# Patient Record
Sex: MALE | Race: WHITE | NOT HISPANIC OR LATINO | Employment: OTHER | URBAN - METROPOLITAN AREA
[De-identification: names, ages, dates, MRNs, and addresses within clinical notes are randomized per-mention and may not be internally consistent; named-entity substitution may affect disease eponyms.]

---

## 2017-01-04 ENCOUNTER — ALLSCRIPTS OFFICE VISIT (OUTPATIENT)
Dept: OTHER | Facility: OTHER | Age: 62
End: 2017-01-04

## 2017-01-10 ENCOUNTER — ALLSCRIPTS OFFICE VISIT (OUTPATIENT)
Dept: OTHER | Facility: OTHER | Age: 62
End: 2017-01-10

## 2017-01-18 ENCOUNTER — GENERIC CONVERSION - ENCOUNTER (OUTPATIENT)
Dept: OTHER | Facility: OTHER | Age: 62
End: 2017-01-18

## 2017-03-09 ENCOUNTER — ANESTHESIA EVENT (OUTPATIENT)
Dept: PERIOP | Facility: HOSPITAL | Age: 62
End: 2017-03-09
Payer: MEDICARE

## 2017-03-10 ENCOUNTER — GENERIC CONVERSION - ENCOUNTER (OUTPATIENT)
Dept: OTHER | Facility: OTHER | Age: 62
End: 2017-03-10

## 2017-03-10 ENCOUNTER — ANESTHESIA (OUTPATIENT)
Dept: PERIOP | Facility: HOSPITAL | Age: 62
End: 2017-03-10
Payer: MEDICARE

## 2017-03-10 ENCOUNTER — HOSPITAL ENCOUNTER (OUTPATIENT)
Facility: HOSPITAL | Age: 62
Setting detail: OUTPATIENT SURGERY
Discharge: HOME | End: 2017-03-10
Attending: INTERNAL MEDICINE | Admitting: INTERNAL MEDICINE
Payer: MEDICARE

## 2017-03-10 VITALS
RESPIRATION RATE: 18 BRPM | BODY MASS INDEX: 28.95 KG/M2 | SYSTOLIC BLOOD PRESSURE: 122 MMHG | DIASTOLIC BLOOD PRESSURE: 67 MMHG | HEIGHT: 68 IN | HEART RATE: 67 BPM | WEIGHT: 191 LBS | OXYGEN SATURATION: 98 % | TEMPERATURE: 98.6 F

## 2017-03-10 RX ORDER — PROPOFOL 10 MG/ML
INJECTION, EMULSION INTRAVENOUS AS NEEDED
Status: DISCONTINUED | OUTPATIENT
Start: 2017-03-10 | End: 2017-03-10 | Stop reason: SURG

## 2017-03-10 RX ORDER — SODIUM CHLORIDE, SODIUM LACTATE, POTASSIUM CHLORIDE, CALCIUM CHLORIDE 600; 310; 30; 20 MG/100ML; MG/100ML; MG/100ML; MG/100ML
125 INJECTION, SOLUTION INTRAVENOUS CONTINUOUS
Status: DISCONTINUED | OUTPATIENT
Start: 2017-03-10 | End: 2017-03-10 | Stop reason: HOSPADM

## 2017-03-10 RX ORDER — ONDANSETRON 2 MG/ML
4 INJECTION INTRAMUSCULAR; INTRAVENOUS ONCE AS NEEDED
Status: DISCONTINUED | OUTPATIENT
Start: 2017-03-10 | End: 2017-03-10 | Stop reason: HOSPADM

## 2017-03-10 RX ADMIN — PROPOFOL 50 MG: 10 INJECTION, EMULSION INTRAVENOUS at 09:16

## 2017-03-10 RX ADMIN — PROPOFOL 50 MG: 10 INJECTION, EMULSION INTRAVENOUS at 09:19

## 2017-03-10 RX ADMIN — PROPOFOL 50 MG: 10 INJECTION, EMULSION INTRAVENOUS at 09:10

## 2017-03-10 RX ADMIN — PROPOFOL 50 MG: 10 INJECTION, EMULSION INTRAVENOUS at 09:08

## 2017-03-10 RX ADMIN — PROPOFOL 50 MG: 10 INJECTION, EMULSION INTRAVENOUS at 09:12

## 2017-03-10 RX ADMIN — SODIUM CHLORIDE, SODIUM LACTATE, POTASSIUM CHLORIDE, AND CALCIUM CHLORIDE 125 ML/HR: .6; .31; .03; .02 INJECTION, SOLUTION INTRAVENOUS at 08:17

## 2017-04-12 ENCOUNTER — ALLSCRIPTS OFFICE VISIT (OUTPATIENT)
Dept: OTHER | Facility: OTHER | Age: 62
End: 2017-04-12

## 2017-04-12 DIAGNOSIS — Z11.59 ENCOUNTER FOR SCREENING FOR OTHER VIRAL DISEASES: ICD-10-CM

## 2017-04-12 DIAGNOSIS — E78.5 HYPERLIPIDEMIA: ICD-10-CM

## 2017-04-12 DIAGNOSIS — Z86.79 PERSONAL HISTORY OF OTHER DISEASES OF THE CIRCULATORY SYSTEM: ICD-10-CM

## 2017-04-12 DIAGNOSIS — Z86.39 PERSONAL HISTORY OF OTHER ENDOCRINE, NUTRITIONAL AND METABOLIC DISEASE: ICD-10-CM

## 2017-04-12 DIAGNOSIS — Z12.5 ENCOUNTER FOR SCREENING FOR MALIGNANT NEOPLASM OF PROSTATE: ICD-10-CM

## 2017-04-12 DIAGNOSIS — D50.9 IRON DEFICIENCY ANEMIA: ICD-10-CM

## 2017-05-25 ENCOUNTER — GENERIC CONVERSION - ENCOUNTER (OUTPATIENT)
Dept: OTHER | Facility: OTHER | Age: 62
End: 2017-05-25

## 2017-08-14 ENCOUNTER — OFFICE VISIT (OUTPATIENT)
Dept: URGENT CARE | Facility: CLINIC | Age: 62
End: 2017-08-14
Payer: MEDICARE

## 2017-08-14 PROCEDURE — G0463 HOSPITAL OUTPT CLINIC VISIT: HCPCS

## 2017-08-14 PROCEDURE — 99213 OFFICE O/P EST LOW 20 MIN: CPT

## 2017-09-28 ENCOUNTER — GENERIC CONVERSION - ENCOUNTER (OUTPATIENT)
Dept: OTHER | Facility: OTHER | Age: 62
End: 2017-09-28

## 2017-12-08 ENCOUNTER — ALLSCRIPTS OFFICE VISIT (OUTPATIENT)
Dept: OTHER | Facility: OTHER | Age: 62
End: 2017-12-08

## 2017-12-09 NOTE — PROGRESS NOTES
Assessment    1  Acute otitis externa of right ear, unspecified type (380 10) (H60 501)   2  Tinnitus of right ear (388 30) (H93 11)   3  Bilateral impacted cerumen (380 4) (H61 23)    Plan  Acute otitis externa of right ear, unspecified type, Bilateral impacted cerumen    · Neomycin-Polymyxin-HC 3 [id number] Otic Suspension; INSTILL 3 DROPS INAFFECTED EAR(S) 3-4 TIMES DAILY    Discussion/Summary  Possible side effects of new medications were reviewed with the patient/guardian today  The treatment plan was reviewed with the patient/guardian  The patient/guardian understands and agrees with the treatment plan      Chief Complaint  Patient is being seen today for an acute visit  Patient states his right ear is ringing/fluid like feeling x5 days  History of Present Illness  HPI: , with known hearing loss AD, presents with his wife for five days right ear ringing and increase difficulty hearing  No recent trauma, change in meds, or URI's  No dizziness  Review of Systems   Constitutional: no fever,-- not feeling poorly,-- no chills-- and-- not feeling tired  ENT: as noted in HPI  Cardiovascular: no chest pain  Respiratory: no shortness of breath  Gastrointestinal: no abdominal pain  Genitourinary: no complaints of dysuria or incontinence, no hesitancy, no nocturia, no genital lesion, no inadequacy of penile erection  Neurological: no headache,-- no confusion-- and-- no dizziness  Active Problems  1  Benign essential hypertension (401 1) (I10)   2  Bruxism (teeth grinding) (306 8) (F45 8)   3  Degenerative disk disease (722 6)   4  Generalized anxiety disorder (300 02) (F41 1)   5  Generalized osteoarthritis (715 00) (M15 9)   6  Hyperlipidemia (272 4) (E78 5)   7  Insect bite, initial encounter (919 4,E906 4) (W57 XXXA)   8  Iron deficiency anemia (280 9) (D50 9)   9  Lumbar radiculopathy (724 4) (M54 16)   10  Macular edema (362 83) (H35 81)   11  Malignant tumor of kidney (189 0) (C64 9)   12  Metastatic cancer to bone (198 5) (C79 51)   13  Need for hepatitis C screening test (V73 89) (Z11 59)   14  Need for influenza vaccination (V04 81) (Z23)   15  Paroxysmal atrial fibrillation (427 31) (I48 0)   16  Special screening examination for neoplasm of prostate (V76 44) (Z12 5)    Past Medical History  Active Problems And Past Medical History Reviewed: The active problems and past medical history were reviewed and updated today  Surgical History  Surgical History Reviewed: The surgical history was reviewed and updated today  Social History     · Denied: Alcohol Use (History)   · Caffeine Use   · Former smoker (V15 82) (T73 683)   · Marital History - Currently   The social history was reviewed and updated today  The social history was reviewed and is unchanged  Family History  Family History Reviewed: The family history was reviewed and updated today  Current Meds   1  B Complex-C Oral Tablet; TAKE 1 TABLET DAILY AS DIRECTED; Therapy: 44EUZ6006 to (Evaluate:49Rha5995) Recorded   2  Calcium + D 600-200 MG-UNIT TABS; TAKE AS DIRECTED PER PACKAGE INSTRUCTIONS; Therapy: 29GKQ1400 to Recorded   3  Sertraline HCl - 50 MG Oral Tablet; TAKE 1 TABLET DAILY; Therapy: 04MDT4286 to (Luzma Olivarez)  Requested for: 83ATC1474; Last Rx:[id number] Ordered    The medication list was reviewed and updated today  Allergies  1  No Known Drug Allergies    Vitals   Recorded: 73FYR4493 09:04AM   Temperature 97 6 F, Tympanic   Heart Rate 73   Respiration 16   Systolic 379, Sitting   Diastolic 68, Sitting   Height 5 ft 8 in   Weight 193 lb 8 0 oz   BMI Calculated 29 42   BSA Calculated 2 02   O2 Saturation 97       Physical Exam   Constitutional  General appearance: No acute distress, well appearing and well nourished  Eyes  Conjunctiva and lids: No swelling, erythema, or discharge  Pupils and irises: Equal, round and reactive to light     Ears, Nose, Mouth, and Throat  External inspection of ears and nose: Normal    Otoscopic examination: Abnormal  -- AU EAC blocked with cerumen  After large cerumen plugs removed, AS wnl, but right TM intact with erythema and whitish d/c in the proximal canal   Nasal mucosa, septum, and turbinates: Normal without edema or erythema  Neurologic  Cranial nerves: Cranial nerves 2-12 intact  Psychiatric  Orientation to person, place and time: Normal    Mood and affect: Normal          Procedure           Procedure: cerumen removal   Indication: tympanic membrane(s) could not be visualized and cerumen impaction in both ears  Procedure Note: The procedure was performed by the Provider--   The procedure required significant time and effort to remove the cerumen  A otoscope and speculum was placed in the ear canal(s) to visualize the ear canal debris  The ear was cleaned by using warm water irrigation-- and-- a wire loop  The procedure was successful  Post-Procedure:  Patient Status: the patient tolerated the procedure well  Complications: there were no complications  Follow-up as needed              Signatures   Electronically signed by : NATALY Reilly ; Dec  8 2017  9:37AM EST                       (Author)

## 2018-01-12 VITALS
HEART RATE: 70 BPM | SYSTOLIC BLOOD PRESSURE: 108 MMHG | DIASTOLIC BLOOD PRESSURE: 68 MMHG | RESPIRATION RATE: 16 BRPM | HEIGHT: 68 IN | TEMPERATURE: 97.6 F | BODY MASS INDEX: 28.95 KG/M2 | WEIGHT: 191 LBS

## 2018-01-13 VITALS
HEIGHT: 68 IN | DIASTOLIC BLOOD PRESSURE: 66 MMHG | HEART RATE: 76 BPM | WEIGHT: 194 LBS | RESPIRATION RATE: 16 BRPM | SYSTOLIC BLOOD PRESSURE: 120 MMHG | TEMPERATURE: 97.7 F | BODY MASS INDEX: 29.4 KG/M2

## 2018-01-15 VITALS
TEMPERATURE: 97.4 F | DIASTOLIC BLOOD PRESSURE: 74 MMHG | SYSTOLIC BLOOD PRESSURE: 130 MMHG | WEIGHT: 193 LBS | RESPIRATION RATE: 16 BRPM | HEART RATE: 68 BPM | BODY MASS INDEX: 29.25 KG/M2 | HEIGHT: 68 IN

## 2018-01-22 VITALS
HEIGHT: 68 IN | WEIGHT: 193.5 LBS | SYSTOLIC BLOOD PRESSURE: 112 MMHG | DIASTOLIC BLOOD PRESSURE: 68 MMHG | OXYGEN SATURATION: 97 % | BODY MASS INDEX: 29.33 KG/M2 | RESPIRATION RATE: 16 BRPM | HEART RATE: 73 BPM | TEMPERATURE: 97.6 F

## 2018-01-30 ENCOUNTER — TELEPHONE (OUTPATIENT)
Dept: INTERNAL MEDICINE CLINIC | Facility: CLINIC | Age: 63
End: 2018-01-30

## 2018-01-30 DIAGNOSIS — H60.90 OTITIS EXTERNA, UNSPECIFIED CHRONICITY, UNSPECIFIED LATERALITY, UNSPECIFIED TYPE: Primary | ICD-10-CM

## 2018-04-26 ENCOUNTER — TELEPHONE (OUTPATIENT)
Dept: INTERNAL MEDICINE CLINIC | Facility: CLINIC | Age: 63
End: 2018-04-26

## 2018-04-26 DIAGNOSIS — J30.9 ALLERGIC RHINITIS, UNSPECIFIED SEASONALITY, UNSPECIFIED TRIGGER: Primary | ICD-10-CM

## 2018-04-26 RX ORDER — MONTELUKAST SODIUM 10 MG/1
10 TABLET ORAL
Qty: 90 TABLET | Refills: 0 | Status: SHIPPED | OUTPATIENT
Start: 2018-04-26 | End: 2018-04-26 | Stop reason: SDUPTHER

## 2018-04-26 RX ORDER — MONTELUKAST SODIUM 10 MG/1
10 TABLET ORAL
Qty: 30 TABLET | Refills: 5 | Status: CANCELLED | OUTPATIENT
Start: 2018-04-26

## 2018-04-26 NOTE — TELEPHONE ENCOUNTER
Would like to know what you would recommend for allergies  He uses generic for Claritin, but is concerned because of only having one kidney  Could you please advise so we can let the pt know?  Thanks

## 2018-04-27 RX ORDER — MONTELUKAST SODIUM 10 MG/1
10 TABLET ORAL
Qty: 30 TABLET | Refills: 5 | Status: SHIPPED | OUTPATIENT
Start: 2018-04-27 | End: 2018-11-14

## 2018-06-25 PROBLEM — Z92.3 PERSONAL HISTORY OF RADIATION THERAPY: Status: ACTIVE | Noted: 2017-01-18

## 2018-06-25 PROBLEM — D50.9 IRON DEFICIENCY ANEMIA: Status: ACTIVE | Noted: 2017-04-12

## 2018-06-25 PROBLEM — C79.31 METASTASIS TO BRAIN (HCC): Status: ACTIVE | Noted: 2017-01-18

## 2018-06-25 PROBLEM — C78.00 METASTASIS TO LUNG (HCC): Status: ACTIVE | Noted: 2017-01-18

## 2018-06-25 PROBLEM — C64.2 MALIGNANT NEOPLASM OF LEFT KIDNEY, EXCEPT RENAL PELVIS (HCC): Status: ACTIVE | Noted: 2017-01-18

## 2018-06-25 PROBLEM — R91.8 PULMONARY NODULES: Status: ACTIVE | Noted: 2017-01-18

## 2018-06-25 PROBLEM — I10 BENIGN ESSENTIAL HYPERTENSION: Status: ACTIVE | Noted: 2017-04-12

## 2018-06-25 PROBLEM — C79.51 BONE METASTASIS (HCC): Status: ACTIVE | Noted: 2017-01-18

## 2018-06-27 ENCOUNTER — OFFICE VISIT (OUTPATIENT)
Dept: INTERNAL MEDICINE CLINIC | Facility: CLINIC | Age: 63
End: 2018-06-27
Payer: MEDICARE

## 2018-06-27 VITALS
HEART RATE: 71 BPM | TEMPERATURE: 98.3 F | SYSTOLIC BLOOD PRESSURE: 120 MMHG | WEIGHT: 189 LBS | OXYGEN SATURATION: 98 % | BODY MASS INDEX: 28.74 KG/M2 | DIASTOLIC BLOOD PRESSURE: 80 MMHG

## 2018-06-27 DIAGNOSIS — I48.0 PAROXYSMAL ATRIAL FIBRILLATION (HCC): ICD-10-CM

## 2018-06-27 DIAGNOSIS — E78.2 MIXED HYPERLIPIDEMIA: ICD-10-CM

## 2018-06-27 DIAGNOSIS — Z11.59 NEED FOR HEPATITIS C SCREENING TEST: ICD-10-CM

## 2018-06-27 DIAGNOSIS — M15.9 GENERALIZED OSTEOARTHRITIS: ICD-10-CM

## 2018-06-27 DIAGNOSIS — H35.81 MACULAR EDEMA: ICD-10-CM

## 2018-06-27 DIAGNOSIS — D50.9 IRON DEFICIENCY ANEMIA, UNSPECIFIED IRON DEFICIENCY ANEMIA TYPE: ICD-10-CM

## 2018-06-27 DIAGNOSIS — I10 BENIGN ESSENTIAL HYPERTENSION: Primary | ICD-10-CM

## 2018-06-27 DIAGNOSIS — Z12.5 SCREENING FOR PROSTATE CANCER: ICD-10-CM

## 2018-06-27 DIAGNOSIS — F41.1 GENERALIZED ANXIETY DISORDER: ICD-10-CM

## 2018-06-27 DIAGNOSIS — C64.2 MALIGNANT NEOPLASM OF LEFT KIDNEY, EXCEPT RENAL PELVIS (HCC): ICD-10-CM

## 2018-06-27 PROCEDURE — 99214 OFFICE O/P EST MOD 30 MIN: CPT | Performed by: INTERNAL MEDICINE

## 2018-06-27 NOTE — PATIENT INSTRUCTIONS

## 2018-06-27 NOTE — PROGRESS NOTES
Assessment/Plan:    No problem-specific Assessment & Plan notes found for this encounter  Diagnoses and all orders for this visit:    Benign essential hypertension  -     CBC and differential; Future  -     Comprehensive metabolic panel; Future  -     Microalbumin / creatinine urine ratio; Future    Paroxysmal atrial fibrillation (HCC)  -     TSH, 3rd generation; Future    Generalized osteoarthritis    Malignant neoplasm of left kidney, except renal pelvis (HCC)    Generalized anxiety disorder    Mixed hyperlipidemia  -     Lipid Panel with Direct LDL reflex; Future    Iron deficiency anemia, unspecified iron deficiency anemia type    Macular edema    Need for hepatitis C screening test  -     Hepatitis C antibody; Future    Screening for prostate cancer  -     PSA Total, Diagnostic; Future      A/P: Doing well and will check labs  Continue current treatment  Keep f/u with Onc and the eye specialist  RTC four months for routine  Subjective:      Patient ID: David Martinez is a 58 y o  male  WM RTC for f/u htn, MARINA, etc  Doing well and no c/o's  Remains active w/o difficulty and no falls  Just seen by [facility] and renal cell cancer with mets is doing well  Continues with the eye issues and seeing the specialist  Due for labs  The following portions of the patient's history were reviewed and updated as appropriate:   He  has a past medical history of Abnormal eye finding; Anemia; Atrial fibrillation ([facility] [address] ); Hyperlipidemia; and Hypertension    He   Patient Active Problem List    Diagnosis Date Noted    Benign essential hypertension 04/12/2017    Iron deficiency anemia 04/12/2017    Bone metastasis ([facility] [address] ) 01/18/2017    Malignant neoplasm of left kidney, except renal pelvis ([facility] [address] ) 01/18/2017    Metastasis to brain ([facility] [address] ) 01/18/2017    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 ([facility] [address] ) 01/18/2017    Personal history of radiation therapy 01/18/2017    Pulmonary nodules 01/18/2017    Macular edema 08/24/2016    Hyperlipidemia 08/18/2015    Paroxysmal atrial fibrillation ([facility] ) 11/21/2013    Degeneration of intervertebral disc 03/07/2013    Generalized osteoarthritis 11/20/2012    Metastatic cancer to bone ([facility] ) 11/20/2012    Generalized anxiety disorder 05/30/2012     He  has a past surgical history that includes External ear surgery; Kidney surgery; Tibia fracture surgery; and Colonoscopy (N/A, 3/10/2017)  His family history includes Cancer in his paternal uncle; No Known Problems in his mother  He  reports that he has quit smoking  He has never used smokeless tobacco  He reports that he does not drink alcohol  His drug history is not on file  Current Outpatient Prescriptions   Medication Sig Dispense Refill    b complex vitamins tablet Take 1 tablet by mouth daily      Bevacizumab (AVASTIN IV) Administer to both eyes q6 weeks       montelukast (SINGULAIR) 10 mg tablet Take 1 tablet (10 mg total) by mouth daily at bedtime 30 tablet 5    sertraline (ZOLOFT) 50 mg tablet Take 50 mg by mouth daily       No current facility-administered medications for this visit  Current Outpatient Prescriptions on File Prior to Visit   Medication Sig    b complex vitamins tablet Take 1 tablet by mouth daily    Bevacizumab (AVASTIN IV) Administer to both eyes q6 weeks     montelukast (SINGULAIR) 10 mg tablet Take 1 tablet (10 mg total) by mouth daily at bedtime    sertraline (ZOLOFT) 50 mg tablet Take 50 mg by mouth daily    [DISCONTINUED] neomycin-colistin-hydrocortisone-thonzonium (CORTISPORIN TC) 0 33-0 3-1-0 05 % otic suspension Administer 3 drops into both ears 4 (four) times a day for 3 days     No current facility-administered medications on file prior to visit  He has No Known Allergies       Review of Systems   Constitutional: Negative for activity change, chills, diaphoresis, fatigue and fever  HENT: Negative  Eyes: Positive for visual disturbance     Respiratory: Negative for cough, chest tightness, shortness of breath and wheezing  Cardiovascular: Negative for chest pain, palpitations and leg swelling  Gastrointestinal: Negative for abdominal pain, constipation, diarrhea, nausea and vomiting  Endocrine: Negative for cold intolerance and heat intolerance  Genitourinary: Negative for difficulty urinating, dysuria and frequency  Musculoskeletal: Negative for arthralgias, gait problem and myalgias  Neurological: Negative for dizziness, seizures, weakness, light-headedness and headaches  Psychiatric/Behavioral: Negative for confusion and dysphoric mood  The patient is not nervous/anxious  Objective:      /80   Pulse 71   Temp 98 3 °F (36 8 °C)   Wt 85 7 kg (189 lb)   SpO2 98%   BMI 28 74 kg/m²          Physical Exam   Constitutional: He is oriented to person, place, and time  He appears well-developed and well-nourished  No distress  HENT:   Head: Normocephalic and atraumatic  Mouth/Throat: Oropharynx is clear and moist    Eyes: Conjunctivae and EOM are normal  Pupils are equal, round, and reactive to light  Neck: Neck supple  No JVD present  Cardiovascular: Normal rate, regular rhythm and normal heart sounds  No murmur heard  Pulmonary/Chest: Effort normal and breath sounds normal  No respiratory distress  He has no wheezes  Abdominal: Soft  Bowel sounds are normal  He exhibits no distension  There is no tenderness  Musculoskeletal: He exhibits no edema  Neurological: He is alert and oriented to person, place, and time  Psychiatric: He has a normal mood and affect  His behavior is normal  Judgment and thought content normal    Nursing note and vitals reviewed

## 2018-08-08 ENCOUNTER — OFFICE VISIT (OUTPATIENT)
Dept: INTERNAL MEDICINE CLINIC | Facility: CLINIC | Age: 63
End: 2018-08-08
Payer: MEDICARE

## 2018-08-08 VITALS
WEIGHT: 188 LBS | RESPIRATION RATE: 16 BRPM | SYSTOLIC BLOOD PRESSURE: 100 MMHG | BODY MASS INDEX: 28.49 KG/M2 | TEMPERATURE: 97.8 F | DIASTOLIC BLOOD PRESSURE: 60 MMHG | HEART RATE: 74 BPM | HEIGHT: 68 IN

## 2018-08-08 DIAGNOSIS — H93.11 TINNITUS OF RIGHT EAR: ICD-10-CM

## 2018-08-08 DIAGNOSIS — H91.91 HEARING LOSS OF RIGHT EAR, UNSPECIFIED HEARING LOSS TYPE: ICD-10-CM

## 2018-08-08 DIAGNOSIS — H61.21 IMPACTED CERUMEN OF RIGHT EAR: Primary | ICD-10-CM

## 2018-08-08 PROCEDURE — 69209 REMOVE IMPACTED EAR WAX UNI: CPT | Performed by: INTERNAL MEDICINE

## 2018-08-08 PROCEDURE — 99213 OFFICE O/P EST LOW 20 MIN: CPT | Performed by: INTERNAL MEDICINE

## 2018-08-08 NOTE — PROGRESS NOTES
Assessment/Plan:    No problem-specific Assessment & Plan notes found for this encounter  Diagnoses and all orders for this visit:    Impacted cerumen of right ear  -     Ambulatory Referral to Otolaryngology; Future  -     carbamide peroxide (DEBROX) 6 5 % otic solution; Administer 5 drops into both ears 2 (two) times a day  -     Ear cerumen removal    Tinnitus of right ear  -     Ambulatory Referral to Otolaryngology; Future  -     carbamide peroxide (DEBROX) 6 5 % otic solution; Administer 5 drops into both ears 2 (two) times a day  -     Ear cerumen removal    Hearing loss of right ear, unspecified hearing loss type  -     Ambulatory Referral to Otolaryngology; Future  -     carbamide peroxide (DEBROX) 6 5 % otic solution; Administer 5 drops into both ears 2 (two) times a day  -     Ear cerumen removal    Other orders  -     Cancel: Ear cerumen removal      A/P: Will give debrox for both ears and refer to ENT  ??tinnitus or can he be having residual affect of prior sx and XRT, ie scar tissue  No focal findings on PE  May need a f/u imaging of the brain  RTC as scheduled  Subjective:      Patient ID: Maxine Merino is a 58 y o  male  WM, with chronic loss of hearing on the right after sx and XRT for brain mets, presents due to several weeks of ringing in the ears  No recent trauma  Uses hearing plugs when he cuts the grass  No balance issues  NO recent URI  The following portions of the patient's history were reviewed and updated as appropriate:   He  has a past medical history of Abnormal eye finding; Anemia; Atrial fibrillation ([address] ); Hyperlipidemia; and Hypertension    He   Patient Active Problem List    Diagnosis Date Noted    Benign essential hypertension 04/12/2017    Iron deficiency anemia 04/12/2017    Bone metastasis ([address] ) 01/18/2017    Malignant neoplasm of left kidney, except renal pelvis ([address] ) 01/18/2017    Metastasis to brain ([address] ) 01/18/2017    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 ([address] ) 01/18/2017    Personal history of radiation therapy 01/18/2017    Pulmonary nodules 01/18/2017    Macular edema 08/24/2016    Hyperlipidemia 08/18/2015    Paroxysmal atrial fibrillation ([facility] ) 11/21/2013    Degeneration of intervertebral disc 03/07/2013    Generalized osteoarthritis 11/20/2012    [facility] ) 11/20/2012    Generalized anxiety disorder 05/30/2012     He  has a past surgical history that includes External ear surgery; Kidney surgery; Tibia fracture surgery; and Colonoscopy (N/A, 3/10/2017)  His family history includes Cancer in his paternal uncle; No Known Problems in his mother  He  reports that he has quit smoking  He has never used smokeless tobacco  He reports that he does not drink alcohol  His drug history is not on file  Current Outpatient Prescriptions   Medication Sig Dispense Refill    Bevacizumab (AVASTIN IV) Administer to both eyes q6 weeks       montelukast (SINGULAIR) 10 mg tablet Take 1 tablet (10 mg total) by mouth daily at bedtime 30 tablet 5    sertraline (ZOLOFT) 50 mg tablet Take 50 mg by mouth daily      carbamide peroxide (DEBROX) 6 5 % otic solution Administer 5 drops into both ears 2 (two) times a day 15 mL 1     No current facility-administered medications for this visit  Current Outpatient Prescriptions on File Prior to Visit   Medication Sig    Bevacizumab (AVASTIN IV) Administer to both eyes q6 weeks     montelukast (SINGULAIR) 10 mg tablet Take 1 tablet (10 mg total) by mouth daily at bedtime    sertraline (ZOLOFT) 50 mg tablet Take 50 mg by mouth daily    [DISCONTINUED] b complex vitamins tablet Take 1 tablet by mouth daily     No current facility-administered medications on file prior to visit  He has No Known Allergies       Review of Systems   Constitutional: Negative for activity change, chills, diaphoresis, fatigue and fever  HENT: Positive for hearing loss and tinnitus   Negative for congestion, ear discharge, ear pain, facial swelling, sore throat, trouble swallowing and voice change  Respiratory: Negative for cough, chest tightness, shortness of breath and wheezing  Cardiovascular: Negative for chest pain, palpitations and leg swelling  Gastrointestinal: Negative for abdominal pain, constipation, diarrhea, nausea and vomiting  Genitourinary: Negative for difficulty urinating, dysuria and frequency  Musculoskeletal: Negative for arthralgias, gait problem and myalgias  Neurological: Negative for dizziness, seizures, facial asymmetry, weakness, light-headedness and headaches  Psychiatric/Behavioral: Negative for confusion  The patient is not nervous/anxious  Objective:      /60   Pulse 74   Temp 97 8 °F (36 6 °C) (Tympanic)   Resp 16   Ht 5' 8" (1 727 m)   Wt 85 3 kg (188 lb)   BMI 28 59 kg/m²          Physical Exam   Constitutional: He is oriented to person, place, and time  He appears well-developed and well-nourished  No distress  HENT:   Head: Normocephalic and atraumatic  Nose: Nose normal    Mouth/Throat: Oropharynx is clear and moist  No oropharyngeal exudate  AU EAC bilat with some cerumen, right greater than the left  AS TM visualized and wnl  AD TM only partially viewed due to cerumen  Eyes: Conjunctivae and EOM are normal  Pupils are equal, round, and reactive to light  Neck: Neck supple  Lymphadenopathy:     He has no cervical adenopathy  Neurological: He is alert and oriented to person, place, and time  No cranial nerve deficit  Coordination normal    Psychiatric: He has a normal mood and affect  His behavior is normal  Judgment and thought content normal    Nursing note and vitals reviewed        Ear cerumen removal  Date/Time: 8/8/2018 3:14 PM  Performed by: Marysol Valles by: Hakeem Kaur     Patient location:  Clinic  Consent:     Consent obtained:  Verbal    Consent given by:  Patient    Risks discussed:  Bleeding, dizziness, infection, incomplete removal, pain and TM perforation    Alternatives discussed:  Referral and no treatment  Universal protocol:     Patient identity confirmed:  Verbally with patient  Procedure details:     Location:  R ear    Procedure type: irrigation      Approach:  External  Post-procedure details:     Complication:  None    Hearing quality:  Diminished    Patient tolerance of procedure: Tolerated well, no immediate complications  Comments:      AD EAC now patent with moderate removal, but some residual cerumen on the floor  TM intact with some scarring  No change in hearing or ringing

## 2018-09-26 ENCOUNTER — APPOINTMENT (OUTPATIENT)
Dept: LAB | Facility: HOSPITAL | Age: 63
End: 2018-09-26
Attending: INTERNAL MEDICINE
Payer: MEDICARE

## 2018-09-26 ENCOUNTER — CLINICAL SUPPORT (OUTPATIENT)
Dept: INTERNAL MEDICINE CLINIC | Facility: CLINIC | Age: 63
End: 2018-09-26
Payer: MEDICARE

## 2018-09-26 ENCOUNTER — TRANSCRIBE ORDERS (OUTPATIENT)
Dept: ADMINISTRATIVE | Facility: HOSPITAL | Age: 63
End: 2018-09-26

## 2018-09-26 DIAGNOSIS — R91.8 PULMONARY NODULES: ICD-10-CM

## 2018-09-26 DIAGNOSIS — C79.49 SECONDARY MALIGNANT NEOPLASM OF BRAIN AND SPINAL CORD (HCC): ICD-10-CM

## 2018-09-26 DIAGNOSIS — Z92.3 PERSONAL HISTORY OF RADIATION THERAPY: ICD-10-CM

## 2018-09-26 DIAGNOSIS — I48.0 PAROXYSMAL ATRIAL FIBRILLATION (HCC): ICD-10-CM

## 2018-09-26 DIAGNOSIS — C79.51 BONE METASTASIS (HCC): ICD-10-CM

## 2018-09-26 DIAGNOSIS — Z12.5 SCREENING FOR PROSTATE CANCER: ICD-10-CM

## 2018-09-26 DIAGNOSIS — C64.2 MALIGNANT NEOPLASM OF LEFT KIDNEY, EXCEPT RENAL PELVIS (HCC): Primary | ICD-10-CM

## 2018-09-26 DIAGNOSIS — C79.31 SECONDARY MALIGNANT NEOPLASM OF BRAIN AND SPINAL CORD (HCC): ICD-10-CM

## 2018-09-26 DIAGNOSIS — C64.2 MALIGNANT NEOPLASM OF LEFT KIDNEY, EXCEPT RENAL PELVIS (HCC): ICD-10-CM

## 2018-09-26 DIAGNOSIS — Z11.59 NEED FOR HEPATITIS C SCREENING TEST: ICD-10-CM

## 2018-09-26 DIAGNOSIS — I10 BENIGN ESSENTIAL HYPERTENSION: ICD-10-CM

## 2018-09-26 DIAGNOSIS — E78.2 MIXED HYPERLIPIDEMIA: ICD-10-CM

## 2018-09-26 DIAGNOSIS — C78.00 MALIGNANT NEOPLASM METASTATIC TO LUNG, UNSPECIFIED LATERALITY (HCC): ICD-10-CM

## 2018-09-26 DIAGNOSIS — Z23 NEED FOR VACCINATION: Primary | ICD-10-CM

## 2018-09-26 LAB
ALBUMIN SERPL BCP-MCNC: 4.6 G/DL (ref 3.5–5.7)
ALP SERPL-CCNC: 89 U/L (ref 55–165)
ALT SERPL W P-5'-P-CCNC: 12 U/L (ref 7–52)
ANION GAP SERPL CALCULATED.3IONS-SCNC: 6 MMOL/L (ref 4–13)
AST SERPL W P-5'-P-CCNC: 16 U/L (ref 13–39)
BASOPHILS # BLD AUTO: 0 THOUSANDS/ΜL (ref 0–0.1)
BASOPHILS NFR BLD AUTO: 0 % (ref 0–2)
BILIRUB SERPL-MCNC: 0.8 MG/DL (ref 0.2–1)
BUN SERPL-MCNC: 20 MG/DL (ref 7–25)
CALCIUM ALBUM COR SERPL-MCNC: 9.7 MG/DL (ref 8.3–10.1)
CALCIUM SERPL-MCNC: 10.2 MG/DL (ref 8.6–10.5)
CHLORIDE SERPL-SCNC: 100 MMOL/L (ref 98–107)
CHOLEST SERPL-MCNC: 170 MG/DL (ref 0–200)
CO2 SERPL-SCNC: 32 MMOL/L (ref 21–31)
CREAT SERPL-MCNC: 1.15 MG/DL (ref 0.7–1.3)
CREAT UR-MCNC: 88.3 MG/DL
EOSINOPHIL # BLD AUTO: 0.1 THOUSAND/ΜL (ref 0–0.61)
EOSINOPHIL NFR BLD AUTO: 1 % (ref 0–5)
ERYTHROCYTE [DISTWIDTH] IN BLOOD BY AUTOMATED COUNT: 13.4 % (ref 11.5–14.5)
GFR SERPL CREATININE-BSD FRML MDRD: 68 ML/MIN/1.73SQ M
GLUCOSE P FAST SERPL-MCNC: 90 MG/DL (ref 65–99)
HCT VFR BLD AUTO: 39.9 % (ref 36.5–49.3)
HDLC SERPL-MCNC: 45 MG/DL (ref 40–60)
HGB BLD-MCNC: 13.3 G/DL (ref 14–18)
LDLC SERPL CALC-MCNC: 103 MG/DL (ref 75–193)
LYMPHOCYTES # BLD AUTO: 1 THOUSANDS/ΜL (ref 0.6–4.47)
LYMPHOCYTES NFR BLD AUTO: 19 % (ref 21–51)
MCH RBC QN AUTO: 30 PG (ref 26–34)
MCHC RBC AUTO-ENTMCNC: 33.3 G/DL (ref 31–37)
MCV RBC AUTO: 90 FL (ref 81–99)
MICROALBUMIN UR-MCNC: <5 MG/L (ref 0–20)
MICROALBUMIN/CREAT 24H UR: <6 MG/G CREATININE (ref 0–30)
MONOCYTES # BLD AUTO: 0.6 THOUSAND/ΜL (ref 0.17–1.22)
MONOCYTES NFR BLD AUTO: 11 % (ref 2–12)
NEUTROPHILS # BLD AUTO: 3.7 THOUSANDS/ΜL (ref 1.4–6.5)
NEUTS SEG NFR BLD AUTO: 69 % (ref 42–75)
NRBC BLD AUTO-RTO: 0 /100 WBCS
PLATELET # BLD AUTO: 189 THOUSANDS/UL (ref 149–390)
PMV BLD AUTO: 8 FL (ref 8.6–11.7)
POTASSIUM SERPL-SCNC: 4.8 MMOL/L (ref 3.5–5.5)
PROT SERPL-MCNC: 7.1 G/DL (ref 6.4–8.9)
PSA SERPL-MCNC: 0.9 NG/ML (ref 0–4)
RBC # BLD AUTO: 4.42 MILLION/UL (ref 4.3–5.9)
SODIUM SERPL-SCNC: 138 MMOL/L (ref 134–143)
TRIGL SERPL-MCNC: 111 MG/DL (ref 44–166)
TSH SERPL DL<=0.05 MIU/L-ACNC: 2.71 UIU/ML (ref 0.45–5.33)
WBC # BLD AUTO: 5.4 THOUSAND/UL (ref 4.8–10.8)

## 2018-09-26 PROCEDURE — 80061 LIPID PANEL: CPT

## 2018-09-26 PROCEDURE — G0103 PSA SCREENING: HCPCS

## 2018-09-26 PROCEDURE — 82570 ASSAY OF URINE CREATININE: CPT

## 2018-09-26 PROCEDURE — G0008 ADMIN INFLUENZA VIRUS VAC: HCPCS

## 2018-09-26 PROCEDURE — 85025 COMPLETE CBC W/AUTO DIFF WBC: CPT

## 2018-09-26 PROCEDURE — 80053 COMPREHEN METABOLIC PANEL: CPT

## 2018-09-26 PROCEDURE — 90682 RIV4 VACC RECOMBINANT DNA IM: CPT

## 2018-09-26 PROCEDURE — 82043 UR ALBUMIN QUANTITATIVE: CPT

## 2018-09-26 PROCEDURE — 36415 COLL VENOUS BLD VENIPUNCTURE: CPT

## 2018-09-26 PROCEDURE — 86803 HEPATITIS C AB TEST: CPT

## 2018-09-26 PROCEDURE — 84443 ASSAY THYROID STIM HORMONE: CPT

## 2018-09-27 LAB — HCV AB SER QL: NORMAL

## 2018-11-14 ENCOUNTER — OFFICE VISIT (OUTPATIENT)
Dept: INTERNAL MEDICINE CLINIC | Facility: CLINIC | Age: 63
End: 2018-11-14
Payer: MEDICARE

## 2018-11-14 VITALS
TEMPERATURE: 97.9 F | DIASTOLIC BLOOD PRESSURE: 68 MMHG | HEART RATE: 70 BPM | HEIGHT: 68 IN | BODY MASS INDEX: 28.34 KG/M2 | WEIGHT: 187 LBS | SYSTOLIC BLOOD PRESSURE: 120 MMHG | RESPIRATION RATE: 14 BRPM

## 2018-11-14 DIAGNOSIS — E78.2 MIXED HYPERLIPIDEMIA: ICD-10-CM

## 2018-11-14 DIAGNOSIS — Z00.00 MEDICARE ANNUAL WELLNESS VISIT, INITIAL: ICD-10-CM

## 2018-11-14 DIAGNOSIS — F41.1 GENERALIZED ANXIETY DISORDER: ICD-10-CM

## 2018-11-14 DIAGNOSIS — I48.0 PAROXYSMAL ATRIAL FIBRILLATION (HCC): ICD-10-CM

## 2018-11-14 DIAGNOSIS — F41.9 ANXIETY: Primary | ICD-10-CM

## 2018-11-14 DIAGNOSIS — D50.9 IRON DEFICIENCY ANEMIA, UNSPECIFIED IRON DEFICIENCY ANEMIA TYPE: ICD-10-CM

## 2018-11-14 DIAGNOSIS — C64.2 MALIGNANT NEOPLASM OF LEFT KIDNEY, EXCEPT RENAL PELVIS (HCC): ICD-10-CM

## 2018-11-14 DIAGNOSIS — I10 BENIGN ESSENTIAL HYPERTENSION: ICD-10-CM

## 2018-11-14 PROCEDURE — G0439 PPPS, SUBSEQ VISIT: HCPCS | Performed by: INTERNAL MEDICINE

## 2018-11-14 PROCEDURE — 99214 OFFICE O/P EST MOD 30 MIN: CPT | Performed by: INTERNAL MEDICINE

## 2018-11-14 NOTE — PROGRESS NOTES
Assessment and Plan:    Problem List Items Addressed This Visit        Cardiovascular and Mediastinum    Benign essential hypertension    Paroxysmal atrial fibrillation (HCC)       Genitourinary    Malignant neoplasm of left kidney, except renal pelvis (HCC)       Other    Generalized anxiety disorder    Relevant Medications    sertraline (ZOLOFT) 50 mg tablet    Hyperlipidemia    Iron deficiency anemia      Other Visit Diagnoses     Anxiety    -  Primary    Relevant Medications    sertraline (ZOLOFT) 50 mg tablet    Medicare annual wellness visit, initial            Health Maintenance Due   Topic Date Due    Pneumococcal PPSV23 Highest Risk Adult (1 of 3 - PCV13) 10[birth year]/1974         HPI:  Bonifacio Hussein is a 61 y o  male here for his Initial Wellness Visit      Patient Active Problem List   Diagnosis    Benign essential hypertension    Bone metastasis (HCC)    Degeneration of intervertebral disc    Generalized anxiety disorder    Generalized osteoarthritis    Hyperlipidemia    Iron deficiency anemia    Macular edema    Malignant neoplasm of left kidney, except renal pelvis (HCC)    Metastasis to brain (HCC)    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 (HCC)    Metastatic cancer to bone (HCC)    Paroxysmal atrial fibrillation (HCC)    Personal history of radiation therapy    Pulmonary nodules     Past Medical History:   Diagnosis Date    Abnormal eye finding     last assessed 08/18/2015    Anemia     Atrial fibrillation (HCC)     Generalized anxiety disorder     Hyperlipidemia     Hypertension      Past Surgical History:   Procedure Laterality Date    COLONOSCOPY N/A 3/10/2017    Procedure: COLONOSCOPY;  Surgeon: Deon Du MD;  Location: [facility];  [facility]      KIDNEY SURGERY      TIBIA FRACTURE SURGERY       Family History   Problem Relation Age of Onset    No Known Problems Mother     Cancer Paternal Uncle      History   Smoking Status    Former Smoker   Smokeless Tobacco    Never Used     History   Alcohol Use No      History   Drug use: Unknown       Current Outpatient Prescriptions   Medication Sig Dispense Refill    Bevacizumab (AVASTIN IV) Administer to both eyes q6 weeks       sertraline (ZOLOFT) 50 mg tablet Take 1 tablet (50 mg total) by mouth daily 90 tablet 3     No current facility-administered medications for this visit  No Known Allergies  Immunization History   Administered Date(s) Administered    Influenza 09/29/2014, 10/15/2015, 10/03/2016, 10/02/2017    Influenza Quadrivalent Preservative Free 3 years and older IM 10/15/2015, 10/03/2016    Influenza Quadrivalent, 6-35 Months IM 10/02/2017    Influenza TIV (IM) 10/01/2013, 09/29/2014    Influenza, recombinant, quadrivalent,injectable, preservative free 09/26/2018    Td (adult), adsorbed 01/01/2004    Tdap 08/18/2015       Patient Care Team:  Moraima Noble DO as PCP - General  MD Moraima Montez DO    Medicare Screening Tests and Risk Assessments:  Liz Escalera is here for his Initial Wellness visit  Last Medicare Wellness visit information reviewed, patient interviewed and updates made to the record today  Health Risk Assessment:  Patient rates overall health as good  Patient feels that their physical health rating is Slightly better  Eyesight was rated as Same  Hearing was rated as Same  Patient feels that their emotional and mental health rating is Same  Pain experienced by patient in the last 7 days has been None  Patient states that he has experienced no weight loss or gain in last 6 months  Emotional/Mental Health:  Patient has been feeling nervous/anxious  PHQ-9 Depression Screening:    Frequency of the following problems over the past two weeks:      1  Little interest or pleasure in doing things: 0 - not at all      2  Feeling down, depressed, or hopeless: 0 - not at all  PHQ-2 Score: 0          Broken Bones/Falls:     Fall Risk Assessment:    In the past year, patient has experienced: No history of falling in past year          Bladder/Bowel:  Patient has not leaked urine accidently in the last six months  Patient reports no loss of bowel control  Immunizations:  Patient has had a flu vaccination within the last year  Patient has received a pneumonia shot  Patient has not received a shingles shot  Patient has received tetanus/diphtheria shot  Home Safety:  Patient does not have trouble with stairs inside or outside of their home  Patient currently reports that there are no safety hazards present in home, working smoke alarms, working carbon monoxide detectors  Preventative Screenings:   prostate cancer screen performed, colon cancer screen completed, cholesterol screen completed, glaucoma eye exam completed,     Nutrition:  Current diet: Regular with servings of the following:    Medications:  Patient is not currently taking any over-the-counter supplements  Patient is able to manage medications  Lifestyle Choices:  Patient reports no tobacco use  Patient has smoked or used tobacco in the past   Patient has stopped his tobacco use  Patient reports no alcohol use  Patient drives a vehicle  Patient wears seat belt  Activities of Daily Living:  Can get out of bed by his or her self, able to dress self, able to make own meals, able to do own shopping, able to bathe self, can do own laundry/housekeeping, can manage own money, pay bills and track expenses    Previous Hospitalizations:  No hospitalization or ED visit in past 12 months        Advanced Directives:  Patient has not decided on power of   Patient has completed advanced directive          Preventative Screening/Counseling:      Cardiovascular:      General: Screening Current      Counseling: Healthy Diet, Healthy Weight, Improve Cholesterol, Improve Blood Pressure and Improve Exercise Tolerance          Diabetes:      General: Screening Current      Counseling: Healthy Diet, Healthy Weight and Improve Physical Activity          Colorectal Cancer:      General: Screening Current      Counseling: high fiber diet          Prostate Cancer:      General: Screening Current          Osteoporosis:      General: Screening Not Indicated          AAA:      General: Screening Not Indicated          Glaucoma:      General: Screening Current          HIV:      General: Screening Not Indicated          Hepatitis C:      General: Screening Current        Advanced Directives:   Patient has living will for healthcare, has durable POA for healthcare, patient has an advanced directive  Information on ACP and/or AD provided  No 5 wishes given  End of life assessment reviewed with patient  Provider agrees with end of life decisions   Additional Comments: Some confusion  Pt reports POA is his wife, but he is not certain that there is written documentation to that effect  Will check it out and let us know  Immunizations:      Influenza: Influenza UTD This Year      TDAP: Tdap Vaccine UTD  Additional Comments: Not 65 yet, but qualifies for pneumonia vaccine, but defers for now  Other Preventative Counseling (Non-Medicare): Fall Prevention, Increase physical activity, Nutrition Counseling and Car/seat belt/driving safety reviewed      A/P: Doing well and no falls  Denies depression and feels safe at home  Diverse diet  No problems operating a MV and wears a seat belt  Has a living will and POA  No DME or referrals needed today  RTC one year for medicare wellness

## 2018-11-14 NOTE — PROGRESS NOTES
Assessment/Plan:    No problem-specific Assessment & Plan notes found for this encounter  Diagnoses and all orders for this visit:    Anxiety  -     sertraline (ZOLOFT) 50 mg tablet; Take 1 tablet (50 mg total) by mouth daily    Benign essential hypertension    Paroxysmal atrial fibrillation (HCC)    Malignant neoplasm of left kidney, except renal pelvis (HCC)    Generalized anxiety disorder    Mixed hyperlipidemia    Iron deficiency anemia, unspecified iron deficiency anemia type    Medicare annual wellness visit, initial      A/P: Doing great  Labs good  Will continue current treatment  Keep f/u with onc and nephro  Recommend OTC probiotics and anti gas products for the bloating  RTC four months for routine  Subjective:      Patient ID: Maxine Wolf is a 61 y o  male  WM RTC for f/u htn, hypelipidemia, etc  Doing well and only new c/o is some bloating and increase gas after eating a lot broccoli, brussels sprouts, etc  Remains very active and no falls  Renal cancer and all it's mets are in remission  EMBER is good  Recent labs acceptable  Had his flu shot  The following portions of the patient's history were reviewed and updated as appropriate:   He  has a past medical history of Abnormal eye finding; Anemia; Atrial fibrillation ([address] ); Generalized anxiety disorder; Hyperlipidemia; and Hypertension    He   Patient Active Problem List    Diagnosis Date Noted    Benign essential hypertension 04/12/2017    Iron deficiency anemia 04/12/2017    Bone metastasis ([address] ) 01/18/2017    Malignant neoplasm of left kidney, except renal pelvis ([address] ) 01/18/2017    Metastasis to brain ([address] ) 01/18/2017    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 ([facility] [address] ) 01/18/2017    Personal history of radiation therapy 01/18/2017    Pulmonary nodules 01/18/2017    Macular edema 08/24/2016    Hyperlipidemia 08/18/2015    Paroxysmal atrial fibrillation ([address] ) 11/21/2013    Degeneration of intervertebral disc 03/07/2013    Generalized osteoarthritis 11/20/2012    Metastatic cancer to bone ([facility] [address] ) 11/20/2012    Generalized anxiety disorder 05/30/2012     He  has a past surgical history that includes External ear surgery; Kidney surgery; Tibia fracture surgery; and Colonoscopy (N/A, 3/10/2017)  His family history includes Cancer in his paternal uncle; No Known Problems in his mother  He  reports that he has quit smoking  He has never used smokeless tobacco  He reports that he does not drink alcohol  His drug history is not on file  Current Outpatient Prescriptions   Medication Sig Dispense Refill    Bevacizumab (AVASTIN IV) Administer to both eyes q6 weeks       sertraline (ZOLOFT) 50 mg tablet Take 1 tablet (50 mg total) by mouth daily 90 tablet 3     No current facility-administered medications for this visit  Current Outpatient Prescriptions on File Prior to Visit   Medication Sig    Bevacizumab (AVASTIN IV) Administer to both eyes q6 weeks     [DISCONTINUED] carbamide peroxide (DEBROX) 6 5 % otic solution Administer 5 drops into both ears 2 (two) times a day (Patient not taking: Reported on 11/14/2018 )    [DISCONTINUED] montelukast (SINGULAIR) 10 mg tablet Take 1 tablet (10 mg total) by mouth daily at bedtime (Patient not taking: Reported on 11/14/2018 )    [DISCONTINUED] sertraline (ZOLOFT) 50 mg tablet Take 50 mg by mouth daily     No current facility-administered medications on file prior to visit  He has No Known Allergies       Review of Systems   Constitutional: Negative for activity change, chills, diaphoresis, fatigue and fever  HENT: Negative  Eyes: Negative for visual disturbance  Respiratory: Negative for cough, chest tightness, shortness of breath and wheezing  Cardiovascular: Negative for chest pain, palpitations and leg swelling  Gastrointestinal: Negative for abdominal pain, constipation, diarrhea, nausea and vomiting  Endocrine: Negative for cold intolerance and heat intolerance     Genitourinary: Negative for difficulty urinating, dysuria and frequency  Musculoskeletal: Negative for arthralgias, gait problem and myalgias  Neurological: Negative for dizziness, seizures, syncope, weakness, light-headedness and headaches  Psychiatric/Behavioral: Negative for confusion, dysphoric mood, self-injury, sleep disturbance and suicidal ideas  The patient is not nervous/anxious  Objective:      /68   Pulse 70   Temp 97 9 °F (36 6 °C) (Tympanic)   Resp 14   Ht 5' 8" (1 727 m)   Wt 84 8 kg (187 lb)   BMI 28 43 kg/m²          Physical Exam   Constitutional: He is oriented to person, place, and time  He appears well-developed and well-nourished  No distress  HENT:   Head: Normocephalic and atraumatic  Mouth/Throat: Oropharynx is clear and moist  No oropharyngeal exudate  Eyes: Pupils are equal, round, and reactive to light  Conjunctivae and EOM are normal    Neck: Neck supple  No JVD present  Cardiovascular: Normal rate, regular rhythm and normal heart sounds  No murmur heard  Pulmonary/Chest: Effort normal and breath sounds normal  No respiratory distress  He has no wheezes  Abdominal: Soft  Bowel sounds are normal  He exhibits no distension  There is no tenderness  Musculoskeletal: He exhibits no edema  Neurological: He is alert and oriented to person, place, and time  Psychiatric: He has a normal mood and affect  His behavior is normal  Judgment and thought content normal    Nursing note and vitals reviewed

## 2018-11-14 NOTE — PATIENT INSTRUCTIONS
Chronic Hypertension   AMBULATORY CARE:   Hypertension  is high blood pressure (BP)  Your BP is the force of your blood moving against the walls of your arteries  Normal BP is less than 120/80  Prehypertension is between 120/80 and 139/89  Hypertension is 140/90 or higher  Hypertension causes your BP to get so high that your heart has to work much harder than normal  This can damage your heart  Chronic hypertension is a long-term condition that you can control with a healthy lifestyle or medicines  A controlled blood pressure helps protect your organs, such as your heart, lungs, brain, and kidneys  Common symptoms include the following:   · Headache     · Blurred vision    · Chest pain     · Dizziness or weakness     · Trouble breathing     · Nosebleeds  Call 911 for any of the following:   · You have discomfort in your chest that feels like squeezing, pressure, fullness, or pain  · You become confused or have difficulty speaking  · You suddenly feel lightheaded or have trouble breathing  · You have pain or discomfort in your back, neck, jaw, stomach, or arm  Seek care immediately if:   · You have a severe headache or vision loss  · You have weakness in an arm or leg  Contact your healthcare provider if:   · You feel faint, dizzy, confused, or drowsy  · You have been taking your BP medicine and your BP is still higher than your healthcare provider says it should be  · You have questions or concerns about your condition or care  Treatment for chronic hypertension  may include medicine to lower your BP and lower your cholesterol level  A low cholesterol level helps prevent heart disease and makes it easier to control your blood pressure  Heart disease can make your blood pressure harder to control  You may also need to make lifestyle changes  Take your medicine exactly as directed    Manage chronic hypertension:  Talk with your healthcare provider about these and other ways to manage hypertension:  · Take your BP at home  Sit and rest for 5 minutes before you take your BP  Extend your arm and support it on a flat surface  Your arm should be at the same level as your heart  Follow the directions that came with your BP monitor  If possible, take at least 2 BP readings each time  Take your BP at least twice a day at the same times each day, such as morning and evening  Keep a record of your BP readings and bring it to your follow-up visits  Ask your healthcare provider what your blood pressure should be  · Limit sodium (salt) as directed  Too much sodium can affect your fluid balance  Check labels to find low-sodium or no-salt-added foods  Some low-sodium foods use potassium salts for flavor  Too much potassium can also cause health problems  Your healthcare provider will tell you how much sodium and potassium are safe for you to have in a day  He or she may recommend that you limit sodium to 2,300 mg a day  · Follow the meal plan recommended by your healthcare provider  A dietitian or your provider can give you more information on low-sodium plans or the DASH (Dietary Approaches to Stop Hypertension) eating plan  The DASH plan is low in sodium, unhealthy fats, and total fat  It is high in potassium, calcium, and fiber  · Exercise to maintain a healthy weight  Exercise at least 30 minutes per day, on most days of the week  This will help decrease your blood pressure  Ask about the best exercise plan for you  · Decrease stress  This may help lower your BP  Learn ways to relax, such as deep breathing or listening to music  · Limit alcohol  Women should limit alcohol to 1 drink a day  Men should limit alcohol to 2 drinks a day  A drink of alcohol is 12 ounces of beer, 5 ounces of wine, or 1½ ounces of liquor  · Do not smoke  Nicotine and other chemicals in cigarettes and cigars can increase your BP and also cause lung damage   Ask your healthcare provider for information if you currently smoke and need help to quit  E-cigarettes or smokeless tobacco still contain nicotine  Talk to your healthcare provider before you use these products  Follow up with your healthcare provider as directed: You will need to return to have your BP checked and to have other lab tests done  Write down your questions so you remember to ask them during your visits  © 2017 [address] [employer] Information is for End User's use only and may not be sold, redistributed or otherwise used for commercial purposes  All illustrations and images included in CareNotes® are the copyrighted property of [employer]  or Joao Cha  The above information is an  only  It is not intended as medical advice for individual conditions or treatments  Talk to your doctor, nurse or pharmacist before following any medical regimen to see if it is safe and effective for you  Obesity   AMBULATORY CARE:   Obesity  is when your body mass index (BMI) is greater than 30  Your healthcare provider will use your height and weight to measure your BMI  The risks of obesity include  many health problems, such as injuries or physical disability  You may need tests to check for the following:  · Diabetes     · High blood pressure or high cholesterol     · Heart disease     · Gallbladder or liver disease     · Cancer of the colon, breast, prostate, liver, or kidney     · Sleep apnea     · Arthritis or gout  Seek care immediately if:   · You have a severe headache, confusion, or difficulty speaking  · You have weakness on one side of your body  · You have chest pain, sweating, or shortness of breath  Contact your healthcare provider if:   · You have symptoms of gallbladder or liver disease, such as pain in your upper abdomen  · You have knee or hip pain and discomfort while walking       · You have symptoms of diabetes, such as intense hunger and thirst, and frequent urination  · You have symptoms of sleep apnea, such as snoring or daytime sleepiness  · You have questions or concerns about your condition or care  Treatment for obesity  focuses on helping you lose weight to improve your health  Even a small decrease in BMI can reduce the risk for many health problems  Your healthcare provider will help you set a weight-loss goal   · Lifestyle changes  are the first step in treating obesity  These include making healthy food choices and getting regular physical activity  Your healthcare provider may suggest a weight-loss program that involves coaching, education, and therapy  · Medicine  may help you lose weight when it is used with a healthy diet and physical activity  · Surgery  can help you lose weight if you are very obese and have other health problems  There are several types of weight-loss surgery  Ask your healthcare provider for more information  Be successful losing weight:   · Set small, realistic goals  An example of a small goal is to walk for 20 minutes 5 days a week  Anther goal is to lose 5% of your body weight  · Tell friends, family members, and coworkers about your goals  and ask for their support  Ask a friend to lose weight with you, or join a weight-loss support group  · Identify foods or triggers that may cause you to overeat , and find ways to avoid them  Remove tempting high-calorie foods from your home and workplace  Place a bowl of fresh fruit on your kitchen counter  If stress causes you to eat, then find other ways to cope with stress  · Keep a diary to track what you eat and drink  Also write down how many minutes of physical activity you do each day  Weigh yourself once a week and record it in your diary  Eating changes: You will need to eat 500 to 1,000 fewer calories each day than you currently eat to lose 1 to 2 pounds a week  The following changes will help you cut calories:  · Eat smaller portions    Use small plates, no larger than 9 inches in diameter  Fill your plate half full of fruits and vegetables  Measure your food using measuring cups until you know what a serving size looks like  · Eat 3 meals and 1 or 2 snacks each day  Plan your meals in advance  Luisa Moncada and eat at home most of the time  Eat slowly  · Eat fruits and vegetables at every meal   They are low in calories and high in fiber, which makes you feel full  Do not add butter, margarine, or cream sauce to vegetables  Use herbs to season steamed vegetables  · Eat less fat and fewer fried foods  Eat more baked or grilled chicken and fish  These protein sources are lower in calories and fat than red meat  Limit fast food  Dress your salads with olive oil and vinegar instead of bottled dressing  · Limit the amount of sugar you eat  Do not drink sugary beverages  Limit alcohol  Activity changes:  Physical activity is good for your body in many ways  It helps you burn calories and build strong muscles  It decreases stress and depression, and improves your mood  It can also help you sleep better  Talk to your healthcare provider before you begin an exercise program   · Exercise for at least 30 minutes 5 days a week  Start slowly  Set aside time each day for physical activity that you enjoy and that is convenient for you  It is best to do both weight training and an activity that increases your heart rate, such as walking, bicycling, or swimming  · Find ways to be more active  Do yard work and housecleaning  Walk up the stairs instead of using elevators  Spend your leisure time going to events that require walking, such as outdoor festivals or fairs  This extra physical activity can help you lose weight and keep it off  Follow up with your healthcare provider as directed: You may need to meet with a dietitian  Write down your questions so you remember to ask them during your visits     © 2017 [address] [employer] Information is for End User's use only and may not be sold, redistributed or otherwise used for commercial purposes  All illustrations and images included in CareNotes® are the copyrighted property of [employer]  or Joao Cha  The above information is an  only  It is not intended as medical advice for individual conditions or treatments  Talk to your doctor, nurse or pharmacist before following any medical regimen to see if it is safe and effective for you  Urinary Incontinence   WHAT YOU NEED TO KNOW:   What is urinary incontinence? Urinary incontinence (UI) is when you lose control of your bladder  What causes UI? UI occurs because your bladder cannot store or empty urine properly  The following are the most common types of UI:  · Stress incontinence  is when you leak urine due to increased bladder pressure  This may happen when you cough, sneeze, or exercise  · Urge incontinence  is when you feel the need to urinate right away and leak urine accidentally  · Mixed incontinence  is when you have both stress and urge UI  What are the signs and symptoms of UI?   · You feel like your bladder does not empty completely when you urinate  · You urinate often and need to urinate immediately  · You leak urine when you sleep, or you wake up with the urge to urinate  · You leak urine when you cough, sneeze, exercise, or laugh  How is UI diagnosed? Your healthcare provider will ask how often you leak urine and whether you have stress or urge symptoms  Tell him which medicines you take, how often you urinate, and how much liquid you drink each day  You may need any of the following tests:  · Urine tests  may show infection or kidney function  · A pelvic exam  may be done to check for blockages  A pelvic exam will also show if your bladder, uterus, or other organs have moved out of place  · An x-ray, ultrasound, or CT  may show problems with parts of your urinary system   You may be given contrast liquid to help your organs show up better in the pictures  Tell the healthcare provider if you have ever had an allergic reaction to contrast liquid  Do not enter the MRI room with anything metal  Metal can cause serious injury  Tell the healthcare provider if you have any metal in or on your body  · A bladder scan  will show how much urine is left in your bladder after you urinate  You will be asked to urinate and then healthcare providers will use a small ultrasound machine to check the urine left in your bladder  · Cystometry  is used to check the function of your urinary system  Your healthcare provider checks the pressure in your bladder while filling it with fluid  Your bladder pressure may also be tested when your bladder is full and while you urinate  How is UI treated? · Medicines  can help strengthen your bladder control  · Electrical stimulation  is used to send a small amount of electrical energy to your pelvic floor muscles  This helps control your bladder function  Electrodes may be placed outside your body or in your rectum  For women, the electrodes may be placed in the vagina  · A bulking agent  may be injected into the wall of your urethra to make it thicker  This helps keep your urethra closed and decreases urine leakage  · Devices  such as a clamp, pessary, or tampon may help stop urine leaks  Ask your healthcare provider for more information about these and other devices  · Surgery  may be needed if other treatments do not work  Several types of surgery can help improve your bladder control  Ask your healthcare provider for more information about the surgery you may need  How can I manage my symptoms? · Do pelvic muscle exercises often  Your pelvic muscles help you stop urinating  Squeeze these muscles tight for 5 seconds, then relax for 5 seconds  Gradually work up to squeezing for 10 seconds  Do 3 sets of 15 repetitions a day, or as directed   This will help strengthen your pelvic muscles and improve bladder control  · A catheter  may be used to help empty your bladder  A catheter is a tiny, plastic tube that is put into your bladder to drain your urine  Your healthcare provider may tell you to use a catheter to prevent your bladder from getting too full and leaking urine  · Keep a UI record  Write down how often you leak urine and how much you leak  Make a note of what you were doing when you leaked urine  · Train your bladder  Go to the bathroom at set times, such as every 2 hours, even if you do not feel the urge to go  You can also try to hold your urine when you feel the urge to go  For example, hold your urine for 5 minutes when you feel the urge to go  As that becomes easier, hold your urine for 10 minutes  · Drink liquids as directed  Ask your healthcare provider how much liquid to drink each day and which liquids are best for you  You may need to limit the amount of liquid you drink to help control your urine leakage  Limit or do not have drinks that contain caffeine or alcohol  Do not drink any liquid right before you go to bed  · Prevent constipation  Eat a variety of high-fiber foods  Good examples are high-fiber cereals, beans, vegetables, and whole-grain breads  Prune juice may help make your bowel movement softer  Walking is the best way to trigger your intestines to have a bowel movement  · Exercise regularly and maintain a healthy weight  Ask your healthcare provider how much you should weigh and about the best exercise plan for you  Weight loss and exercise will decrease pressure on your bladder and help you control your leakage  Ask him to help you create a weight loss plan if you are overweight  When should I seek immediate care? · You have severe pain  · You are confused or cannot think clearly  When should I contact my healthcare provider? · You have a fever  · You see blood in your urine      · You have pain when you urinate  · You have new or worse pain, even after treatment  · Your mouth feels dry or you have vision changes  · Your urine is cloudy or smells bad  · You have questions or concerns about your condition or care  CARE AGREEMENT:   You have the right to help plan your care  Learn about your health condition and how it may be treated  Discuss treatment options with your caregivers to decide what care you want to receive  You always have the right to refuse treatment  The above information is an  only  It is not intended as medical advice for individual conditions or treatments  Talk to your doctor, nurse or pharmacist before following any medical regimen to see if it is safe and effective for you  © 2017 [facility] Information is for End User's use only and may not be sold, redistributed or otherwise used for commercial purposes  All illustrations and images included in CareNotes® are the copyrighted property of [employer]  or Joao Semaj  Cigarette Smoking and Your Health   AMBULATORY CARE:   Risks to your health if you smoke:  Nicotine and other chemicals found in tobacco damage every cell in your body  Even if you are a light smoker, you have an increased risk for cancer, heart disease, and lung disease  If you are pregnant or have diabetes, smoking increases your risk for complications  Benefits to your health if you stop smoking:   · You decrease respiratory symptoms such as coughing, wheezing, and shortness of breath  · You reduce your risk for cancers of the lung, mouth, throat, kidney, bladder, pancreas, stomach, and cervix  If you already have cancer, you increase the benefits of chemotherapy  You also reduce your risk for cancer returning or a second cancer from developing  · You reduce your risk for heart disease, blood clots, heart attack, and stroke       · You reduce your risk for lung infections, and diseases such as pneumonia, asthma, chronic bronchitis, and emphysema  · Your circulation improves  More oxygen can be delivered to your body  If you have diabetes, you lower your risk for complications, such as kidney, artery, and eye diseases  You also lower your risk for nerve damage  Nerve damage can lead to amputations, poor vision, and blindness  · You improve your body's ability to heal and to fight infections  Benefits to the health of others if you stop smoking:  Tobacco is harmful to nonsmokers who breathe in your secondhand smoke  The following are ways the health of others around you may improve when you stop smoking:  · You lower the risks for lung cancer and heart disease in nonsmoking adults  · If you are pregnant, you lower the risk for miscarriage, early delivery, low birth weight, and stillbirth  You also lower your baby's risk for SIDS, obesity, developmental delay, and neurobehavioral problems, such as ADHD  · If you have children, you lower their risk for ear infections, colds, pneumonia, bronchitis, and asthma  For more information and support to stop smoking:   · [employer]  Phone: 4- 047 - 220-0730  Web Address: [employer]  Follow up with your healthcare provider as directed:  Write down your questions so you remember to ask them during your visits  © 2017 [address] [employer] Information is for End User's use only and may not be sold, redistributed or otherwise used for commercial purposes  All illustrations and images included in CareNotes® are the copyrighted property of [employer]  or Joao Cha  The above information is an  only  It is not intended as medical advice for individual conditions or treatments  Talk to your doctor, nurse or pharmacist before following any medical regimen to see if it is safe and effective for you  Fall Prevention   WHAT YOU NEED TO KNOW:   What is fall prevention?   Fall prevention includes ways to make your home and other areas safer  It also includes ways you can move more carefully to prevent a fall  What increases my risk for falls? · Lack of vitamin D    · Not getting enough sleep each night    · Trouble walking or keeping your balance, or foot problems    · Health conditions that cause changes in your blood pressure, vision, or muscle strength and coordination    · Medicines that make you dizzy, weak, or sleepy    · Problems seeing clearly    · Shoes that have high heels or are not supportive    · Tripping hazards, such as items left on the floor, no handrails on the stairs, or broken steps  How can I help protect myself from falls? · Stand or sit up slowly  This may help you keep your balance and prevent falls  If you need to get up during the night, sit up first  Be sure you are fully awake before you stand  Turn on the light before you start walking  Go slowly in case you are still sleepy  Make sure you will not trip over any pets sleeping in the bedroom  · Use assistive devices as directed  Your healthcare provider may suggest that you use a cane or walker to help you keep your balance  You may need to have grab bars put in your bathroom near the toilet or in the shower  · Wear shoes that fit well and have soles that   Wear shoes both inside and outside  Use slippers with good   Do not wear shoes with high heels  · Wear a personal alarm  This is a device that allows you to call 911 if you fall and need help  Ask your healthcare provider for more information  · Stay active  Exercise can help strengthen your muscles and improve your balance  Your healthcare provider may recommend water aerobics or walking  He or she may also recommend physical therapy to improve your coordination  Never start an exercise program without talking to your healthcare provider first      · Manage medical conditions  Keep all appointments with your healthcare providers   Visit your eye doctor as directed  How can I make my home safer? · Add items to prevent falls in the bathroom  Put nonslip strips on your bath or shower floor to prevent you from slipping  Use a bath mat if you do not have carpet in the bathroom  This will prevent you from falling when you step out of the bath or shower  Use a shower seat so you do not need to stand while you shower  Sit on the toilet or a chair in your bathroom to dry yourself and put on clothing  This will prevent you from losing your balance from drying or dressing yourself while you are standing  · Keep paths clear  Remove books, shoes, and other objects from walkways and stairs  Place cords for telephones and lamps out of the way so that you do not need to walk over them  Tape them down if you cannot move them  Remove small rugs  If you cannot remove a rug, secure it with double-sided tape  This will prevent you from tripping  · Install bright lights in your home  Use night lights to help light paths to the bathroom or kitchen  Always turn on the light before you start walking  · Keep items you use often on shelves within reach  Do not use a step stool to help you reach an item  · Paint or place reflective tape on the edges of your stairs  This will help you see the stairs better  Call 911 or have someone else call if:   · You have fallen and are unconscious  · You have fallen and cannot move part of your body  Contact your healthcare provider if:   · You have fallen and have pain or a headache  · You have questions or concerns about your condition or care  CARE AGREEMENT:   You have the right to help plan your care  Learn about your health condition and how it may be treated  Discuss treatment options with your caregivers to decide what care you want to receive  You always have the right to refuse treatment  The above information is an  only   It is not intended as medical advice for individual conditions or treatments  Talk to your doctor, nurse or pharmacist before following any medical regimen to see if it is safe and effective for you  © 2017 [address] [employer] Information is for End User's use only and may not be sold, redistributed or otherwise used for commercial purposes  All illustrations and images included in CareNotes® are the copyrighted property of [employer]  or Joao Cha  Advance Directives   WHAT YOU NEED TO KNOW:   What are advance directives? Advance directives are legal documents that state your wishes and plans for medical care  These plans are made ahead of time in case you lose your ability to make decisions for yourself  Advance directives can apply to any medical decision, such as the treatments you want, and if you want to donate organs  What are the types of advance directives? There are many types of advance directives, and each state has rules about how to use them  You may choose a combination of any of the following:  · Living will: This is a written record of the treatment you want  You can also choose which treatments you do not want, which to limit, and which to stop at a certain time  This includes surgery, medicine, IV fluid, and tube feedings  · Durable power of  for healthcare [place] [facility]): This is a written record that states who you want to make healthcare choices for you when you are unable to make them for yourself  This person, called a proxy, is usually a family member or a friend  You may choose more than 1 proxy  · Do not resuscitate (DNR) order:  A DNR order is used in case your heart stops beating or you stop breathing  It is a request not to have certain forms of treatment, such as CPR  A DNR order may be included in other types of advance directives  · Medical directive: This covers the care that you want if you are in a coma, near death, or unable to make decisions for yourself   You can list the treatments you want for each condition  Treatment may include pain medicine, surgery, blood transfusions, dialysis, IV or tube feedings, and a ventilator (breathing machine)  · Values history: This document has questions about your views, beliefs, and how you feel and think about life  This information can help others choose the care that you would choose  Why are advance directives important? An advance directive helps you control your care  Although spoken wishes may be used, it is better to have your wishes written down  Spoken wishes can be misunderstood, or not followed  Treatments may be given even if you do not want them  An advance directive may make it easier for your family to make difficult choices about your care  How do I decide what to put in my advance directives? · Make informed decisions:  Make sure you fully understand treatments or care you may receive  Think about the benefits and problems your decisions could cause for you or your family  Talk to healthcare providers if you have concerns or questions before you write down your wishes  You may also want to talk with your Evangel[employer] or , or a   Check your state laws to make sure that what you put in your advance directive is legal      · Sign all forms:  Sign and date your advance directive when you have finished  You may also need 2 witnesses to sign the forms  Witnesses cannot be your doctor or his staff, your spouse, heirs or beneficiaries, people you owe money to, or your chosen proxy  Talk to your family, proxy, and healthcare providers about your advance directive  Give each person a copy, and keep one for yourself in a place you can get to easily  Do not keep it hidden or locked away  · Review and revise your plans: You can revise your advance directive at any time, as long as you are able to make decisions  Review your plan every year, and when there are changes in your life, or your health   When you make changes, let your family, proxy, and healthcare providers know  Give each a new copy  Where can I find more information? · [employer]  [address] , [address]  Phone: [phone]  Phone: [phone]  Web Address: http://www  aa org  · [address] [facility])  [address], [address]  Phone: [phone]  Phone: [phone]  Web Address: Adilia chilel  CARE AGREEMENT:   You have the right to help plan your care  To help with this plan, you must learn about your health condition and treatment options  You must also learn about advance directives and how they are used  Work with your healthcare providers to decide what care will be used to treat you  You always have the right to refuse treatment  The above information is an  only  It is not intended as medical advice for individual conditions or treatments  Talk to your doctor, nurse or pharmacist before following any medical regimen to see if it is safe and effective for you  © 2017 [address]  Information is for End User's use only and may not be sold, redistributed or otherwise used for commercial purposes  All illustrations and images included in CareNotes® are the copyrighted property of A D A M , Inc  or Jo[employer] Cha

## 2018-12-05 ENCOUNTER — TELEPHONE (OUTPATIENT)
Dept: INTERNAL MEDICINE CLINIC | Facility: CLINIC | Age: 63
End: 2018-12-05

## 2018-12-05 NOTE — TELEPHONE ENCOUNTER
Wife called, pt c/o of itching all over his body, happens every winter, has no rash or redness, tried different OTC creams and lotions, nothing is helping, asking if you would give him something before he would need to see derm?  pls advise

## 2018-12-05 NOTE — TELEPHONE ENCOUNTER
Call pt, mostly is eczema/dry skin from the decrease humidity  Recommend he try taking a bath with some OTC moisturizers, like oil of olay first for several days

## 2019-01-31 ENCOUNTER — APPOINTMENT (OUTPATIENT)
Dept: LAB | Facility: HOSPITAL | Age: 64
End: 2019-01-31
Attending: INTERNAL MEDICINE
Payer: MEDICARE

## 2019-01-31 ENCOUNTER — TRANSCRIBE ORDERS (OUTPATIENT)
Dept: ADMINISTRATIVE | Facility: HOSPITAL | Age: 64
End: 2019-01-31

## 2019-01-31 DIAGNOSIS — C78.00 MALIGNANT NEOPLASM METASTATIC TO LUNG, UNSPECIFIED LATERALITY (HCC): ICD-10-CM

## 2019-01-31 DIAGNOSIS — R91.8 PULMONARY NODULES: ICD-10-CM

## 2019-01-31 DIAGNOSIS — C79.31 METASTASIS TO BRAIN (HCC): ICD-10-CM

## 2019-01-31 DIAGNOSIS — C64.2 MALIGNANT NEOPLASM OF LEFT KIDNEY, EXCEPT RENAL PELVIS (HCC): ICD-10-CM

## 2019-01-31 DIAGNOSIS — D64.89 ANEMIA DUE TO MULTIPLE MECHANISMS: Primary | ICD-10-CM

## 2019-01-31 DIAGNOSIS — Z92.3 S/P RADIATION THERAPY: ICD-10-CM

## 2019-01-31 DIAGNOSIS — C79.51 BONE METASTASIS (HCC): ICD-10-CM

## 2019-01-31 DIAGNOSIS — D64.89 ANEMIA DUE TO MULTIPLE MECHANISMS: ICD-10-CM

## 2019-01-31 LAB
ALBUMIN SERPL BCP-MCNC: 4.4 G/DL (ref 3.5–5.7)
ALP SERPL-CCNC: 78 U/L (ref 55–165)
ALT SERPL W P-5'-P-CCNC: 14 U/L (ref 7–52)
ANION GAP SERPL CALCULATED.3IONS-SCNC: 6 MMOL/L (ref 4–13)
AST SERPL W P-5'-P-CCNC: 15 U/L (ref 13–39)
BASOPHILS # BLD AUTO: 0 THOUSANDS/ΜL (ref 0–0.1)
BASOPHILS NFR BLD AUTO: 0 % (ref 0–2)
BILIRUB SERPL-MCNC: 0.6 MG/DL (ref 0.2–1)
BUN SERPL-MCNC: 22 MG/DL (ref 7–25)
CALCIUM SERPL-MCNC: 9.9 MG/DL (ref 8.6–10.5)
CHLORIDE SERPL-SCNC: 102 MMOL/L (ref 98–107)
CO2 SERPL-SCNC: 31 MMOL/L (ref 21–31)
CREAT SERPL-MCNC: 1.08 MG/DL (ref 0.7–1.3)
EOSINOPHIL # BLD AUTO: 0 THOUSAND/ΜL (ref 0–0.61)
EOSINOPHIL NFR BLD AUTO: 1 % (ref 0–5)
ERYTHROCYTE [DISTWIDTH] IN BLOOD BY AUTOMATED COUNT: 12.9 % (ref 11.5–14.5)
GFR SERPL CREATININE-BSD FRML MDRD: 73 ML/MIN/1.73SQ M
GLUCOSE SERPL-MCNC: 81 MG/DL (ref 65–99)
HCT VFR BLD AUTO: 41.1 % (ref 36.5–49.3)
HGB BLD-MCNC: 13.4 G/DL (ref 14–18)
LYMPHOCYTES # BLD AUTO: 0.9 THOUSANDS/ΜL (ref 0.6–4.47)
LYMPHOCYTES NFR BLD AUTO: 18 % (ref 21–51)
MCH RBC QN AUTO: 29.6 PG (ref 26–34)
MCHC RBC AUTO-ENTMCNC: 32.6 G/DL (ref 31–37)
MCV RBC AUTO: 91 FL (ref 81–99)
MONOCYTES # BLD AUTO: 0.5 THOUSAND/ΜL (ref 0.17–1.22)
MONOCYTES NFR BLD AUTO: 11 % (ref 2–12)
NEUTROPHILS # BLD AUTO: 3.6 THOUSANDS/ΜL (ref 1.4–6.5)
NEUTS SEG NFR BLD AUTO: 70 % (ref 42–75)
NRBC BLD AUTO-RTO: 0 /100 WBCS
PLATELET # BLD AUTO: 181 THOUSANDS/UL (ref 149–390)
PMV BLD AUTO: 8.4 FL (ref 8.6–11.7)
POTASSIUM SERPL-SCNC: 5 MMOL/L (ref 3.5–5.5)
PROT SERPL-MCNC: 6.9 G/DL (ref 6.4–8.9)
RBC # BLD AUTO: 4.53 MILLION/UL (ref 4.3–5.9)
SODIUM SERPL-SCNC: 139 MMOL/L (ref 134–143)
WBC # BLD AUTO: 5.1 THOUSAND/UL (ref 4.8–10.8)

## 2019-01-31 PROCEDURE — 36415 COLL VENOUS BLD VENIPUNCTURE: CPT

## 2019-01-31 PROCEDURE — 80053 COMPREHEN METABOLIC PANEL: CPT

## 2019-01-31 PROCEDURE — 85025 COMPLETE CBC W/AUTO DIFF WBC: CPT

## 2019-04-11 DIAGNOSIS — J30.9 ALLERGIC RHINITIS, UNSPECIFIED SEASONALITY, UNSPECIFIED TRIGGER: Primary | ICD-10-CM

## 2019-04-11 RX ORDER — MONTELUKAST SODIUM 10 MG/1
10 TABLET ORAL
Qty: 90 TABLET | Refills: 1 | Status: SHIPPED | OUTPATIENT
Start: 2019-04-11 | End: 2019-09-03

## 2019-04-11 RX ORDER — MONTELUKAST SODIUM 10 MG/1
10 TABLET ORAL
COMMUNITY
End: 2019-04-11 | Stop reason: SDUPTHER

## 2019-05-01 ENCOUNTER — OFFICE VISIT (OUTPATIENT)
Dept: INTERNAL MEDICINE CLINIC | Facility: CLINIC | Age: 64
End: 2019-05-01
Payer: MEDICARE

## 2019-05-01 VITALS
RESPIRATION RATE: 16 BRPM | SYSTOLIC BLOOD PRESSURE: 108 MMHG | TEMPERATURE: 98.6 F | HEIGHT: 68 IN | DIASTOLIC BLOOD PRESSURE: 68 MMHG | HEART RATE: 72 BPM | WEIGHT: 184 LBS | BODY MASS INDEX: 27.89 KG/M2

## 2019-05-01 DIAGNOSIS — H35.81 MACULAR EDEMA: ICD-10-CM

## 2019-05-01 DIAGNOSIS — E78.2 MIXED HYPERLIPIDEMIA: ICD-10-CM

## 2019-05-01 DIAGNOSIS — I48.0 PAROXYSMAL ATRIAL FIBRILLATION (HCC): ICD-10-CM

## 2019-05-01 DIAGNOSIS — C79.51 BONE METASTASIS (HCC): ICD-10-CM

## 2019-05-01 DIAGNOSIS — F41.1 GENERALIZED ANXIETY DISORDER: ICD-10-CM

## 2019-05-01 DIAGNOSIS — D50.9 IRON DEFICIENCY ANEMIA, UNSPECIFIED IRON DEFICIENCY ANEMIA TYPE: ICD-10-CM

## 2019-05-01 DIAGNOSIS — E66.3 OVERWEIGHT (BMI 25.0-29.9): ICD-10-CM

## 2019-05-01 DIAGNOSIS — C79.31 METASTASIS TO BRAIN (HCC): ICD-10-CM

## 2019-05-01 DIAGNOSIS — I10 BENIGN ESSENTIAL HYPERTENSION: Primary | ICD-10-CM

## 2019-05-01 DIAGNOSIS — M15.9 GENERALIZED OSTEOARTHRITIS: ICD-10-CM

## 2019-05-01 DIAGNOSIS — Z13.1 SCREENING FOR DIABETES MELLITUS (DM): ICD-10-CM

## 2019-05-01 DIAGNOSIS — C78.00 MALIGNANT NEOPLASM METASTATIC TO LUNG, UNSPECIFIED LATERALITY (HCC): ICD-10-CM

## 2019-05-01 PROCEDURE — 99214 OFFICE O/P EST MOD 30 MIN: CPT | Performed by: INTERNAL MEDICINE

## 2019-08-26 ENCOUNTER — TRANSCRIBE ORDERS (OUTPATIENT)
Dept: LAB | Facility: CLINIC | Age: 64
End: 2019-08-26

## 2019-08-26 ENCOUNTER — APPOINTMENT (OUTPATIENT)
Dept: LAB | Facility: CLINIC | Age: 64
End: 2019-08-26
Payer: MEDICARE

## 2019-08-26 DIAGNOSIS — C78.00 MALIGNANT NEOPLASM METASTATIC TO LUNG, UNSPECIFIED LATERALITY (HCC): ICD-10-CM

## 2019-08-26 DIAGNOSIS — C79.51 BONE METASTASIS (HCC): ICD-10-CM

## 2019-08-26 DIAGNOSIS — C64.2 MALIGNANT NEOPLASM OF LEFT KIDNEY, EXCEPT RENAL PELVIS (HCC): ICD-10-CM

## 2019-08-26 DIAGNOSIS — C79.31 METASTASIS TO BRAIN (HCC): ICD-10-CM

## 2019-08-26 DIAGNOSIS — D50.9 IRON DEFICIENCY ANEMIA, UNSPECIFIED IRON DEFICIENCY ANEMIA TYPE: ICD-10-CM

## 2019-08-26 DIAGNOSIS — E78.2 MIXED HYPERLIPIDEMIA: ICD-10-CM

## 2019-08-26 DIAGNOSIS — Z13.1 SCREENING FOR DIABETES MELLITUS (DM): ICD-10-CM

## 2019-08-26 DIAGNOSIS — Z92.3 PERSONAL HISTORY OF RADIATION THERAPY: ICD-10-CM

## 2019-08-26 DIAGNOSIS — R91.8 PULMONARY NODULES: ICD-10-CM

## 2019-08-26 DIAGNOSIS — I10 BENIGN ESSENTIAL HYPERTENSION: ICD-10-CM

## 2019-08-26 DIAGNOSIS — C79.51 BONE METASTASES (HCC): ICD-10-CM

## 2019-08-26 DIAGNOSIS — D64.89 ANEMIA DUE TO MULTIPLE MECHANISMS: ICD-10-CM

## 2019-08-26 DIAGNOSIS — D64.89 ANEMIA DUE TO MULTIPLE MECHANISMS: Primary | ICD-10-CM

## 2019-08-26 LAB
ALBUMIN SERPL BCP-MCNC: 4.6 G/DL (ref 3.5–5)
ALP SERPL-CCNC: 96 U/L (ref 46–116)
ALT SERPL W P-5'-P-CCNC: 22 U/L (ref 12–78)
ANION GAP SERPL CALCULATED.3IONS-SCNC: 5 MMOL/L (ref 4–13)
AST SERPL W P-5'-P-CCNC: 15 U/L (ref 5–45)
BASOPHILS # BLD AUTO: 0.02 THOUSANDS/ΜL (ref 0–0.1)
BASOPHILS NFR BLD AUTO: 0 % (ref 0–1)
BILIRUB SERPL-MCNC: 0.82 MG/DL (ref 0.2–1)
BUN SERPL-MCNC: 23 MG/DL (ref 5–25)
CALCIUM SERPL-MCNC: 10.1 MG/DL (ref 8.3–10.1)
CHLORIDE SERPL-SCNC: 107 MMOL/L (ref 100–108)
CO2 SERPL-SCNC: 29 MMOL/L (ref 21–32)
CREAT SERPL-MCNC: 1.07 MG/DL (ref 0.6–1.3)
EOSINOPHIL # BLD AUTO: 0.09 THOUSAND/ΜL (ref 0–0.61)
EOSINOPHIL NFR BLD AUTO: 2 % (ref 0–6)
ERYTHROCYTE [DISTWIDTH] IN BLOOD BY AUTOMATED COUNT: 12.3 % (ref 11.6–15.1)
EST. AVERAGE GLUCOSE BLD GHB EST-MCNC: 103 MG/DL
GFR SERPL CREATININE-BSD FRML MDRD: 73 ML/MIN/1.73SQ M
GLUCOSE P FAST SERPL-MCNC: 84 MG/DL (ref 65–99)
HBA1C MFR BLD: 5.2 % (ref 4.2–6.3)
HCT VFR BLD AUTO: 41.4 % (ref 36.5–49.3)
HGB BLD-MCNC: 13.3 G/DL (ref 12–17)
IMM GRANULOCYTES # BLD AUTO: 0.02 THOUSAND/UL (ref 0–0.2)
IMM GRANULOCYTES NFR BLD AUTO: 0 % (ref 0–2)
LDLC SERPL DIRECT ASSAY-MCNC: 114 MG/DL (ref 0–100)
LYMPHOCYTES # BLD AUTO: 1.2 THOUSANDS/ΜL (ref 0.6–4.47)
LYMPHOCYTES NFR BLD AUTO: 21 % (ref 14–44)
MCH RBC QN AUTO: 29.5 PG (ref 26.8–34.3)
MCHC RBC AUTO-ENTMCNC: 32.1 G/DL (ref 31.4–37.4)
MCV RBC AUTO: 92 FL (ref 82–98)
MONOCYTES # BLD AUTO: 0.58 THOUSAND/ΜL (ref 0.17–1.22)
MONOCYTES NFR BLD AUTO: 10 % (ref 4–12)
NEUTROPHILS # BLD AUTO: 3.77 THOUSANDS/ΜL (ref 1.85–7.62)
NEUTS SEG NFR BLD AUTO: 67 % (ref 43–75)
NRBC BLD AUTO-RTO: 0 /100 WBCS
PLATELET # BLD AUTO: 183 THOUSANDS/UL (ref 149–390)
PMV BLD AUTO: 10.8 FL (ref 8.9–12.7)
POTASSIUM SERPL-SCNC: 4.7 MMOL/L (ref 3.5–5.3)
PROT SERPL-MCNC: 7.6 G/DL (ref 6.4–8.2)
RBC # BLD AUTO: 4.51 MILLION/UL (ref 3.88–5.62)
SODIUM SERPL-SCNC: 141 MMOL/L (ref 136–145)
TRIGL SERPL-MCNC: 90 MG/DL
WBC # BLD AUTO: 5.68 THOUSAND/UL (ref 4.31–10.16)

## 2019-08-26 PROCEDURE — 84478 ASSAY OF TRIGLYCERIDES: CPT

## 2019-08-26 PROCEDURE — 36415 COLL VENOUS BLD VENIPUNCTURE: CPT

## 2019-08-26 PROCEDURE — 85025 COMPLETE CBC W/AUTO DIFF WBC: CPT

## 2019-08-26 PROCEDURE — 83721 ASSAY OF BLOOD LIPOPROTEIN: CPT

## 2019-08-26 PROCEDURE — 80053 COMPREHEN METABOLIC PANEL: CPT

## 2019-08-26 PROCEDURE — 83036 HEMOGLOBIN GLYCOSYLATED A1C: CPT

## 2019-09-03 ENCOUNTER — OFFICE VISIT (OUTPATIENT)
Dept: INTERNAL MEDICINE CLINIC | Facility: CLINIC | Age: 64
End: 2019-09-03
Payer: MEDICARE

## 2019-09-03 VITALS
BODY MASS INDEX: 27.89 KG/M2 | RESPIRATION RATE: 14 BRPM | SYSTOLIC BLOOD PRESSURE: 120 MMHG | DIASTOLIC BLOOD PRESSURE: 70 MMHG | HEART RATE: 68 BPM | WEIGHT: 184 LBS | HEIGHT: 68 IN | TEMPERATURE: 97.4 F

## 2019-09-03 DIAGNOSIS — C64.2 MALIGNANT NEOPLASM OF LEFT KIDNEY, EXCEPT RENAL PELVIS (HCC): ICD-10-CM

## 2019-09-03 DIAGNOSIS — M15.9 GENERALIZED OSTEOARTHRITIS: ICD-10-CM

## 2019-09-03 DIAGNOSIS — I10 BENIGN ESSENTIAL HYPERTENSION: Primary | ICD-10-CM

## 2019-09-03 DIAGNOSIS — D50.9 IRON DEFICIENCY ANEMIA, UNSPECIFIED IRON DEFICIENCY ANEMIA TYPE: ICD-10-CM

## 2019-09-03 DIAGNOSIS — Z13.31 NEGATIVE DEPRESSION SCREENING: ICD-10-CM

## 2019-09-03 DIAGNOSIS — I48.0 PAROXYSMAL ATRIAL FIBRILLATION (HCC): ICD-10-CM

## 2019-09-03 DIAGNOSIS — F41.1 GENERALIZED ANXIETY DISORDER: ICD-10-CM

## 2019-09-03 DIAGNOSIS — E78.2 MIXED HYPERLIPIDEMIA: ICD-10-CM

## 2019-09-03 PROCEDURE — 99214 OFFICE O/P EST MOD 30 MIN: CPT | Performed by: INTERNAL MEDICINE

## 2019-09-03 NOTE — PATIENT INSTRUCTIONS

## 2019-09-03 NOTE — PROGRESS NOTES
Assessment/Plan:  Problem List Items Addressed This Visit        Cardiovascular and Mediastinum    Benign essential hypertension - Primary    Paroxysmal atrial fibrillation (HCC)       Musculoskeletal and Integument    Generalized osteoarthritis       Genitourinary    Malignant neoplasm of left kidney, except renal pelvis (HCC)       Other    Generalized anxiety disorder    Hyperlipidemia    Iron deficiency anemia      Other Visit Diagnoses     Negative depression screening               Diagnoses and all orders for this visit:    Benign essential hypertension    Paroxysmal atrial fibrillation (HCC)    Generalized osteoarthritis    Malignant neoplasm of left kidney, except renal pelvis (HCC)    Generalized anxiety disorder    Mixed hyperlipidemia    Iron deficiency anemia, unspecified iron deficiency anemia type    Negative depression screening        No problem-specific Assessment & Plan notes found for this encounter  A/P: Doing well and recent CT of the abd, chest and pelvis show no re-occurrence  Labs up to date  Flu shot in the fall  Keep f/u with the eye specialist for injections  Continue current treatment and RTC four months for routine  Subjective:      Patient ID: Winnie Bowman is a 61 y o  male  WM RTC for f/u htn, PAF, etc  Doing well and no new issues  Remains active w/o difficulty and no falls  Denies and headaches, slurred speech, facial droop, paralysis, or paresthesia  Renal cancer remains in remission  EMBER is good  Continues to f/u with the eye specialist for the AMD  Labs are up to date  The following portions of the patient's history were reviewed and updated as appropriate:   He has a past medical history of Abnormal eye finding, Anemia, Atrial fibrillation ([address] ), Generalized anxiety disorder, Hyperlipidemia, and Hypertension  ,  does not have any pertinent problems on file  ,   has a past surgical history that includes External ear surgery; Kidney surgery; Tibia fracture surgery; and Colonoscopy (N/A, 3/10/2017)  ,  family history includes Cancer in his paternal uncle; No Known Problems in his mother  ,   reports that he has quit smoking  He has never used smokeless tobacco  He reports that he does not drink alcohol  His drug history is not on file  ,  has No Known Allergies     Current Outpatient Medications   Medication Sig Dispense Refill    Bevacizumab (AVASTIN IV) Administer to both eyes q6 weeks       sertraline (ZOLOFT) 50 mg tablet Take 1 tablet (50 mg total) by mouth daily 90 tablet 3     No current facility-administered medications for this visit  Review of Systems   Constitutional: Negative for activity change, chills, diaphoresis, fatigue and fever  HENT: Negative  Eyes: Positive for visual disturbance  Respiratory: Negative for cough, chest tightness, shortness of breath and wheezing  Cardiovascular: Negative for chest pain, palpitations and leg swelling  Gastrointestinal: Negative for abdominal pain, constipation, diarrhea, nausea and vomiting  Endocrine: Negative for cold intolerance and heat intolerance  Genitourinary: Negative for difficulty urinating, dysuria and frequency  Musculoskeletal: Negative for arthralgias, gait problem and myalgias  Neurological: Negative for dizziness, seizures, syncope, weakness, light-headedness and headaches  Psychiatric/Behavioral: Negative for confusion, dysphoric mood and sleep disturbance  The patient is not nervous/anxious  PHQ-9 Depression Screening    PHQ-9:    Frequency of the following problems over the past two weeks:       Little interest or pleasure in doing things:  0 - not at all  Feeling down, depressed, or hopeless:  0 - not at all  PHQ-2 Score:  0        Objective:  Vitals:    09/03/19 1129   BP: 120/70   Pulse: 68   Resp: 14   Temp: (!) 97 4 °F (36 3 °C)   TempSrc: Tympanic   Weight: 83 5 kg (184 lb)   Height: 5' 8" (1 727 m)     Body mass index is 27 98 kg/m²       Physical Exam Constitutional: He is oriented to person, place, and time  He appears well-developed and well-nourished  No distress  HENT:   Head: Normocephalic and atraumatic  Mouth/Throat: Oropharynx is clear and moist    Eyes: Pupils are equal, round, and reactive to light  Conjunctivae and EOM are normal    Neck: Neck supple  No JVD present  Cardiovascular: Normal rate, regular rhythm and normal heart sounds  Pulmonary/Chest: Effort normal and breath sounds normal  No respiratory distress  He has no wheezes  He has no rales  Abdominal: Soft  Bowel sounds are normal  He exhibits no distension  There is no tenderness  Musculoskeletal: He exhibits no edema  Neurological: He is alert and oriented to person, place, and time  Psychiatric: He has a normal mood and affect  His behavior is normal  Judgment and thought content normal    Nursing note and vitals reviewed

## 2019-09-09 ENCOUNTER — HOSPITAL ENCOUNTER (OUTPATIENT)
Dept: MRI IMAGING | Facility: HOSPITAL | Age: 64
Discharge: HOME | End: 2019-09-09
Attending: OTOLARYNGOLOGY
Payer: MEDICARE

## 2019-09-09 DIAGNOSIS — H90.3 ASYMMETRICAL SENSORINEURAL HEARING LOSS: ICD-10-CM

## 2019-09-09 PROCEDURE — A9585 GADOBUTROL INJECTION: HCPCS | Performed by: OTOLARYNGOLOGY

## 2019-09-09 PROCEDURE — 70553 MRI BRAIN STEM W/O & W/DYE: CPT

## 2019-09-09 RX ADMIN — GADOBUTROL 8 ML: 604.72 INJECTION INTRAVENOUS at 16:27

## 2019-09-16 ENCOUNTER — OFFICE VISIT (OUTPATIENT)
Dept: INTERNAL MEDICINE CLINIC | Facility: CLINIC | Age: 64
End: 2019-09-16
Payer: MEDICARE

## 2019-09-16 VITALS
RESPIRATION RATE: 16 BRPM | HEART RATE: 68 BPM | HEIGHT: 68 IN | WEIGHT: 184 LBS | OXYGEN SATURATION: 98 % | BODY MASS INDEX: 27.89 KG/M2 | TEMPERATURE: 98.3 F | DIASTOLIC BLOOD PRESSURE: 76 MMHG | SYSTOLIC BLOOD PRESSURE: 122 MMHG

## 2019-09-16 DIAGNOSIS — Z23 ENCOUNTER FOR VACCINATION: ICD-10-CM

## 2019-09-16 DIAGNOSIS — Z23 NEED FOR INFLUENZA VACCINATION: Primary | ICD-10-CM

## 2019-09-16 DIAGNOSIS — C79.31 METASTASIS TO BRAIN (HCC): ICD-10-CM

## 2019-09-16 DIAGNOSIS — R93.0 ABNORMAL MRI OF HEAD: ICD-10-CM

## 2019-09-16 DIAGNOSIS — C64.2 MALIGNANT NEOPLASM OF LEFT KIDNEY, EXCEPT RENAL PELVIS (HCC): ICD-10-CM

## 2019-09-16 PROCEDURE — 90682 RIV4 VACC RECOMBINANT DNA IM: CPT | Performed by: INTERNAL MEDICINE

## 2019-09-16 PROCEDURE — 99213 OFFICE O/P EST LOW 20 MIN: CPT | Performed by: INTERNAL MEDICINE

## 2019-09-16 PROCEDURE — G0008 ADMIN INFLUENZA VIRUS VAC: HCPCS | Performed by: INTERNAL MEDICINE

## 2019-09-16 NOTE — PROGRESS NOTES
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[facility])    Relevant Orders    Ambulatory referral to Neurology       Genitourinary    Malignant neoplasm of left kidney, except renal pelvis [facility])    Relevant Orders    Ambulatory referral to Neurology      Other Visit Diagnoses     Need for influenza vaccination    -  Primary    Relevant Orders    influenza vaccine, [id number], quadrivalent, recombinant, PF, 0 5 mL, for patients 18 yr+ (FLUBLOK) (Completed)    Abnormal MRI of head        Relevant Orders    Ambulatory referral to Neurology    Encounter for vaccination               Diagnoses and all orders for this visit:    Need for influenza vaccination  -     influenza vaccine, [id number], quadrivalent, recombinant, PF, 0 5 mL, for patients 18 yr+ (FLUBLOK)    Abnormal MRI of head  -     Ambulatory referral to Neurology; Future    Malignant neoplasm of left kidney, except renal pelvis [facility])  -     Ambulatory referral to Neurology; Future    Metastasis to brain [facility])  -     Ambulatory referral to Neurology; Future    Encounter for vaccination        No problem-specific Assessment & Plan notes found for this encounter  A/P: I have spent 20 minutes with Patient and family today in which greater than 50% of this time was spent in counseling/coordination of care regarding Diagnostic results, Risks and benefits of tx options, Intructions for management, Patient and family education and Impressions  Discussed the imagining  Both ENT and myself feel that the findings are most likely due to normal aging process and prior brain mets and its' treatment  However, we feel it would be better to get a second opinion from neurology and will refer  Will update his flu  Vaccine  Continue current treatment and RTC as scheduled for routine  Subjective:      Patient ID: Osmar Newell is a 61 y o  male      WM with PMH of renal cancer with mets to the bones and brain s/p XRT and chemo, presents with his wife for f/u after a recent MRI of the brain ordered by ENT was abnormal  Pt is completely asymptomatic both mentally and physically  NO c/o's  The following portions of the patient's history were reviewed and updated as appropriate:   He has a past medical history of Abnormal eye finding, Anemia, Atrial fibrillation ([address] ), Generalized anxiety disorder, Hyperlipidemia, and Hypertension  ,  does not have any pertinent problems on file  ,   has a past surgical history that includes External ear surgery; Kidney surgery; Tibia fracture surgery; and Colonoscopy (N/A, 3/10/2017)  ,  family history includes Cancer in his paternal uncle; No Known Problems in his mother  ,   reports that he quit smoking about 20 years ago  His smoking use included cigarettes  He has never used smokeless tobacco  He reports that he does not drink alcohol or use drugs  ,  has No Known Allergies     Current Outpatient Medications   Medication Sig Dispense Refill    b complex vitamins capsule Take 1 capsule by mouth daily      Bevacizumab (AVASTIN IV) Administer to both eyes q6 weeks       sertraline (ZOLOFT) 50 mg tablet Take 1 tablet (50 mg total) by mouth daily 90 tablet 3     No current facility-administered medications for this visit  Review of Systems   Constitutional: Negative for activity change, chills, diaphoresis, fatigue and fever  Eyes: Positive for visual disturbance  Respiratory: Negative for cough, chest tightness, shortness of breath and wheezing  Cardiovascular: Negative for chest pain, palpitations and leg swelling  Gastrointestinal: Negative for abdominal pain, constipation, diarrhea, nausea and vomiting  Genitourinary: Negative for difficulty urinating, dysuria and frequency  Musculoskeletal: Negative for arthralgias, gait problem and myalgias     Neurological: Negative for dizziness, tremors, seizures, syncope, facial asymmetry, speech difficulty, weakness, light-headedness, numbness and headaches  Psychiatric/Behavioral: Negative for confusion  The patient is not nervous/anxious  PHQ-9 Depression Screening    PHQ-9:    Frequency of the following problems over the past two weeks:       Little interest or pleasure in doing things:  0 - not at all  Feeling down, depressed, or hopeless:  0 - not at all  PHQ-2 Score:  0        Objective:  Vitals:    09/16/19 1128   BP: 122/76   BP Location: Left arm   Patient Position: Sitting   Cuff Size: Large   Pulse: 68   Resp: 16   Temp: 98 3 °F (36 8 °C)   SpO2: 98%   Weight: 83 5 kg (184 lb)   Height: 5' 8" (1 727 m)     Body mass index is 27 98 kg/m²  Physical Exam   Constitutional: He is oriented to person, place, and time  He appears well-developed and well-nourished  No distress  HENT:   Head: Normocephalic and atraumatic  Mouth/Throat: Oropharynx is clear and moist    Eyes: Pupils are equal, round, and reactive to light  Conjunctivae and EOM are normal    Neurological: He is alert and oriented to person, place, and time  Psychiatric: He has a normal mood and affect  His behavior is normal  Judgment and thought content normal    Nursing note and vitals reviewed

## 2019-11-07 ENCOUNTER — OFFICE VISIT (OUTPATIENT)
Dept: URGENT CARE | Facility: CLINIC | Age: 64
End: 2019-11-07
Payer: MEDICARE

## 2019-11-07 VITALS
RESPIRATION RATE: 16 BRPM | SYSTOLIC BLOOD PRESSURE: 116 MMHG | TEMPERATURE: 97.9 F | HEIGHT: 68 IN | WEIGHT: 184 LBS | HEART RATE: 67 BPM | DIASTOLIC BLOOD PRESSURE: 78 MMHG | BODY MASS INDEX: 27.89 KG/M2 | OXYGEN SATURATION: 98 %

## 2019-11-07 DIAGNOSIS — J06.9 ACUTE URI: Primary | ICD-10-CM

## 2019-11-07 PROCEDURE — 99213 OFFICE O/P EST LOW 20 MIN: CPT | Performed by: NURSE PRACTITIONER

## 2019-11-07 PROCEDURE — G0463 HOSPITAL OUTPT CLINIC VISIT: HCPCS | Performed by: NURSE PRACTITIONER

## 2019-11-07 NOTE — PATIENT INSTRUCTIONS
Recommend over-the-counter Flonase and Mucinex  Rest and fluids  Can use over-the-counter saline rinse  If he develops any headache, dizziness, feeling lightheaded, high fever, shortness of breath, hemoptysis, chest pain, palpitations, or any new or concerning symptoms please return or proceed ER  Recommend following up with PCP in 3-5 days  Upper Respiratory Infection   WHAT YOU NEED TO KNOW:   An upper respiratory infection is also called the common cold  It is an infection that can affect your nose, throat, ears, and sinuses  For healthy people, the common cold is usually not serious and does not need special treatment  Cold symptoms are usually worst for the first 3 to 5 days  Most people get better in 7 to 14 days  You may continue to cough for 2 to 3 weeks  Colds are caused by viruses and do not get better with antibiotics  DISCHARGE INSTRUCTIONS:   Return to the emergency department if:   · You have chest pain or trouble breathing  Contact your healthcare provider if:   · You have a fever over 102ºF (39°C)  · Your sore throat gets worse or you see white or yellow spots in your throat  · Your symptoms get worse after 3 to 5 days or your cold is not better in 14 days  · You have a rash anywhere on your skin  · You have large, tender lumps in your neck  · You have thick, green or yellow drainage from your nose  · You cough up thick yellow, green, or bloody mucus  · You have vomiting for more than 24 hours and cannot keep fluids down  · You have a bad earache  · You have questions or concerns about your condition or care  Medicines: You may need any of the following:  · Decongestants  help reduce nasal congestion and help you breathe more easily  If you take decongestant pills, they may make you feel restless or cause problems with your sleep  Do not use decongestant sprays for more than a few days  · Cough suppressants  help reduce coughing   Ask your healthcare provider which type of cough medicine is best for you  · NSAIDs , such as ibuprofen, help decrease swelling, pain, and fever  NSAIDs can cause stomach bleeding or kidney problems in certain people  If you take blood thinner medicine, always ask your healthcare provider if NSAIDs are safe for you  Always read the medicine label and follow directions  · Acetaminophen  decreases pain and fever  It is available without a doctor's order  Ask how much to take and how often to take it  Follow directions  Read the labels of all other medicines you are using to see if they also contain acetaminophen, or ask your doctor or pharmacist  Acetaminophen can cause liver damage if not taken correctly  Do not use more than 4 grams (4,000 milligrams) total of acetaminophen in one day  · Take your medicine as directed  Contact your healthcare provider if you think your medicine is not helping or if you have side effects  Tell him or her if you are allergic to any medicine  Keep a list of the medicines, vitamins, and herbs you take  Include the amounts, and when and why you take them  Bring the list or the pill bottles to follow-up visits  Carry your medicine list with you in case of an emergency  Follow up with your healthcare provider as directed:  Write down your questions so you remember to ask them during your visits  Self-care:   · Rest as much as possible  Slowly start to do more each day  · Drink more liquids as directed  Liquids will help thin and loosen mucus so you can cough it up  Liquids will also help prevent dehydration  Liquids that help prevent dehydration include water, fruit juice, and broth  Do not drink liquids that contain caffeine  Caffeine can increase your risk for dehydration  Ask your healthcare provider how much liquid to drink each day  · Soothe a sore throat  Gargle with warm salt water  This helps your sore throat feel better   Make salt water by dissolving ¼ teaspoon salt in 1 cup warm water  You may also suck on hard candy or throat lozenges  You may use a sore throat spray  · Use a humidifier or vaporizer  Use a cool mist humidifier or a vaporizer to increase air moisture in your home  This may make it easier for you to breathe and help decrease your cough  · Use saline nasal drops as directed  These help relieve congestion  · Apply petroleum-based jelly around the outside of your nostrils  This can decrease irritation from blowing your nose  · Do not smoke  Nicotine and other chemicals in cigarettes and cigars can make your symptoms worse  They can also cause infections such as bronchitis or pneumonia  Ask your healthcare provider for information if you currently smoke and need help to quit  E-cigarettes or smokeless tobacco still contain nicotine  Talk to your healthcare provider before you use these products  Prevent spreading your cold to others:   · Try to stay away from other people during the first 2 to 3 days of your cold when it is more easily spread  · Do not share food or drinks  · Do not share hand towels with household members  · Wash your hands often, especially after you blow your nose  Turn away from other people and cover your mouth and nose with a tissue when you sneeze or cough  © 2017 [facility] Information is for End User's use only and may not be sold, redistributed or otherwise used for commercial purposes  All illustrations and images included in CareNotes® are the copyrighted property of A D A M [employer] Inc  or [employer] Cha  The above information is an  only  It is not intended as medical advice for individual conditions or treatments  Talk to your doctor, nurse or pharmacist before following any medical regimen to see if it is safe and effective for you

## 2019-11-07 NOTE — PROGRESS NOTES
[facility]        NAME: Danette Person is a 59 y o  male  : 1955    MRN: [id number]  DATE: 2019  TIME: 1:46 PM    Assessment and Plan   Acute URI [J06 9]  1  Acute URI           Patient Instructions     Patient Instructions     Recommend over-the-counter Flonase and Mucinex  Rest and fluids  Can use over-the-counter saline rinse  If he develops any headache, dizziness, feeling lightheaded, high fever, shortness of breath, hemoptysis, chest pain, palpitations, or any new or concerning symptoms please return or proceed ER  Recommend following up with PCP in 3-5 days  Upper Respiratory Infection   WHAT YOU NEED TO KNOW:   An upper respiratory infection is also called the common cold  It is an infection that can affect your nose, throat, ears, and sinuses  For healthy people, the common cold is usually not serious and does not need special treatment  Cold symptoms are usually worst for the first 3 to 5 days  Most people get better in 7 to 14 days  You may continue to cough for 2 to 3 weeks  Colds are caused by viruses and do not get better with antibiotics  DISCHARGE INSTRUCTIONS:   Return to the emergency department if:   · You have chest pain or trouble breathing  Contact your healthcare provider if:   · You have a fever over 102ºF (39°C)  · Your sore throat gets worse or you see white or yellow spots in your throat  · Your symptoms get worse after 3 to 5 days or your cold is not better in 14 days  · You have a rash anywhere on your skin  · You have large, tender lumps in your neck  · You have thick, green or yellow drainage from your nose  · You cough up thick yellow, green, or bloody mucus  · You have vomiting for more than 24 hours and cannot keep fluids down  · You have a bad earache  · You have questions or concerns about your condition or care  Medicines:   You may need any of the following:  · Decongestants  help reduce nasal congestion and help you breathe more easily  If you take decongestant pills, they may make you feel restless or cause problems with your sleep  Do not use decongestant sprays for more than a few days  · Cough suppressants  help reduce coughing  Ask your healthcare provider which type of cough medicine is best for you  · NSAIDs , such as ibuprofen, help decrease swelling, pain, and fever  NSAIDs can cause stomach bleeding or kidney problems in certain people  If you take blood thinner medicine, always ask your healthcare provider if NSAIDs are safe for you  Always read the medicine label and follow directions  · Acetaminophen  decreases pain and fever  It is available without a doctor's order  Ask how much to take and how often to take it  Follow directions  Read the labels of all other medicines you are using to see if they also contain acetaminophen, or ask your doctor or pharmacist  Acetaminophen can cause liver damage if not taken correctly  Do not use more than 4 grams (4,000 milligrams) total of acetaminophen in one day  · Take your medicine as directed  Contact your healthcare provider if you think your medicine is not helping or if you have side effects  Tell him or her if you are allergic to any medicine  Keep a list of the medicines, vitamins, and herbs you take  Include the amounts, and when and why you take them  Bring the list or the pill bottles to follow-up visits  Carry your medicine list with you in case of an emergency  Follow up with your healthcare provider as directed:  Write down your questions so you remember to ask them during your visits  Self-care:   · Rest as much as possible  Slowly start to do more each day  · Drink more liquids as directed  Liquids will help thin and loosen mucus so you can cough it up  Liquids will also help prevent dehydration  Liquids that help prevent dehydration include water, fruit juice, and broth  Do not drink liquids that contain caffeine   Caffeine can increase your risk for dehydration  Ask your healthcare provider how much liquid to drink each day  · Soothe a sore throat  Gargle with warm salt water  This helps your sore throat feel better  Make salt water by dissolving ¼ teaspoon salt in 1 cup warm water  You may also suck on hard candy or throat lozenges  You may use a sore throat spray  · Use a humidifier or vaporizer  Use a cool mist humidifier or a vaporizer to increase air moisture in your home  This may make it easier for you to breathe and help decrease your cough  · Use saline nasal drops as directed  These help relieve congestion  · Apply petroleum-based jelly around the outside of your nostrils  This can decrease irritation from blowing your nose  · Do not smoke  Nicotine and other chemicals in cigarettes and cigars can make your symptoms worse  They can also cause infections such as bronchitis or pneumonia  Ask your healthcare provider for information if you currently smoke and need help to quit  E-cigarettes or smokeless tobacco still contain nicotine  Talk to your healthcare provider before you use these products  Prevent spreading your cold to others:   · Try to stay away from other people during the first 2 to 3 days of your cold when it is more easily spread  · Do not share food or drinks  · Do not share hand towels with household members  · Wash your hands often, especially after you blow your nose  Turn away from other people and cover your mouth and nose with a tissue when you sneeze or cough  © 2017 [employer] Information is for End User's use only and may not be sold, redistributed or otherwise used for commercial purposes  All illustrations and images included in CareNotes® are the copyrighted property of [employer]  or [employer]  The above information is an  only  It is not intended as medical advice for individual conditions or treatments   Talk to your doctor, nurse or pharmacist before following any medical regimen to see if it is safe and effective for you  Follow up with PCP in 3-5 days  Proceed to  ER if symptoms worsen  Chief Complaint     Chief Complaint   Patient presents with    Sinusitis     x 5 days         History of Present Illness       Patient is a 66-year-old male who presents with a five-day history of nasal congestion, sinus pressure, and nonproductive cough  Has been trying over-the-counter Advil cold sinus with mild relief  Patient denies any fever, chills, or body aches  Patient denies any headache, dizziness, or feeling lightheaded  Denies any chest pain, hemoptysis, shortness of breath, or palpitations  Review of Systems   Review of Systems   Constitutional: Negative for chills, diaphoresis, fatigue and fever  HENT: Positive for congestion, rhinorrhea and sinus pressure  Negative for ear pain, facial swelling, mouth sores, postnasal drip, sinus pain, sore throat and trouble swallowing  Eyes: Negative for photophobia and visual disturbance  Respiratory: Positive for cough  Negative for chest tightness, shortness of breath, wheezing and stridor  Cardiovascular: Negative for chest pain and palpitations  Gastrointestinal: Negative for abdominal pain, diarrhea, nausea and vomiting  Genitourinary: Negative  Musculoskeletal: Negative for arthralgias, back pain, joint swelling, myalgias, neck pain and neck stiffness  Skin: Negative for rash  Neurological: Negative for dizziness, facial asymmetry, weakness, light-headedness, numbness and headaches           Current Medications       Current Outpatient Medications:     b complex vitamins capsule, Take 1 capsule by mouth daily, Disp: , Rfl:     sertraline (ZOLOFT) 50 mg tablet, Take 1 tablet (50 mg total) by mouth daily, Disp: 90 tablet, Rfl: 3    Bevacizumab (AVASTIN IV), Administer to both eyes q6 weeks , Disp: , Rfl:     Current Allergies     Allergies as of 11/07/2019    (No Known Allergies)            The following portions of the patient's history were reviewed and updated as appropriate: allergies, current medications, past family history, past medical history, past social history, past surgical history and problem list      Past Medical History:   Diagnosis Date    Abnormal eye finding     last assessed 08/18/2015    Anemia     Atrial fibrillation ([address] )     Generalized anxiety disorder     Hyperlipidemia     no longer    Hypertension     no longer       Past Surgical History:   Procedure Laterality Date    COLONOSCOPY N/A 3/10/2017    Procedure: COLONOSCOPY;  Surgeon: Charmaine Richter MD;  [facility];  Service:    [facility] EXTERNAL EAR SURGERY      KIDNEY SURGERY      TIBIA FRACTURE SURGERY         Family History   Problem Relation Age of Onset    No Known Problems Mother     Cancer Paternal Uncle          Medications have been verified  Objective   /78   Pulse 67   Temp 97 9 °F (36 6 °C)   Resp 16   Ht 5' 8" (1 727 m)   Wt 83 5 kg (184 lb)   SpO2 98%   BMI 27 98 kg/m²        Physical Exam     Physical Exam   Constitutional: He is oriented to person, place, and time  He appears well-developed and well-nourished  No distress  HENT:   Head: Normocephalic and atraumatic  Right Ear: Hearing, tympanic membrane, external ear and ear canal normal    Left Ear: Hearing, tympanic membrane, external ear and ear canal normal    Nose: Rhinorrhea present  No mucosal edema  Right sinus exhibits no maxillary sinus tenderness and no frontal sinus tenderness  Left sinus exhibits no maxillary sinus tenderness and no frontal sinus tenderness  Mouth/Throat: Uvula is midline, oropharynx is clear and moist and mucous membranes are normal    Cardiovascular: Normal rate, regular rhythm, S1 normal, normal heart sounds, intact distal pulses and normal pulses  Pulmonary/Chest: Effort normal and breath sounds normal  No accessory muscle usage   No tachypnea  No respiratory distress  Lymphadenopathy:     He has no cervical adenopathy  Neurological: He is alert and oriented to person, place, and time  GCS eye subscore is 4  GCS verbal subscore is 5  GCS motor subscore is 6  Skin: Skin is warm and dry  Capillary refill takes less than 2 seconds  He is not diaphoretic  Vitals reviewed

## 2019-11-14 DIAGNOSIS — F41.9 ANXIETY: ICD-10-CM

## 2020-01-02 PROBLEM — R90.89 ABNORMAL BRAIN MRI: Status: ACTIVE | Noted: 2019-10-23

## 2020-01-07 ENCOUNTER — OFFICE VISIT (OUTPATIENT)
Dept: INTERNAL MEDICINE CLINIC | Facility: CLINIC | Age: 65
End: 2020-01-07
Payer: MEDICARE

## 2020-01-07 VITALS
SYSTOLIC BLOOD PRESSURE: 106 MMHG | DIASTOLIC BLOOD PRESSURE: 70 MMHG | RESPIRATION RATE: 16 BRPM | WEIGHT: 184 LBS | TEMPERATURE: 97.6 F | BODY MASS INDEX: 27.89 KG/M2 | HEIGHT: 68 IN | HEART RATE: 64 BPM

## 2020-01-07 DIAGNOSIS — I10 BENIGN ESSENTIAL HYPERTENSION: Primary | ICD-10-CM

## 2020-01-07 DIAGNOSIS — C64.2 MALIGNANT NEOPLASM OF LEFT KIDNEY, EXCEPT RENAL PELVIS (HCC): ICD-10-CM

## 2020-01-07 DIAGNOSIS — M15.9 GENERALIZED OSTEOARTHRITIS: ICD-10-CM

## 2020-01-07 DIAGNOSIS — Z11.4 SCREENING FOR HIV (HUMAN IMMUNODEFICIENCY VIRUS): ICD-10-CM

## 2020-01-07 DIAGNOSIS — Z12.5 SCREENING FOR PROSTATE CANCER: ICD-10-CM

## 2020-01-07 DIAGNOSIS — I48.0 PAROXYSMAL ATRIAL FIBRILLATION (HCC): ICD-10-CM

## 2020-01-07 DIAGNOSIS — D50.9 IRON DEFICIENCY ANEMIA, UNSPECIFIED IRON DEFICIENCY ANEMIA TYPE: ICD-10-CM

## 2020-01-07 DIAGNOSIS — E66.3 OVERWEIGHT (BMI 25.0-29.9): ICD-10-CM

## 2020-01-07 DIAGNOSIS — F41.1 GENERALIZED ANXIETY DISORDER: ICD-10-CM

## 2020-01-07 DIAGNOSIS — C79.31 METASTASIS TO BRAIN (HCC): ICD-10-CM

## 2020-01-07 DIAGNOSIS — E78.2 MIXED HYPERLIPIDEMIA: ICD-10-CM

## 2020-01-07 DIAGNOSIS — Z00.00 MEDICARE ANNUAL WELLNESS VISIT, SUBSEQUENT: ICD-10-CM

## 2020-01-07 PROCEDURE — 99214 OFFICE O/P EST MOD 30 MIN: CPT | Performed by: INTERNAL MEDICINE

## 2020-01-07 PROCEDURE — 1123F ACP DISCUSS/DSCN MKR DOCD: CPT | Performed by: INTERNAL MEDICINE

## 2020-01-07 PROCEDURE — G0439 PPPS, SUBSEQ VISIT: HCPCS | Performed by: INTERNAL MEDICINE

## 2020-01-07 NOTE — PROGRESS NOTES
BMI Counseling: There is no height or weight on file to calculate BMI  The BMI is above normal  Nutrition recommendations include decreasing portion sizes, increasing intake of lean protein and reducing intake of saturated and trans fat  Exercise recommendations include moderate physical activity 150 minutes/week  No pharmacotherapy was ordered  Assessment/Plan:  Problem List Items Addressed This Visit        Cardiovascular and Mediastinum    Benign essential hypertension - Primary    Relevant Orders    Microalbumin / creatinine urine ratio    Paroxysmal atrial fibrillation (HCC)       Nervous and Auditory    Metastasis to brain [facility])       Musculoskeletal and Integument    Generalized osteoarthritis       Genitourinary    Malignant neoplasm of left kidney, except renal pelvis (HCC)       Other    Generalized anxiety disorder    Relevant Orders    TSH, 3rd generation with Free T4 reflex    Hyperlipidemia    Relevant Orders    TSH, 3rd generation with Free T4 reflex    Iron deficiency anemia    Relevant Orders    Iron    Ferritin    TIBC      Other Visit Diagnoses     Screening for prostate cancer        Relevant Orders    PSA, Total Screen    Medicare annual wellness visit, subsequent        Screening for HIV (human immunodeficiency virus)        Relevant Orders    Human Immunodeficiency Virus 1/2 Antigen / Antibody ( Fourth Generation) with Reflex Testing    Overweight (BMI 25 0-29  9)        BMI 27 0-27 9,adult               Diagnoses and all orders for this visit:    Benign essential hypertension  -     Microalbumin / creatinine urine ratio    Paroxysmal atrial fibrillation (HCC)    Generalized osteoarthritis    Malignant neoplasm of left kidney, except renal pelvis (HCC)    Generalized anxiety disorder  -     TSH, 3rd generation with Free T4 reflex; Future    Mixed hyperlipidemia  -     TSH, 3rd generation with Free T4 reflex;  Future    Iron deficiency anemia, unspecified iron deficiency anemia type  - Iron; Future  -     Ferritin; Future  -     TIBC; Future    Screening for prostate cancer  -     PSA, Total Screen; Future    Medicare annual wellness visit, subsequent    Screening for HIV (human immunodeficiency virus)  -     Human Immunodeficiency Virus 1/2 Antigen / Antibody ( Fourth Generation) with Reflex Testing; Future    Overweight (BMI 25 0-29  9)    BMI 27 0-27 9,adult    Metastasis to brain [facility])        No problem-specific Assessment & Plan notes found for this encounter  A/P: Doing well and will check labs  Discussed BMI and will give  Information  Flu vaccine is up to date  Discussed past MRI of the head and need to modify risks for TIA/CVA  Appreciate neuro's input  Continue current treatment and keep f/u with the specialist  RTC four months for routine  Subjective:      Patient ID: Jace Gimenez is a 59 y o  male  WM RTC for f/u htn, PAF, etc  Doing well and no new issues  Remains active w/o difficulty and no falls  Renal cancer with mets remains in remission  Denies angina, SOB, palpitations, edema, lightheadedness, orthopnea, or PND  EMEBR is good  Vision is no worse and continues to f/u with optho  Due for labs  The following portions of the patient's history were reviewed and updated as appropriate:   He has a past medical history of Abnormal eye finding, Anemia, Atrial fibrillation ([address] ), Generalized anxiety disorder, Hyperlipidemia, and Hypertension  ,  does not have any pertinent problems on file  ,   has a past surgical history that includes External ear surgery; Kidney surgery; Tibia fracture surgery; and Colonoscopy (N/A, 3/10/2017)  ,  family history includes Cancer in his paternal uncle; No Known Problems in his mother  ,   reports that he quit smoking about 21 years ago  His smoking use included cigarettes  He has never used smokeless tobacco  He reports that he does not drink alcohol or use drugs  ,  has No Known Allergies     Current Outpatient Medications   Medication Sig Dispense Refill    b complex vitamins capsule Take 1 capsule by mouth daily      Bevacizumab (AVASTIN IV) Administer to both eyes q6 weeks       sertraline (ZOLOFT) 50 mg tablet TAKE 1 TABLET DAILY 90 tablet 4     No current facility-administered medications for this visit  Review of Systems   Constitutional: Negative for activity change, chills, diaphoresis, fatigue and fever  HENT: Negative  Eyes: Negative for visual disturbance  Respiratory: Negative for cough, chest tightness, shortness of breath and wheezing  Cardiovascular: Negative for chest pain, palpitations and leg swelling  Gastrointestinal: Negative for abdominal pain, constipation, diarrhea, nausea and vomiting  Endocrine: Negative for cold intolerance and heat intolerance  Genitourinary: Negative for difficulty urinating, dysuria and frequency  Musculoskeletal: Negative for arthralgias, gait problem and myalgias  Neurological: Negative for dizziness, seizures, syncope, weakness, light-headedness and headaches  Psychiatric/Behavioral: Negative for confusion and dysphoric mood  The patient is not nervous/anxious  PHQ-9 Depression Screening    PHQ-9:    Frequency of the following problems over the past two weeks:       Little interest or pleasure in doing things:  0 - not at all  Feeling down, depressed, or hopeless:  0 - not at all  PHQ-2 Score:  0        Objective:  Vitals:    01/07/20 1125   BP: 106/70   BP Location: Left arm   Patient Position: Sitting   Cuff Size: Large   Pulse: 64   Resp: 16   Temp: 97 6 °F (36 4 °C)   Weight: 83 5 kg (184 lb)   Height: 5' 8" (1 727 m)     Body mass index is 27 98 kg/m²  Physical Exam   Constitutional: He is oriented to person, place, and time  He appears well-developed and well-nourished  No distress  HENT:   Head: Normocephalic and atraumatic  Mouth/Throat: Oropharynx is clear and moist    Eyes: Pupils are equal, round, and reactive to light   Conjunctivae and EOM are normal    Neck: Neck supple  No JVD present  Cardiovascular: Normal rate, regular rhythm and normal heart sounds  Pulmonary/Chest: Effort normal and breath sounds normal  No respiratory distress  He has no wheezes  He has no rales  Abdominal: Soft  Bowel sounds are normal  He exhibits no distension  There is no tenderness  Musculoskeletal: He exhibits no edema  Neurological: He is alert and oriented to person, place, and time  Psychiatric: He has a normal mood and affect  His behavior is normal  Judgment and thought content normal    Nursing note and vitals reviewed  BMI Counseling: Body mass index is 27 98 kg/m²  The BMI is above normal  Nutrition recommendations include reducing portion sizes, decreasing overall calorie intake, reducing intake of saturated fat and trans fat and reducing intake of cholesterol  Exercise recommendations include moderate aerobic physical activity for 150 minutes/week

## 2020-01-07 NOTE — PATIENT INSTRUCTIONS
Chronic Hypertension   AMBULATORY CARE:   Hypertension  is high blood pressure (BP)  Your BP is the force of your blood moving against the walls of your arteries  Normal BP is less than 120/80  Prehypertension is between 120/80 and 139/89  Hypertension is 140/90 or higher  Hypertension causes your BP to get so high that your heart has to work much harder than normal  This can damage your heart  Chronic hypertension is a long-term condition that you can control with a healthy lifestyle or medicines  A controlled blood pressure helps protect your organs, such as your heart, lungs, brain, and kidneys  Common symptoms include the following:   · Headache     · Blurred vision    · Chest pain     · Dizziness or weakness     · Trouble breathing     · Nosebleeds  Call 911 for any of the following:   · You have discomfort in your chest that feels like squeezing, pressure, fullness, or pain  · You become confused or have difficulty speaking  · You suddenly feel lightheaded or have trouble breathing  · You have pain or discomfort in your back, neck, jaw, stomach, or arm  Seek care immediately if:   · You have a severe headache or vision loss  · You have weakness in an arm or leg  Contact your healthcare provider if:   · You feel faint, dizzy, confused, or drowsy  · You have been taking your BP medicine and your BP is still higher than your healthcare provider says it should be  · You have questions or concerns about your condition or care  Treatment for chronic hypertension  may include medicine to lower your BP and lower your cholesterol level  A low cholesterol level helps prevent heart disease and makes it easier to control your blood pressure  Heart disease can make your blood pressure harder to control  You may also need to make lifestyle changes  Take your medicine exactly as directed    Manage chronic hypertension:  Talk with your healthcare provider about these and other ways to manage hypertension:  · Take your BP at home  Sit and rest for 5 minutes before you take your BP  Extend your arm and support it on a flat surface  Your arm should be at the same level as your heart  Follow the directions that came with your BP monitor  If possible, take at least 2 BP readings each time  Take your BP at least twice a day at the same times each day, such as morning and evening  Keep a record of your BP readings and bring it to your follow-up visits  Ask your healthcare provider what your blood pressure should be  · Limit sodium (salt) as directed  Too much sodium can affect your fluid balance  Check labels to find low-sodium or no-salt-added foods  Some low-sodium foods use potassium salts for flavor  Too much potassium can also cause health problems  Your healthcare provider will tell you how much sodium and potassium are safe for you to have in a day  He or she may recommend that you limit sodium to 2,300 mg a day  · Follow the meal plan recommended by your healthcare provider  A dietitian or your provider can give you more information on low-sodium plans or the DASH (Dietary Approaches to Stop Hypertension) eating plan  The DASH plan is low in sodium, unhealthy fats, and total fat  It is high in potassium, calcium, and fiber  · Exercise to maintain a healthy weight  Exercise at least 30 minutes per day, on most days of the week  This will help decrease your blood pressure  Ask about the best exercise plan for you  · Decrease stress  This may help lower your BP  Learn ways to relax, such as deep breathing or listening to music  · Limit alcohol  Women should limit alcohol to 1 drink a day  Men should limit alcohol to 2 drinks a day  A drink of alcohol is 12 ounces of beer, 5 ounces of wine, or 1½ ounces of liquor  · Do not smoke  Nicotine and other chemicals in cigarettes and cigars can increase your BP and also cause lung damage   Ask your healthcare provider for information if you currently smoke and need help to quit  E-cigarettes or smokeless tobacco still contain nicotine  Talk to your healthcare provider before you use these products  Follow up with your healthcare provider as directed: You will need to return to have your BP checked and to have other lab tests done  Write down your questions so you remember to ask them during your visits  © 2017 [employer] Information is for End User's use only and may not be sold, redistributed or otherwise used for commercial purposes  All illustrations and images included in CareNotes® are the copyrighted property of [employer]  or [employer]  The above information is an  only  It is not intended as medical advice for individual conditions or treatments  Talk to your doctor, nurse or pharmacist before following any medical regimen to see if it is safe and effective for you  Weight Management   AMBULATORY CARE:   Why it is important to manage your weight:  Being overweight increases your risk of health conditions such as heart disease, high blood pressure, type 2 diabetes, and certain types of cancer  It can also increase your risk for osteoarthritis, sleep apnea, and other respiratory problems  Aim for a slow, steady weight loss  Even a small amount of weight loss can lower your risk of health problems  How to lose weight safely:  A safe and healthy way to lose weight is to eat fewer calories and get regular exercise  You can lose up about 1 pound a week by decreasing the number of calories you eat by 500 calories each day  You can decrease calories by eating smaller portion sizes or by cutting out high-calorie foods  Read labels to find out how many calories are in the foods you eat  You can also burn calories with exercise such as walking, swimming, or biking  You will be more likely to keep weight off if you make these changes part of your lifestyle     Healthy meal plan for weight management:  A healthy meal plan includes a variety of foods, contains fewer calories, and helps you stay healthy  A healthy meal plan includes the following:  · Eat whole-grain foods more often  A healthy meal plan should contain fiber  Fiber is the part of grains, fruits, and vegetables that is not broken down by your body  Whole-grain foods are healthy and provide extra fiber in your diet  Some examples of whole-grain foods are whole-wheat breads and pastas, oatmeal, brown rice, and bulgur  · Eat a variety of vegetables every day  Include dark, leafy greens such as spinach, kale, lyndsay greens, and mustard greens  Eat yellow and orange vegetables such as carrots, sweet potatoes, and winter squash  · Eat a variety of fruits every day  Choose fresh or canned fruit (canned in its own juice or light syrup) instead of juice  Fruit juice has very little or no fiber  · Eat low-fat dairy foods  Drink fat-free (skim) milk or 1% milk  Eat fat-free yogurt and low-fat cottage cheese  Try low-fat cheeses such as mozzarella and other reduced-fat cheeses  · Choose meat and other protein foods that are low in fat  Choose beans or other legumes such as split peas or lentils  Choose fish, skinless poultry (chicken or turkey), or lean cuts of red meat (beef or pork)  Before you cook meat or poultry, cut off any visible fat  · Use less fat and oil  Try baking foods instead of frying them  Add less fat, such as margarine, sour cream, regular salad dressing and mayonnaise to foods  Eat fewer high-fat foods  Some examples of high-fat foods include french fries, doughnuts, ice cream, and cakes  · Eat fewer sweets  Limit foods and drinks that are high in sugar  This includes candy, cookies, regular soda, and sweetened drinks  Ways to decrease calories:   · Eat smaller portions  ¨ Use a small plate with smaller servings  ¨ Do not eat second helpings      ¨ When you eat at a restaurant, ask for a box and place half of your meal in the box before you eat  ¨ Share an entrée with someone else  · Replace high-calorie snacks with healthy, low-calorie snacks  ¨ Choose fresh fruit, vegetables, fat-free rice cakes, or air-popped popcorn instead of potato chips, nuts, or chocolate  ¨ Choose water or calorie-free drinks instead of soda or sweetened drinks  · Eat regular meals  Skipping meals can lead to overeating later in the day  Eat a healthy snack in place of a meal if you do not have time to eat a regular meal      · Do not shop for groceries when you are hungry  You may be more likely to make unhealthy food choices  Take a grocery list of healthy foods and shop after you have eaten  Exercise:  Exercise at least 30 minutes per day on most days of the week  Some examples of exercise include walking, biking, dancing, and swimming  You can also fit in more physical activity by taking the stairs instead of the elevator or parking farther away from stores  Ask your healthcare provider about the best exercise plan for you  Other things to consider as you try to lose weight:   · Be aware of situations that may give you the urge to overeat, such as eating while watching television  Find ways to avoid these situations  For example, read a book, go for a walk, or do crafts  · Meet with a weight loss support group or friends who are also trying to lose weight  This may help you stay motivated to continue working on your weight loss goals  © 2017 [employer] Information is for End User's use only and may not be sold, redistributed or otherwise used for commercial purposes  All illustrations and images included in CareNotes® are the copyrighted property of [employer]  or [employer]  The above information is an  only  It is not intended as medical advice for individual conditions or treatments   Talk to your doctor, nurse or pharmacist before following any medical regimen to see if it is safe and effective for you  Low Fat Diet   AMBULATORY CARE:   A low-fat diet  is an eating plan that is low in total fat, unhealthy fat, and cholesterol  You may need to follow a low-fat diet if you have trouble digesting or absorbing fat  You may also need to follow this diet if you have high cholesterol  You can also lower your cholesterol by increasing the amount of fiber in your diet  Soluble fiber is a type of fiber that helps to decrease cholesterol levels  Different types of fat in food:   · Limit unhealthy fats  A diet that is high in cholesterol, saturated fat, and trans fat may cause unhealthy cholesterol levels  Unhealthy cholesterol levels increase your risk of heart disease  ¨ Cholesterol:  Limit intake of cholesterol to less than 200 mg per day  Cholesterol is found in meat, eggs, and dairy  ¨ Saturated fat:  Limit saturated fat to less than 7% of your total daily calories  Ask your dietitian how many calories you need each day  Saturated fat is found in butter, cheese, ice cream, whole milk, and palm oil  Saturated fat is also found in meat, such as beef, pork, chicken skin, and processed meats  Processed meats include sausage, hot dogs, and bologna  ¨ Trans fat:  Avoid trans fat as much as possible  Trans fat is used in fried and baked foods  Foods that say trans fat free on the label may still have up to 0 5 grams of trans fat per serving  · Include healthy fats  Replace foods that are high in saturated and trans fat with foods high in healthy fats  This may help to decrease high cholesterol levels  ¨ Monounsaturated fats: These are found in avocados, nuts, and vegetable oils, such as olive, canola, and sunflower oil  ¨ Polyunsaturated fats: These can be found in vegetable oils, such as soybean or corn oil  Omega-3 fats can help to decrease the risk of heart disease   Omega-3 fats are found in fish, such as salmon, herring, trout, and tuna  Omega-3 fats can also be found in plant foods, such as walnuts, flaxseed, soybeans, and canola oil    Foods to limit or avoid:   · Grains:      ¨ Snacks that are made with partially hydrogenated oils, such as chips, regular crackers, and butter-flavored popcorn    ¨ High-fat baked goods, such as biscuits, croissants, doughnuts, pies, cookies, and pastries    · Dairy:      ¨ Whole milk, 2% milk, and yogurt and ice cream made with whole milk    ¨ Half and half creamer, heavy cream, and whipping cream    ¨ Cheese, cream cheese, and sour cream    · Meats and proteins:      ¨ High-fat cuts of meat (T-bone steak, regular hamburger, and ribs)    ¨ Fried meat, poultry (turkey and chicken), and fish    ¨ Poultry (chicken and turkey) with skin    ¨ Cold cuts (salami or bologna), hot dogs, mejia, and sausage    ¨ Whole eggs and egg yolks    · Vegetables and fruits with added fat:      ¨ Fried vegetables or vegetables in butter or high-fat sauces, such as cream or cheese sauces    ¨ Fried fruit or fruit served with butter or cream    · Fats:      ¨ Butter, stick margarine, and shortening    ¨ Coconut, palm oil, and palm kernel oil  Foods to include:   · Grains:      ¨ Whole-grain breads, cereals, pasta, and brown rice    ¨ Low-fat crackers and pretzels    · Vegetables and fruits:      ¨ Fresh, frozen, or canned vegetables (no salt or low-sodium)    ¨ Fresh, frozen, dried, or canned fruit (canned in light syrup or fruit juice)    ¨ Avocado    · Low-fat dairy products:      ¨ Nonfat (skim) or 1% milk    ¨ Nonfat or low-fat cheese, yogurt, and cottage cheese    · Meats and proteins:      ¨ Chicken or turkey with no skin    ¨ Baked or broiled fish    ¨ Lean beef and pork (loin, round, extra lean hamburger)    ¨ Beans and peas, unsalted nuts, soy products    ¨ Egg whites and substitutes    ¨ Seeds and nuts    · Fats:      ¨ Unsaturated oil, such as canola, olive, peanut, soybean, or sunflower oil    ¨ Soft or liquid margarine and vegetable oil spread    ¨ Low-fat salad dressing  Other ways to decrease fat:   · Read food labels before you buy foods  Choose foods that have less than 30% of calories from fat  Choose low-fat or fat-free dairy products  Remember that fat free does not mean calorie free  These foods still contain calories, and too many calories can lead to weight gain  · Trim fat from meat and avoid fried food  Trim all visible fat from meat before you cook it  Remove the skin from poultry  Do not abel meat, fish, or poultry  Bake, roast, boil, or broil these foods instead  Avoid fried foods  Eat a baked potato instead of [place] fries  Steam vegetables instead of sautéing them in butter  · Add less fat to foods  Use imitation mejia bits on salads and baked potatoes instead of regular mejia bits  Use fat-free or low-fat salad dressings instead of regular dressings  Use low-fat or nonfat butter-flavored topping instead of regular butter or margarine on popcorn and other foods  Ways to decrease fat in recipes:  Replace high-fat ingredients with low-fat or nonfat ones  This may cause baked goods to be drier than usual  You may need to use nonfat cooking spray on pans to prevent food from sticking  You also may need to change the amount of other ingredients, such as water, in the recipe  Try the following:  · Use low-fat or light margarine instead of regular margarine or shortening  · Use lean ground turkey breast or chicken, or lean ground beef (less than 5% fat) instead of hamburger  · Add 1 teaspoon of canola oil to 8 ounces of skim milk instead of using cream or half and half  · Use grated zucchini, carrots, or apples in breads instead of coconut  · Use blenderized, low-fat cottage cheese, plain tofu, or low-fat ricotta cheese instead of cream cheese  · Use 1 egg white and 1 teaspoon of canola oil, or use ¼ cup (2 ounces) of fat-free egg substitute instead of a whole egg       · Replace half of the oil that is called for in a recipe with applesauce when you bake  Use 3 tablespoons of cocoa powder and 1 tablespoon of canola oil instead of a square of baking chocolate  How to increase fiber:  Eat enough high-fiber foods to get 20 to 30 grams of fiber every day  Slowly increase your fiber intake to avoid stomach cramps, gas, and other problems  · Eat 3 ounces of whole-grain foods each day  An ounce is about 1 slice of bread  Eat whole-grain breads, such as whole-wheat bread  Whole wheat, whole-wheat flour, or other whole grains should be listed as the first ingredient on the food label  Replace white flour with whole-grain flour or use half of each in recipes  Whole-grain flour is heavier than white flour, so you may have to add more yeast or baking powder  · Eat a high-fiber cereal for breakfast   Oatmeal is a good source of soluble fiber  Look for cereals that have bran or fiber in the name  Choose whole-grain products, such as brown rice, barley, and whole-wheat pasta  · Eat more beans, peas, and lentils  For example, add beans to soups or salads  Eat at least 5 cups of fruits and vegetables each day  Eat fruits and vegetables with the peel because the peel is high in fiber  © 2017 [employer] Information is for End User's use only and may not be sold, redistributed or otherwise used for commercial purposes  All illustrations and images included in CareNotes® are the copyrighted property of [employer]  or [employer]  The above information is an  only  It is not intended as medical advice for individual conditions or treatments  Talk to your doctor, nurse or pharmacist before following any medical regimen to see if it is safe and effective for you  Heart Healthy Diet   AMBULATORY CARE:   A heart healthy diet  is an eating plan low in total fat, unhealthy fats, and sodium (salt)   A heart healthy diet helps decrease your risk for heart disease and stroke  Limit the amount of fat you eat to 25% to 35% of your total daily calories  Limit sodium to less than 2,300 mg each day  Healthy fats:  Healthy fats can help improve cholesterol levels  The risk for heart disease is decreased when cholesterol levels are normal  Choose healthy fats, such as the following:  · Unsaturated fat  is found in foods such as soybean, canola, olive, corn, and safflower oils  It is also found in soft tub margarine that is made with liquid vegetable oil  · Omega-3 fat  is found in certain fish, such as salmon, tuna, and trout, and in walnuts and flaxseed  Unhealthy fats:  Unhealthy fats can cause unhealthy cholesterol levels in your blood and increase your risk of heart disease  Limit unhealthy fats, such as the following:  · Cholesterol  is found in animal foods, such as eggs and lobster, and in dairy products made from whole milk  Limit cholesterol to less than 300 milligrams (mg) each day  You may need to limit cholesterol to 200 mg each day if you have heart disease  · Saturated fat  is found in meats, such as mejia and hamburger  It is also found in chicken or turkey skin, whole milk, and butter  Limit saturated fat to less than 7% of your total daily calories  Limit saturated fat to less than 6% if you have heart disease or are at increased risk for it  · Trans fat  is found in packaged foods, such as potato chips and cookies  It is also in hard margarine, some fried foods, and shortening  Avoid trans fats as much as possible    Heart healthy foods and drinks to include:  Ask your dietitian or healthcare provider how many servings to have from each of the following food groups:  · Grains:      ¨ Whole-wheat breads, cereals, and pastas, and brown rice    ¨ Low-fat, low-sodium crackers and chips    · Vegetables:      ¨ Broccoli, green beans, green peas, and spinach    ¨ Collards, kale, and lima beans    ¨ Carrots, sweet potatoes, tomatoes, and peppers    ¨ Canned vegetables with no salt added    · Fruits:      ¨ Bananas, peaches, pears, and pineapple    ¨ Grapes, raisins, and dates    ¨ Oranges, tangerines, grapefruit, orange juice, and grapefruit juice    ¨ Apricots, mangoes, melons, and papaya    ¨ Raspberries and strawberries    ¨ Canned fruit with no added sugar    · Low-fat dairy products:      ¨ Nonfat (skim) milk, 1% milk, and low-fat almond, cashew, or soy milks fortified with calcium    ¨ Low-fat cheese, regular or frozen yogurt, and cottage cheese    · Meats and proteins , such as lean cuts of beef and pork (loin, leg, round), skinless chicken and turkey, legumes, soy products, egg whites, and nuts  Foods and drinks to limit or avoid:  Ask your dietitian or healthcare provider about these and other foods that are high in unhealthy fat, sodium, and sugar:  · Snack or packaged foods , such as frozen dinners, cookies, macaroni and cheese, and cereals with more than 300 mg of sodium per serving    · Canned or dry mixes  for cakes, soups, sauces, or gravies    · Vegetables with added sodium , such as instant potatoes, vegetables with added sauces, or regular canned vegetables    · Other foods high in sodium , such as ketchup, barbecue sauce, salad dressing, pickles, olives, soy sauce, and miso    · High-fat dairy foods  such as whole or 2% milk, cream cheese, or sour cream, and cheeses     · High-fat protein foods  such as high-fat cuts of beef (T-bone steaks, ribs), chicken or turkey with skin, and organ meats, such as liver    · Cured or smoked meats , such as hot dogs, mejia, and sausage    · Unhealthy fats and oils , such as butter, stick margarine, shortening, and cooking oils such as coconut or palm oil    · Food and drinks high in sugar , such as soft drinks (soda), sports drinks, sweetened tea, candy, cake, cookies, pies, and doughnuts  Other diet guidelines to follow:   · Eat more foods containing omega-3 fats    Eat fish high in omega-3 fats at least 2 times a week      · Limit alcohol  Too much alcohol can damage your heart and raise your blood pressure  Women should limit alcohol to 1 drink a day  Men should limit alcohol to 2 drinks a day  A drink of alcohol is 12 ounces of beer, 5 ounces of wine, or 1½ ounces of liquor  · Choose low-sodium foods  High-sodium foods can lead to high blood pressure  Add little or no salt to food you prepare  Use herbs and spices in place of salt  · Eat more fiber  to help lower cholesterol levels  Eat at least 5 servings of fruits and vegetables each day  Eat 3 ounces of whole-grain foods each day  Legumes (beans) are also a good source of fiber  Lifestyle guidelines:   · Do not smoke  Nicotine and other chemicals in cigarettes and cigars can cause lung and heart damage  Ask your healthcare provider for information if you currently smoke and need help to quit  E-cigarettes or smokeless tobacco still contain nicotine  Talk to your healthcare provider before you use these products  · Exercise regularly  to help you maintain a healthy weight and improve your blood pressure and cholesterol levels  Ask your healthcare provider about the best exercise plan for you  Do not start an exercise program without asking your healthcare provider  Follow up with your healthcare provider as directed:  Write down your questions so you remember to ask them during your visits  © 2017 [employer]  Information is for End User's use only and may not be sold, redistributed or otherwise used for commercial purposes  All illustrations and images included in CareNotes® are the copyrighted property of [employer]  or [employer]  The above information is an  only  It is not intended as medical advice for individual conditions or treatments  Talk to your doctor, nurse or pharmacist before following any medical regimen to see if it is safe and effective for you      Medicare Preventive Visit Patient Instructions  Thank you for completing your Welcome to Medicare Visit or Medicare Annual Wellness Visit today  Your next wellness visit will be due in one year (1/7/2021)  The screening/preventive services that you may require over the next 5-10 years are detailed below  Some tests may not apply to you based off risk factors and/or age  Screening tests ordered at today's visit but not completed yet may show as past due  Also, please note that scanned in results may not display below  Preventive Screenings:  Service Recommendations Previous Testing/Comments   Colorectal Cancer Screening  · Colonoscopy    · Fecal Occult Blood Test (FOBT)/Fecal Immunochemical Test (FIT)  · Fecal DNA/Cologuard Test  · Flexible Sigmoidoscopy Age: 54-65 years old   Colonoscopy: every 10 years (May be performed more frequently if at higher risk)  OR  FOBT/FIT: every 1 year  OR  Cologuard: every 3 years  OR  Sigmoidoscopy: every 5 years  Screening may be recommended earlier than age 48 if at higher risk for colorectal cancer  Also, an individualized decision between you and your healthcare provider will decide whether screening between the ages of 74-80 would be appropriate  Colonoscopy: 03/10/2017  FOBT/FIT: Not on file  Cologuard: Not on file  Sigmoidoscopy: Not on file    Screening Current     Prostate Cancer Screening Individualized decision between patient and health care provider in men between ages of 53-78   Medicare will cover every 12 months beginning on the day after your 50th birthday PSA: 0 9 ng/mL          Hepatitis C Screening Once for adults born between 1945 and 1965  More frequently in patients at high risk for Hepatitis C Hep C Antibody: 09/26/2018    Screening Current   Diabetes Screening 1-2 times per year if you're at risk for diabetes or have pre-diabetes Fasting glucose: 84 mg/dL   A1C: 5 2 %    Screening Current   Cholesterol Screening Once every 5 years if you don't have a lipid disorder   May order more often based on risk factors  Lipid panel: 09/26/2018    Screening Not Indicated  History Lipid Disorder      Other Preventive Screenings Covered by Medicare:  1  Abdominal Aortic Aneurysm (AAA) Screening: covered once if your at risk  You're considered to be at risk if you have a family history of AAA or a male between the age of 73-68 who smoking at least 100 cigarettes in your lifetime  2  Lung Cancer Screening: covers low dose CT scan once per year if you meet all of the following conditions: (1) Age 50-69; (2) No signs or symptoms of lung cancer; (3) Current smoker or have quit smoking within the last 15 years; (4) You have a tobacco smoking history of at least 30 pack years (packs per day x number of years you smoked); (5) You get a written order from a healthcare provider  3  Glaucoma Screening: covered annually if you're considered high risk: (1) You have diabetes OR (2) Family history of glaucoma OR (3)  aged 48 and older OR (3)  American aged 72 and older  3  Osteoporosis Screening: covered every 2 years if you meet one of the following conditions: (1) Have a vertebral abnormality; (2) On glucocorticoid therapy for more than 3 months; (3) Have primary hyperparathyroidism; (4) On osteoporosis medications and need to assess response to drug therapy  5  HIV Screening: covered annually if you're between the age of 12-76  Also covered annually if you are younger than 13 and older than 72 with risk factors for HIV infection  For pregnant patients, it is covered up to 3 times per pregnancy      Immunizations:  Immunization Recommendations   Influenza Vaccine Annual influenza vaccination during flu season is recommended for all persons aged >= 6 months who do not have contraindications   Pneumococcal Vaccine (Prevnar and Pneumovax)  * Prevnar = PCV13  * Pneumovax = PPSV23 Adults 25-60 years old: 1-3 doses may be recommended based on certain risk factors  Adults 72 years old: Prevnar (PCV13) vaccine recommended followed by Pneumovax (PPSV23) vaccine  If already received PPSV23 since turning 65, then PCV13 recommended at least one year after PPSV23 dose  Hepatitis B Vaccine 3 dose series if at intermediate or high risk (ex: diabetes, end stage renal disease, liver disease)   Tetanus (Td) Vaccine - COST NOT COVERED BY MEDICARE PART B Following completion of primary series, a booster dose should be given every 10 years to maintain immunity against tetanus  Td may also be given as tetanus wound prophylaxis  Tdap Vaccine - COST NOT COVERED BY MEDICARE PART B Recommended at least once for all adults  For pregnant patients, recommended with each pregnancy  Shingles Vaccine (Shingrix) - COST NOT COVERED BY MEDICARE PART B  2 shot series recommended in those aged 48 and above     Health Maintenance Due:      Topic Date Due    CRC Screening: Colonoscopy  03/10/2022    Hepatitis C Screening  Completed     Immunizations Due:      Topic Date Due    Pneumococcal Vaccine: Pediatrics (0 to 5 Years) and At-Risk Patients (6 to 59 Years) (1 of 3 - PCV13) 10/14/1961     Advance Directives   What are advance directives? Advance directives are legal documents that state your wishes and plans for medical care  These plans are made ahead of time in case you lose your ability to make decisions for yourself  Advance directives can apply to any medical decision, such as the treatments you want, and if you want to donate organs  What are the types of advance directives? There are many types of advance directives, and each state has rules about how to use them  You may choose a combination of any of the following:  · Living will: This is a written record of the treatment you want  You can also choose which treatments you do not want, which to limit, and which to stop at a certain time  This includes surgery, medicine, IV fluid, and tube feedings  · Durable power of  for healthcare [facility]):   This is a written record that states who you want to make healthcare choices for you when you are unable to make them for yourself  This person, called a proxy, is usually a family member or a friend  You may choose more than 1 proxy  · Do not resuscitate (DNR) order:  A DNR order is used in case your heart stops beating or you stop breathing  It is a request not to have certain forms of treatment, such as CPR  A DNR order may be included in other types of advance directives  · Medical directive: This covers the care that you want if you are in a coma, near death, or unable to make decisions for yourself  You can list the treatments you want for each condition  Treatment may include pain medicine, surgery, blood transfusions, dialysis, IV or tube feedings, and a ventilator (breathing machine)  · Values history: This document has questions about your views, beliefs, and how you feel and think about life  This information can help others choose the care that you would choose  Why are advance directives important? An advance directive helps you control your care  Although spoken wishes may be used, it is better to have your wishes written down  Spoken wishes can be misunderstood, or not followed  Treatments may be given even if you do not want them  An advance directive may make it easier for your family to make difficult choices about your care  Weight Management   Why it is important to manage your weight:  Being overweight increases your risk of health conditions such as heart disease, high blood pressure, type 2 diabetes, and certain types of cancer  It can also increase your risk for osteoarthritis, sleep apnea, and other respiratory problems  Aim for a slow, steady weight loss  Even a small amount of weight loss can lower your risk of health problems  How to lose weight safely:  A safe and healthy way to lose weight is to eat fewer calories and get regular exercise   You can lose up about 1 pound a week by decreasing the number of calories you eat by 500 calories each day  Healthy meal plan for weight management:  A healthy meal plan includes a variety of foods, contains fewer calories, and helps you stay healthy  A healthy meal plan includes the following:  · Eat whole-grain foods more often  A healthy meal plan should contain fiber  Fiber is the part of grains, fruits, and vegetables that is not broken down by your body  Whole-grain foods are healthy and provide extra fiber in your diet  Some examples of whole-grain foods are whole-wheat breads and pastas, oatmeal, brown rice, and bulgur  · Eat a variety of vegetables every day  Include dark, leafy greens such as spinach, kale, lyndsay greens, and mustard greens  Eat yellow and orange vegetables such as carrots, sweet potatoes, and winter squash  · Eat a variety of fruits every day  Choose fresh or canned fruit (canned in its own juice or light syrup) instead of juice  Fruit juice has very little or no fiber  · Eat low-fat dairy foods  Drink fat-free (skim) milk or 1% milk  Eat fat-free yogurt and low-fat cottage cheese  Try low-fat cheeses such as mozzarella and other reduced-fat cheeses  · Choose meat and other protein foods that are low in fat  Choose beans or other legumes such as split peas or lentils  Choose fish, skinless poultry (chicken or turkey), or lean cuts of red meat (beef or pork)  Before you cook meat or poultry, cut off any visible fat  · Use less fat and oil  Try baking foods instead of frying them  Add less fat, such as margarine, sour cream, regular salad dressing and mayonnaise to foods  Eat fewer high-fat foods  Some examples of high-fat foods include french fries, doughnuts, ice cream, and cakes  · Eat fewer sweets  Limit foods and drinks that are high in sugar  This includes candy, cookies, regular soda, and sweetened drinks  Exercise:  Exercise at least 30 minutes per day on most days of the week   Some examples of exercise include walking, biking, dancing, and swimming  You can also fit in more physical activity by taking the stairs instead of the elevator or parking farther away from stores  Ask your healthcare provider about the best exercise plan for you  © Copyright [employer] 2018 Information is for End User's use only and may not be sold, redistributed or otherwise used for commercial purposes  All illustrations and images included in CareNotes® are the copyrighted property of [employer]  or [facility] Preventive Visit Patient Instructions  Thank you for completing your Welcome to Medicare Visit or Medicare Annual Wellness Visit today  Your next wellness visit will be due in one year (1/7/2021)  The screening/preventive services that you may require over the next 5-10 years are detailed below  Some tests may not apply to you based off risk factors and/or age  Screening tests ordered at today's visit but not completed yet may show as past due  Also, please note that scanned in results may not display below  Preventive Screenings:  Service Recommendations Previous Testing/Comments   Colorectal Cancer Screening  · Colonoscopy    · Fecal Occult Blood Test (FOBT)/Fecal Immunochemical Test (FIT)  · Fecal DNA/Cologuard Test  · Flexible Sigmoidoscopy Age: 54-65 years old   Colonoscopy: every 10 years (May be performed more frequently if at higher risk)  OR  FOBT/FIT: every 1 year  OR  Cologuard: every 3 years  OR  Sigmoidoscopy: every 5 years  Screening may be recommended earlier than age 48 if at higher risk for colorectal cancer  Also, an individualized decision between you and your healthcare provider will decide whether screening between the ages of 74-80 would be appropriate   Colonoscopy: 03/10/2017  FOBT/FIT: Not on file  Cologuard: Not on file  Sigmoidoscopy: Not on file    Screening Current     Prostate Cancer Screening Individualized decision between patient and health care provider in men between ages of 55-69   Medicare will cover every 12 months beginning on the day after your 50th birthday PSA: 0 9 ng/mL          Hepatitis C Screening Once for adults born between 1945 and 1965  More frequently in patients at high risk for Hepatitis C Hep C Antibody: 09/26/2018    Screening Current   Diabetes Screening 1-2 times per year if you're at risk for diabetes or have pre-diabetes Fasting glucose: 84 mg/dL   A1C: 5 2 %    Screening Current   Cholesterol Screening Once every 5 years if you don't have a lipid disorder  May order more often based on risk factors  Lipid panel: 09/26/2018    Screening Not Indicated  History Lipid Disorder      Other Preventive Screenings Covered by Medicare:  6  Abdominal Aortic Aneurysm (AAA) Screening: covered once if your at risk  You're considered to be at risk if you have a family history of AAA or a male between the age of 73-68 who smoking at least 100 cigarettes in your lifetime  7  Lung Cancer Screening: covers low dose CT scan once per year if you meet all of the following conditions: (1) Age 50-69; (2) No signs or symptoms of lung cancer; (3) Current smoker or have quit smoking within the last 15 years; (4) You have a tobacco smoking history of at least 30 pack years (packs per day x number of years you smoked); (5) You get a written order from a healthcare provider  8  Glaucoma Screening: covered annually if you're considered high risk: (1) You have diabetes OR (2) Family history of glaucoma OR (3)  aged 48 and older OR (3)  American aged 72 and older  5  Osteoporosis Screening: covered every 2 years if you meet one of the following conditions: (1) Have a vertebral abnormality; (2) On glucocorticoid therapy for more than 3 months; (3) Have primary hyperparathyroidism; (4) On osteoporosis medications and need to assess response to drug therapy  10  HIV Screening: covered annually if you're between the age of 12-76   Also covered annually if you are younger than 13 and older than 72 with risk factors for HIV infection  For pregnant patients, it is covered up to 3 times per pregnancy  Immunizations:  Immunization Recommendations   Influenza Vaccine Annual influenza vaccination during flu season is recommended for all persons aged >= 6 months who do not have contraindications   Pneumococcal Vaccine (Prevnar and Pneumovax)  * Prevnar = PCV13  * Pneumovax = PPSV23 Adults 25-60 years old: 1-3 doses may be recommended based on certain risk factors  Adults 72 years old: Prevnar (PCV13) vaccine recommended followed by Pneumovax (PPSV23) vaccine  If already received PPSV23 since turning 65, then PCV13 recommended at least one year after PPSV23 dose  Hepatitis B Vaccine 3 dose series if at intermediate or high risk (ex: diabetes, end stage renal disease, liver disease)   Tetanus (Td) Vaccine - COST NOT COVERED BY MEDICARE PART B Following completion of primary series, a booster dose should be given every 10 years to maintain immunity against tetanus  Td may also be given as tetanus wound prophylaxis  Tdap Vaccine - COST NOT COVERED BY MEDICARE PART B Recommended at least once for all adults  For pregnant patients, recommended with each pregnancy  Shingles Vaccine (Shingrix) - COST NOT COVERED BY MEDICARE PART B  2 shot series recommended in those aged 48 and above     Health Maintenance Due:      Topic Date Due    CRC Screening: Colonoscopy  03/10/2022    Hepatitis C Screening  Completed     Immunizations Due:      Topic Date Due    Pneumococcal Vaccine: Pediatrics (0 to 5 Years) and At-Risk Patients (6 to 59 Years) (1 of 3 - PCV13) 10/14/1961     Advance Directives   What are advance directives? Advance directives are legal documents that state your wishes and plans for medical care  These plans are made ahead of time in case you lose your ability to make decisions for yourself   Advance directives can apply to any medical decision, such as the treatments manage your weight:  Being overweight increases your risk of health conditions such as heart disease, high blood pressure, type 2 diabetes, and certain types of cancer  It can also increase your risk for osteoarthritis, sleep apnea, and other respiratory problems  Aim for a slow, steady weight loss  Even a small amount of weight loss can lower your risk of health problems  How to lose weight safely:  A safe and healthy way to lose weight is to eat fewer calories and get regular exercise  You can lose up about 1 pound a week by decreasing the number of calories you eat by 500 calories each day  Healthy meal plan for weight management:  A healthy meal plan includes a variety of foods, contains fewer calories, and helps you stay healthy  A healthy meal plan includes the following:  · Eat whole-grain foods more often  A healthy meal plan should contain fiber  Fiber is the part of grains, fruits, and vegetables that is not broken down by your body  Whole-grain foods are healthy and provide extra fiber in your diet  Some examples of whole-grain foods are whole-wheat breads and pastas, oatmeal, brown rice, and bulgur  · Eat a variety of vegetables every day  Include dark, leafy greens such as spinach, kale, lyndsay greens, and mustard greens  Eat yellow and orange vegetables such as carrots, sweet potatoes, and winter squash  · Eat a variety of fruits every day  Choose fresh or canned fruit (canned in its own juice or light syrup) instead of juice  Fruit juice has very little or no fiber  · Eat low-fat dairy foods  Drink fat-free (skim) milk or 1% milk  Eat fat-free yogurt and low-fat cottage cheese  Try low-fat cheeses such as mozzarella and other reduced-fat cheeses  · Choose meat and other protein foods that are low in fat  Choose beans or other legumes such as split peas or lentils  Choose fish, skinless poultry (chicken or turkey), or lean cuts of red meat (beef or pork)   Before you cook meat or poultry, cut off any visible fat  · Use less fat and oil  Try baking foods instead of frying them  Add less fat, such as margarine, sour cream, regular salad dressing and mayonnaise to foods  Eat fewer high-fat foods  Some examples of high-fat foods include french fries, doughnuts, ice cream, and cakes  · Eat fewer sweets  Limit foods and drinks that are high in sugar  This includes candy, cookies, regular soda, and sweetened drinks  Exercise:  Exercise at least 30 minutes per day on most days of the week  Some examples of exercise include walking, biking, dancing, and swimming  You can also fit in more physical activity by taking the stairs instead of the elevator or parking farther away from stores  Ask your healthcare provider about the best exercise plan for you  © Copyright [employer] 2018 Information is for End User's use only and may not be sold, redistributed or otherwise used for commercial purposes   All illustrations and images included in CareNotes® are the copyrighted property of [employer]  or [address] [place] [address]

## 2020-01-07 NOTE — PROGRESS NOTES
Assessment and Plan:     Problem List Items Addressed This Visit        Cardiovascular and Mediastinum    Benign essential hypertension - Primary    Relevant Orders    Microalbumin / creatinine urine ratio    Paroxysmal atrial fibrillation (HCC)       Nervous and Auditory    Metastasis to brain [facility])       Musculoskeletal and Integument    Generalized osteoarthritis       Genitourinary    Malignant neoplasm of left kidney, except renal pelvis (HCC)       Other    Generalized anxiety disorder    Relevant Orders    TSH, 3rd generation with Free T4 reflex    Hyperlipidemia    Relevant Orders    TSH, 3rd generation with Free T4 reflex    Iron deficiency anemia    Relevant Orders    Iron    Ferritin    TIBC      Other Visit Diagnoses     Screening for prostate cancer        Relevant Orders    PSA, Total Screen    Medicare annual wellness visit, subsequent        Screening for HIV (human immunodeficiency virus)        Relevant Orders    Human Immunodeficiency Virus 1/2 Antigen / Antibody ( Fourth Generation) with Reflex Testing    Overweight (BMI 25 0-29  9)        BMI 27 0-27 9,adult               Preventive health issues were discussed with patient, and age appropriate screening tests were ordered as noted in patient's After Visit Summary  Personalized health advice and appropriate referrals for health education or preventive services given if needed, as noted in patient's After Visit Summary       History of Present Illness:     Patient presents for Medicare Annual Wellness visit    Patient Care Team:  Tena Day DO as PCP - General (Internal Medicine)  MD Juancarlos Moura MD as Endoscopist     Problem List:     Patient Active Problem List   Diagnosis    Benign essential hypertension    Bone metastasis ([address] )    Degeneration of intervertebral disc    Generalized anxiety disorder    Generalized osteoarthritis    Hyperlipidemia    Iron deficiency anemia    Macular edema    Malignant neoplasm of left kidney, except renal pelvis (HCC)    [address] [facility] [address] [address]    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 ([facility] [address] )    Metastatic cancer to bone (HCC)    Paroxysmal atrial fibrillation (HCC)    Personal history of radiation therapy    Pulmonary nodules    Abnormal brain MRI      Past Medical and Surgical History:     Past Medical History:   Diagnosis Date    Abnormal eye finding     last assessed 2015    Anemia     Atrial fibrillation (HCC)     Generalized anxiety disorder     Hyperlipidemia     no longer    Hypertension     no longer     Past Surgical History:   Procedure Laterality Date    COLONOSCOPY N/A 3/10/2017    Procedure: COLONOSCOPY;  Surgeon: Sally Severe, MD;  Location: [facility];  Service:    [facility] [place] EXTERNAL EAR SURGERY      KIDNEY SURGERY      TIBIA FRACTURE SURGERY        Family History:     Family History   Problem Relation Age of Onset    No Known Problems Mother     Cancer Paternal Uncle       Social History:     Social History     Socioeconomic History    Marital status: /Civil Union     Spouse name: None    Number of children: None    Years of education: None    Highest education level: None   Occupational History    Occupation: Retired     Employer: OTHER   Social Needs    Financial resource strain: None    Food insecurity:     Worry: None     Inability: None    Transportation needs:     Medical: None     Non-medical: None   Tobacco Use    Smoking status: Former Smoker     Types: Cigarettes     Last attempt to quit:      Years since quittin 0    Smokeless tobacco: Never Used    Tobacco comment: 2 cigarettes   Substance and Sexual Activity    Alcohol use: No    Drug use: Never    Sexual activity: None   Lifestyle    Physical activity:     Days per week: None     Minutes per session: None    Stress: None   Relationships    Social connections:     Talks on phone: None     Gets together: None     Attends Mosque service: None     Active member of club or organization: None     Attends meetings of clubs or organizations: None     Relationship status: None    Intimate partner violence:     Fear of current or ex partner: None     Emotionally abused: None     Physically abused: None     Forced sexual activity: None   Other Topics Concern    None   Social History Narrative    Caffeine use       Medications and Allergies:     Current Outpatient Medications   Medication Sig Dispense Refill    b complex vitamins capsule Take 1 capsule by mouth daily      Bevacizumab (AVASTIN IV) Administer to both eyes q6 weeks       sertraline (ZOLOFT) 50 mg tablet TAKE 1 TABLET DAILY 90 tablet 4     No current facility-administered medications for this visit  No Known Allergies   Immunizations:     Immunization History   Administered Date(s) Administered    INFLUENZA 09/29/2014, 10/15/2015, 10/03/2016, 10/02/2017    Influenza Quadrivalent Preservative Free 3 years and older IM 10/15/2015, 10/03/2016    Influenza Quadrivalent, 6-35 Months IM 10/02/2017    Influenza TIV (IM) 10/01/2013, 09/29/2014    Influenza, recombinant, quadrivalent,injectable, preservative free 09/26/2018, 09/16/2019    Td (adult), adsorbed 01/01/2004    Tdap 08/18/2015      Health Maintenance:         Topic Date Due    CRC Screening: Colonoscopy  03/10/2022    Hepatitis C Screening  Completed         Topic Date Due    Pneumococcal Vaccine: Pediatrics (0 to 5 Years) and At-Risk Patients (6 to 59 Years) (1 of 3 - PCV13) 10/14/1961      Medicare Health Risk Assessment:     /70 (BP Location: Left arm, Patient Position: Sitting, Cuff Size: Large)   Pulse 64   Temp 97 6 °F (36 4 °C)   Resp 16   Ht 5' 8" (1 727 m)   Wt 83 5 kg (184 lb)   BMI 27 98 kg/m²      Kirill Moctezuma is here for his Subsequent Wellness visit  Last Medicare Wellness visit information reviewed, patient interviewed and updates made to the record today  Health Risk Assessment:   Patient rates overall health as excellent   Patient feels that their physical health rating is same  Hearing was rated as same  Patient feels that their emotional and mental health rating is same  Pain experienced in the last 7 days has been none  Patient states that he has experienced no weight loss or gain in last 6 months  Depression Screening:   PHQ-2 Score: 0      Fall Risk Screening: In the past year, patient has experienced: history of falling in past year    Number of falls: 1    Home Safety:  Patient does not have trouble with stairs inside or outside of their home  Patient has working smoke alarms and has no working carbon monoxide detector  Home safety hazards include: none  Nutrition:   Current diet is Other (please comment)  HEALTHY DIET    Medications:   Patient is currently taking over-the-counter supplements  OTC medications include: see medication list  Patient is able to manage medications  Activities of Daily Living (ADLs)/Instrumental Activities of Daily Living (IADLs):   Walk and transfer into and out of bed and chair?: Yes  Dress and groom yourself?: Yes    Bathe or shower yourself?: Yes    Feed yourself? Yes  Do your laundry/housekeeping?: Yes  Manage your money, pay your bills and track your expenses?: Yes  Make your own meals?: Yes    Do your own shopping?: Yes    Previous Hospitalizations:   Any hospitalizations or ED visits within the last 12 months?: No      Advance Care Planning:   Living will: Yes    Durable POA for healthcare:  Yes    Advanced directive: Yes    Advanced directive counseling given: Yes    Five wishes given: No    Patient declined ACP directive: No    End of Life Decisions reviewed with patient: Yes    Provider agrees with end of life decisions: Yes      Cognitive Screening:   Provider or family/friend/caregiver concerned regarding cognition?: No    PREVENTIVE SCREENINGS      Cardiovascular Screening:    General: History Lipid Disorder and Screening Current    Due for: Lipid Panel      Diabetes Screening: General: Screening Current      Colorectal Cancer Screening:     General: Screening Current      Prostate Cancer Screening:    General: Screening Current    Due for: PSA      Osteoporosis Screening:    General: Patient Declines and Risks and Benefits Discussed      Abdominal Aortic Aneurysm (AAA) Screening:    Risk factors include: tobacco use        General: Screening Not Indicated      Lung Cancer Screening:     General: Screening Not Indicated and History Lung Cancer      Hepatitis C Screening:    General: Screening Current    Other Counseling Topics:   Car/seat belt/driving safety and calcium and vitamin D intake and regular weightbearing exercise  A/P: Doing well and no falls reported  Denies depression and feels safe at home  Diverse diet  No problems operating a MV and uses seat belts  Has a living will and POA  No DME or referrals needed today  RTC one year for medicare wellness         Silvano Suarez DO

## 2020-02-07 ENCOUNTER — APPOINTMENT (OUTPATIENT)
Dept: LAB | Facility: CLINIC | Age: 65
End: 2020-02-07
Payer: MEDICARE

## 2020-02-07 DIAGNOSIS — E78.2 MIXED HYPERLIPIDEMIA: ICD-10-CM

## 2020-02-07 DIAGNOSIS — Z12.5 SCREENING FOR PROSTATE CANCER: ICD-10-CM

## 2020-02-07 DIAGNOSIS — D50.9 IRON DEFICIENCY ANEMIA, UNSPECIFIED IRON DEFICIENCY ANEMIA TYPE: ICD-10-CM

## 2020-02-07 DIAGNOSIS — Z11.4 SCREENING FOR HIV (HUMAN IMMUNODEFICIENCY VIRUS): Primary | ICD-10-CM

## 2020-02-07 DIAGNOSIS — F41.1 GENERALIZED ANXIETY DISORDER: ICD-10-CM

## 2020-02-07 LAB
CREAT UR-MCNC: 74.9 MG/DL
FERRITIN SERPL-MCNC: 408 NG/ML (ref 8–388)
IRON SERPL-MCNC: 83 UG/DL (ref 65–175)
MICROALBUMIN UR-MCNC: <5 MG/L (ref 0–20)
MICROALBUMIN/CREAT 24H UR: <7 MG/G CREATININE (ref 0–30)
PSA SERPL-MCNC: 0.6 NG/ML (ref 0–4)
TIBC SERPL-MCNC: 300 UG/DL (ref 250–450)
TSH SERPL DL<=0.05 MIU/L-ACNC: 2.06 UIU/ML (ref 0.36–3.74)

## 2020-02-07 PROCEDURE — 82043 UR ALBUMIN QUANTITATIVE: CPT | Performed by: INTERNAL MEDICINE

## 2020-02-07 PROCEDURE — 82728 ASSAY OF FERRITIN: CPT

## 2020-02-07 PROCEDURE — 83550 IRON BINDING TEST: CPT

## 2020-02-07 PROCEDURE — G0103 PSA SCREENING: HCPCS

## 2020-02-07 PROCEDURE — 36415 COLL VENOUS BLD VENIPUNCTURE: CPT

## 2020-02-07 PROCEDURE — 82570 ASSAY OF URINE CREATININE: CPT | Performed by: INTERNAL MEDICINE

## 2020-02-07 PROCEDURE — 83540 ASSAY OF IRON: CPT

## 2020-02-07 PROCEDURE — 87389 HIV-1 AG W/HIV-1&-2 AB AG IA: CPT

## 2020-02-07 PROCEDURE — 84443 ASSAY THYROID STIM HORMONE: CPT

## 2020-02-08 LAB — HIV 1+2 AB+HIV1 P24 AG SERPL QL IA: NORMAL

## 2020-05-08 ENCOUNTER — TELEPHONE (OUTPATIENT)
Dept: INTERNAL MEDICINE CLINIC | Facility: CLINIC | Age: 65
End: 2020-05-08

## 2020-05-08 DIAGNOSIS — J30.9 ALLERGIC RHINITIS, UNSPECIFIED SEASONALITY, UNSPECIFIED TRIGGER: ICD-10-CM

## 2020-05-08 RX ORDER — MONTELUKAST SODIUM 10 MG/1
TABLET ORAL
Qty: 90 TABLET | Refills: 1 | Status: SHIPPED | OUTPATIENT
Start: 2020-05-08 | End: 2020-11-16

## 2020-07-02 PROBLEM — K63.5 BENIGN COLONIC POLYP: Status: ACTIVE | Noted: 2020-07-02

## 2020-07-02 PROBLEM — E03.9 HYPOTHYROIDISM: Status: ACTIVE | Noted: 2020-07-02

## 2020-07-02 PROBLEM — C64.9 MALIGNANT NEOPLASM OF KIDNEY (HCC): Status: ACTIVE | Noted: 2017-01-18

## 2020-07-02 PROBLEM — D50.8 IRON DEFICIENCY ANEMIA SECONDARY TO INADEQUATE DIETARY IRON INTAKE: Status: ACTIVE | Noted: 2017-04-12

## 2020-07-02 PROBLEM — I10 HYPERTENSION: Status: ACTIVE | Noted: 2020-07-02

## 2020-07-07 ENCOUNTER — OFFICE VISIT (OUTPATIENT)
Dept: INTERNAL MEDICINE CLINIC | Facility: CLINIC | Age: 65
End: 2020-07-07
Payer: MEDICARE

## 2020-07-07 ENCOUNTER — APPOINTMENT (OUTPATIENT)
Dept: LAB | Facility: CLINIC | Age: 65
End: 2020-07-07
Payer: MEDICARE

## 2020-07-07 VITALS
HEIGHT: 68 IN | HEART RATE: 59 BPM | SYSTOLIC BLOOD PRESSURE: 110 MMHG | BODY MASS INDEX: 27.58 KG/M2 | RESPIRATION RATE: 18 BRPM | TEMPERATURE: 98.2 F | OXYGEN SATURATION: 96 % | WEIGHT: 182 LBS | DIASTOLIC BLOOD PRESSURE: 60 MMHG

## 2020-07-07 DIAGNOSIS — E87.5 HYPERKALEMIA: ICD-10-CM

## 2020-07-07 DIAGNOSIS — E03.9 ACQUIRED HYPOTHYROIDISM: ICD-10-CM

## 2020-07-07 DIAGNOSIS — C64.9 MALIGNANT NEOPLASM OF KIDNEY, UNSPECIFIED LATERALITY (HCC): ICD-10-CM

## 2020-07-07 DIAGNOSIS — I10 BENIGN ESSENTIAL HYPERTENSION: Primary | ICD-10-CM

## 2020-07-07 DIAGNOSIS — I48.0 PAROXYSMAL ATRIAL FIBRILLATION (HCC): ICD-10-CM

## 2020-07-07 DIAGNOSIS — D64.9 ANEMIA, UNSPECIFIED TYPE: ICD-10-CM

## 2020-07-07 DIAGNOSIS — F41.1 GENERALIZED ANXIETY DISORDER: ICD-10-CM

## 2020-07-07 DIAGNOSIS — D50.8 IRON DEFICIENCY ANEMIA SECONDARY TO INADEQUATE DIETARY IRON INTAKE: ICD-10-CM

## 2020-07-07 DIAGNOSIS — E78.2 MIXED HYPERLIPIDEMIA: ICD-10-CM

## 2020-07-07 PROBLEM — R90.89 ABNORMAL BRAIN MRI: Status: RESOLVED | Noted: 2019-10-23 | Resolved: 2020-07-07

## 2020-07-07 LAB
HCT VFR BLD AUTO: 40.7 % (ref 36.5–49.3)
HGB BLD-MCNC: 13.2 G/DL (ref 12–17)
POTASSIUM SERPL-SCNC: 4.9 MMOL/L (ref 3.5–5.3)

## 2020-07-07 PROCEDURE — 84132 ASSAY OF SERUM POTASSIUM: CPT

## 2020-07-07 PROCEDURE — 1036F TOBACCO NON-USER: CPT | Performed by: INTERNAL MEDICINE

## 2020-07-07 PROCEDURE — 36415 COLL VENOUS BLD VENIPUNCTURE: CPT

## 2020-07-07 PROCEDURE — 85018 HEMOGLOBIN: CPT

## 2020-07-07 PROCEDURE — 85014 HEMATOCRIT: CPT

## 2020-07-07 PROCEDURE — 3078F DIAST BP <80 MM HG: CPT | Performed by: INTERNAL MEDICINE

## 2020-07-07 PROCEDURE — 3008F BODY MASS INDEX DOCD: CPT | Performed by: INTERNAL MEDICINE

## 2020-07-07 PROCEDURE — 99214 OFFICE O/P EST MOD 30 MIN: CPT | Performed by: INTERNAL MEDICINE

## 2020-07-07 PROCEDURE — 3074F SYST BP LT 130 MM HG: CPT | Performed by: INTERNAL MEDICINE

## 2020-07-07 NOTE — PROGRESS NOTES
Assessment/Plan:  Problem List Items Addressed This Visit        Endocrine    Hypothyroidism       Cardiovascular and Mediastinum    Benign essential hypertension - Primary    Paroxysmal atrial fibrillation (HCC)       Genitourinary    Malignant neoplasm of kidney (HCC)       Other    Generalized anxiety disorder    Hyperlipidemia    Relevant Orders    Lipid Panel with Direct LDL reflex    Iron deficiency anemia secondary to inadequate dietary iron intake      Other Visit Diagnoses     Hyperkalemia        Relevant Orders    Potassium    Anemia, unspecified type        Relevant Orders    Hemoglobin and hematocrit, blood           Diagnoses and all orders for this visit:    Benign essential hypertension    Acquired hypothyroidism    Paroxysmal atrial fibrillation (HCC)    Malignant neoplasm of kidney, unspecified laterality (HCC)    Mixed hyperlipidemia  -     Lipid Panel with Direct LDL reflex; Future    Generalized anxiety disorder    Iron deficiency anemia secondary to inadequate dietary iron intake    Hyperkalemia  -     Potassium; Future    Anemia, unspecified type  -     Hemoglobin and hematocrit, blood; Future        No problem-specific Assessment & Plan notes found for this encounter  A/P: Doing well and will check labs  Continue current treatment and keep f/u with specialist including getting a second opinion from neurosx  RTC four months for routine  Subjective:      Patient ID: Jesica Goodman is a 59 y o  male  WM RTC for f/u htn,hyperlipidemia, etc  Doing well and no new issues  Remains active w/o difficulty and no falls  Renal cell ca with various mets is in remission  Denies CP, SOB, palpitations, edema, orthopnea, or PND  EMBER is controlled  Vision no worse  Due for labs, but had most at [facility] several months ago and potassium was up and h/h down slightly         The following portions of the patient's history were reviewed and updated as appropriate:   He has a past medical history of Abnormal eye finding, Anemia, Atrial fibrillation ([facility] [address] ), Generalized anxiety disorder, History of kidney cancer, Hyperlipidemia, and Hypertension  ,  does not have any pertinent problems on file  ,   has a past surgical history that includes External ear surgery; Kidney surgery; Tibia fracture surgery; Colonoscopy (N/A, 3/10/2017); and Tonsillectomy (childhood)  ,  family history includes Cancer in his paternal uncle; No Known Problems in his mother  ,   reports that he quit smoking about 21 years ago  His smoking use included cigarettes  He has never used smokeless tobacco  He reports that he does not drink alcohol or use drugs  ,  is allergic to prednisone     Current Outpatient Medications   Medication Sig Dispense Refill    b complex vitamins capsule Take 1 capsule by mouth daily      Bevacizumab (AVASTIN IV) Administer to both eyes q6 weeks       montelukast (SINGULAIR) 10 mg tablet TAKE ONE TABLET AT BEDTIME    90 tablet 1    sertraline (ZOLOFT) 50 mg tablet TAKE 1 TABLET DAILY 90 tablet 4     No current facility-administered medications for this visit  Review of Systems   Constitutional: Negative for activity change, chills, diaphoresis, fatigue and fever  HENT: Negative  Eyes: Positive for visual disturbance  Respiratory: Negative for cough, chest tightness, shortness of breath and wheezing  Cardiovascular: Negative for chest pain, palpitations and leg swelling  Gastrointestinal: Negative for abdominal pain, constipation, diarrhea, nausea and vomiting  Endocrine: Negative for cold intolerance and heat intolerance  Genitourinary: Negative for difficulty urinating, dysuria and frequency  Musculoskeletal: Negative for arthralgias, gait problem and myalgias  Neurological: Negative for dizziness, seizures, syncope, weakness, light-headedness and headaches  Psychiatric/Behavioral: Negative for confusion, dysphoric mood and sleep disturbance  The patient is not nervous/anxious          PHQ-9 Depression Screening    PHQ-9:    Frequency of the following problems over the past two weeks:             Objective:  Vitals:    07/07/20 1129   BP: 110/60   BP Location: Right arm   Patient Position: Sitting   Cuff Size: Adult   Pulse: 59   Resp: 18   Temp: 98 2 °F (36 8 °C)   TempSrc: Tympanic   SpO2: 96%   Weight: 82 6 kg (182 lb)   Height: 5' 8" (1 727 m)     Body mass index is 27 67 kg/m²  Physical Exam   Constitutional: He is oriented to person, place, and time  He appears well-developed and well-nourished  No distress  HENT:   Head: Normocephalic and atraumatic  Mouth/Throat: Oropharynx is clear and moist    Eyes: Pupils are equal, round, and reactive to light  Conjunctivae and EOM are normal    Neck: Neck supple  No JVD present  Cardiovascular: Normal rate, regular rhythm and normal heart sounds  Pulmonary/Chest: Effort normal and breath sounds normal  No respiratory distress  He has no wheezes  He has no rales  Abdominal: Soft  Bowel sounds are normal  He exhibits no distension  There is no tenderness  Musculoskeletal: He exhibits no edema  Neurological: He is alert and oriented to person, place, and time  Psychiatric: He has a normal mood and affect  His behavior is normal  Judgment and thought content normal    Nursing note and vitals reviewed

## 2020-07-07 NOTE — PATIENT INSTRUCTIONS

## 2020-10-12 ENCOUNTER — LAB (OUTPATIENT)
Dept: LAB | Facility: CLINIC | Age: 65
End: 2020-10-12
Payer: MEDICARE

## 2020-10-12 DIAGNOSIS — E78.2 MIXED HYPERLIPIDEMIA: ICD-10-CM

## 2020-10-12 LAB
CHOLEST SERPL-MCNC: 168 MG/DL (ref 50–200)
HDLC SERPL-MCNC: 52 MG/DL
LDLC SERPL CALC-MCNC: 100 MG/DL (ref 0–100)
TRIGL SERPL-MCNC: 82 MG/DL

## 2020-10-12 PROCEDURE — 80061 LIPID PANEL: CPT

## 2020-10-12 PROCEDURE — 36415 COLL VENOUS BLD VENIPUNCTURE: CPT

## 2020-10-22 ENCOUNTER — IMMUNIZATIONS (OUTPATIENT)
Dept: INTERNAL MEDICINE CLINIC | Facility: CLINIC | Age: 65
End: 2020-10-22
Payer: MEDICARE

## 2020-10-22 DIAGNOSIS — Z23 ENCOUNTER FOR IMMUNIZATION: ICD-10-CM

## 2020-10-22 PROCEDURE — 90662 IIV NO PRSV INCREASED AG IM: CPT

## 2020-10-22 PROCEDURE — G0008 ADMIN INFLUENZA VIRUS VAC: HCPCS

## 2020-11-16 ENCOUNTER — CONSULT (OUTPATIENT)
Dept: INTERNAL MEDICINE CLINIC | Facility: CLINIC | Age: 65
End: 2020-11-16
Payer: MEDICARE

## 2020-11-16 VITALS
OXYGEN SATURATION: 98 % | WEIGHT: 182.4 LBS | BODY MASS INDEX: 27.65 KG/M2 | DIASTOLIC BLOOD PRESSURE: 68 MMHG | SYSTOLIC BLOOD PRESSURE: 110 MMHG | RESPIRATION RATE: 18 BRPM | TEMPERATURE: 97.9 F | HEART RATE: 63 BPM | HEIGHT: 68 IN

## 2020-11-16 DIAGNOSIS — Z01.818 VISIT FOR PRE-OPERATIVE EXAMINATION: Primary | ICD-10-CM

## 2020-11-16 DIAGNOSIS — I10 BENIGN ESSENTIAL HYPERTENSION: ICD-10-CM

## 2020-11-16 DIAGNOSIS — K64.9 HEMORRHOIDS, UNSPECIFIED HEMORRHOID TYPE: ICD-10-CM

## 2020-11-16 DIAGNOSIS — I48.0 PAROXYSMAL ATRIAL FIBRILLATION (HCC): ICD-10-CM

## 2020-11-16 DIAGNOSIS — H25.9 AGE-RELATED CATARACT OF BOTH EYES, UNSPECIFIED AGE-RELATED CATARACT TYPE: ICD-10-CM

## 2020-11-16 DIAGNOSIS — F41.9 ANXIETY: ICD-10-CM

## 2020-11-16 PROCEDURE — 99213 OFFICE O/P EST LOW 20 MIN: CPT | Performed by: INTERNAL MEDICINE

## 2020-11-16 RX ORDER — OFLOXACIN 3 MG/ML
SOLUTION/ DROPS OPHTHALMIC
COMMUNITY
Start: 2020-11-10 | End: 2022-01-11

## 2020-11-16 RX ORDER — PREDNISOLONE ACETATE 10 MG/ML
SUSPENSION/ DROPS OPHTHALMIC
COMMUNITY
Start: 2020-11-10 | End: 2022-01-11

## 2020-11-16 RX ORDER — HYDROCORTISONE ACETATE 25 MG/1
25 SUPPOSITORY RECTAL 2 TIMES DAILY
Qty: 28 SUPPOSITORY | Refills: 1 | OUTPATIENT
Start: 2020-11-16 | End: 2022-01-11

## 2021-04-16 ENCOUNTER — OFFICE VISIT (OUTPATIENT)
Dept: INTERNAL MEDICINE CLINIC | Facility: CLINIC | Age: 66
End: 2021-04-16
Payer: MEDICARE

## 2021-04-16 VITALS
HEIGHT: 68 IN | TEMPERATURE: 98 F | OXYGEN SATURATION: 98 % | BODY MASS INDEX: 27.13 KG/M2 | WEIGHT: 179 LBS | SYSTOLIC BLOOD PRESSURE: 102 MMHG | HEART RATE: 72 BPM | DIASTOLIC BLOOD PRESSURE: 62 MMHG | RESPIRATION RATE: 18 BRPM

## 2021-04-16 DIAGNOSIS — E78.2 MIXED HYPERLIPIDEMIA: ICD-10-CM

## 2021-04-16 DIAGNOSIS — Z12.5 SCREENING FOR PROSTATE CANCER: ICD-10-CM

## 2021-04-16 DIAGNOSIS — F41.9 ANXIETY: ICD-10-CM

## 2021-04-16 DIAGNOSIS — E03.9 ACQUIRED HYPOTHYROIDISM: ICD-10-CM

## 2021-04-16 DIAGNOSIS — Z13.31 NEGATIVE DEPRESSION SCREENING: ICD-10-CM

## 2021-04-16 DIAGNOSIS — Z00.00 MEDICARE ANNUAL WELLNESS VISIT, SUBSEQUENT: ICD-10-CM

## 2021-04-16 DIAGNOSIS — M15.9 GENERALIZED OSTEOARTHRITIS: ICD-10-CM

## 2021-04-16 DIAGNOSIS — I48.0 PAROXYSMAL ATRIAL FIBRILLATION (HCC): ICD-10-CM

## 2021-04-16 DIAGNOSIS — C79.31 METASTASIS TO BRAIN (HCC): ICD-10-CM

## 2021-04-16 DIAGNOSIS — D50.8 IRON DEFICIENCY ANEMIA SECONDARY TO INADEQUATE DIETARY IRON INTAKE: ICD-10-CM

## 2021-04-16 DIAGNOSIS — I10 BENIGN ESSENTIAL HYPERTENSION: Primary | ICD-10-CM

## 2021-04-16 DIAGNOSIS — C64.9 MALIGNANT NEOPLASM OF KIDNEY, UNSPECIFIED LATERALITY (HCC): ICD-10-CM

## 2021-04-16 DIAGNOSIS — F41.1 GENERALIZED ANXIETY DISORDER: ICD-10-CM

## 2021-04-16 PROCEDURE — G0439 PPPS, SUBSEQ VISIT: HCPCS | Performed by: INTERNAL MEDICINE

## 2021-04-16 PROCEDURE — 99214 OFFICE O/P EST MOD 30 MIN: CPT | Performed by: INTERNAL MEDICINE

## 2021-04-16 PROCEDURE — 1123F ACP DISCUSS/DSCN MKR DOCD: CPT | Performed by: INTERNAL MEDICINE

## 2021-04-16 NOTE — PROGRESS NOTES
Assessment and Plan:     Problem List Items Addressed This Visit        Endocrine    Hypothyroidism    Relevant Orders    TSH, 3rd generation       Cardiovascular and Mediastinum    Paroxysmal atrial fibrillation (HCC)    Benign essential hypertension - Primary    Relevant Orders    Comprehensive metabolic panel    Microalbumin / creatinine urine ratio       Nervous and Auditory    Metastasis to brain [facility])       Musculoskeletal and Integument    Generalized osteoarthritis       Genitourinary    Malignant neoplasm of kidney (HCC)    Relevant Orders    CBC and differential       Other    Iron deficiency anemia secondary to inadequate dietary iron intake    Relevant Orders    CBC and differential    Hyperlipidemia    Relevant Orders    LDL cholesterol, direct    Triglycerides    Generalized anxiety disorder    Relevant Medications    sertraline (ZOLOFT) 50 mg tablet      Other Visit Diagnoses     Anxiety        Relevant Medications    sertraline (ZOLOFT) 50 mg tablet    Negative depression screening        Medicare annual wellness visit, subsequent        Screening for prostate cancer        Relevant Orders    PSA, Total Screen           Preventive health issues were discussed with patient, and age appropriate screening tests were ordered as noted in patient's After Visit Summary  Personalized health advice and appropriate referrals for health education or preventive services given if needed, as noted in patient's After Visit Summary       History of Present Illness:     Patient presents for Medicare Annual Wellness visit    Patient Care Team:  Metta Leyden, DO as PCP - General (Internal Medicine)  MD Charles Crawford MD as Endoscopist     Problem List:     Patient Active Problem List   Diagnosis    Benign essential hypertension    Bone metastasis ([address] )    Degenerative disk disease    Generalized anxiety disorder    Generalized osteoarthritis    Hyperlipidemia    Iron deficiency anemia secondary to inadequate dietary iron intake    Macular edema    Malignant neoplasm of kidney (HCC)    [facility]astasis [facility] ([facility] [address]    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 ([facility] [address] )    Malignant neoplasm metastatic to bone (HCC)    Paroxysmal atrial fibrillation (HCC)    Personal history of radiation therapy    Pulmonary nodules    Benign colonic polyp    Hypothyroidism      Past Medical and Surgical History:     Past Medical History:   Diagnosis Date    Abnormal eye finding     last assessed 2015    Anemia     Atrial fibrillation (HCC)     Generalized anxiety disorder     History of kidney cancer     Hyperlipidemia     no longer    Hypertension     no longer     Past Surgical History:   Procedure Laterality Date    COLONOSCOPY N/A 3/10/2017    Procedure: COLONOSCOPY;  Surgeon: Elena Gardner MD;  Location: [facility];  Service:    [facility] EXTERNAL EAR SURGERY      KIDNEY SURGERY      TIBIA FRACTURE SURGERY      TONSILLECTOMY  childhood      Family History:     Family History   Problem Relation Age of Onset    No Known Problems Mother     Cancer Paternal Uncle       Social History:     E-Cigarette/Vaping    E-Cigarette Use Never User      E-Cigarette/Vaping Substances    Nicotine No     THC No     CBD No     Flavoring No     Other No     Unknown No      Social History     Socioeconomic History    Marital status: /Civil Union     Spouse name: None    Number of children: None    Years of education: None    Highest education level: None   Occupational History    Occupation: Retired     Employer: OTHER   Social Needs    Financial resource strain: None    Food insecurity     Worry: None     Inability: None    Transportation needs     Medical: None     Non-medical: None   Tobacco Use    Smoking status: Former Smoker     Types: Cigarettes     Quit date:      Years since quittin 3    Smokeless tobacco: Never Used    Tobacco comment: 2 cigarettes   Substance and Sexual Activity    Alcohol use: No    Drug use: Never    Sexual activity: None   Lifestyle    Physical activity     Days per week: None     Minutes per session: None    Stress: None   Relationships    Social connections     Talks on phone: None     Gets together: None     Attends Caodaism service: None     Active member of club or organization: None     Attends meetings of clubs or organizations: None     Relationship status: None    Intimate partner violence     Fear of current or ex partner: None     Emotionally abused: None     Physically abused: None     Forced sexual activity: None   Other Topics Concern    None   Social History Narrative    Caffeine use- 1/2 caf 1/2 dec  Coffee- 1 1/2 cups      Medications and Allergies:     Current Outpatient Medications   Medication Sig Dispense Refill    b complex vitamins capsule Take 1 capsule by mouth daily      Bevacizumab (AVASTIN IV) Administer to both eyes q4 weeks      sertraline (ZOLOFT) 50 mg tablet Take 1 tablet (50 mg total) by mouth daily 90 tablet 1    hydrocortisone (ANUSOL-HC) 25 mg suppository Insert 1 suppository (25 mg total) into the rectum 2 (two) times a day (Patient not taking: Reported on 3/9/2021) 28 suppository 1    ofloxacin (OCUFLOX) 0 3 % ophthalmic solution       prednisoLONE acetate (PRED FORTE) 1 % ophthalmic suspension        No current facility-administered medications for this visit        Allergies   Allergen Reactions    Prednisone       Immunizations:     Immunization History   Administered Date(s) Administered    INFLUENZA 09/29/2014, 10/15/2015, 10/03/2016, 10/02/2017    Influenza Quadrivalent Preservative Free 3 years and older IM 10/15/2015, 10/03/2016    Influenza Quadrivalent, 6-35 Months IM 10/02/2017    Influenza, high dose seasonal 0 7 mL 10/22/2020    Influenza, recombinant, quadrivalent,injectable, preservative free 09/26/2018, 09/16/2019    Influenza, seasonal, injectable 10/01/2013, 09/29/2014    Td (adult), adsorbed 01/01/2004    Tdap 08/18/2015      Health Maintenance:         Topic Date Due    Colonoscopy Surveillance  03/10/2022    Colorectal Cancer Screening  03/10/2027    HIV Screening  Completed    Hepatitis C Screening  Completed         Topic Date Due    Pneumococcal Vaccine: 65+ Years (1 of 1 - PPSV23) Never done      Medicare Health Risk Assessment:     /62   Pulse 72   Temp 98 °F (36 7 °C) (Tympanic)   Resp 18   Ht 5' 8" (1 727 m)   Wt 81 2 kg (179 lb)   SpO2 98%   BMI 27 22 kg/m²      Christ Doherty is here for his Subsequent Wellness visit  Last Medicare Wellness visit information reviewed, patient interviewed and updates made to the record today  Health Risk Assessment:   Patient rates overall health as very good  Patient feels that their physical health rating is same  Patient is very satisfied with their life  Eyesight was rated as slightly worse  Hearing was rated as slightly worse  Patient feels that their emotional and mental health rating is slightly better  Patients states they are never, rarely angry  Patient states they are sometimes unusually tired/fatigued  Pain experienced in the last 7 days has been some  Patient's pain rating has been 3/10  Fall Risk Screening: In the past year, patient has experienced: no history of falling in past year      Home Safety:  Patient has trouble with stairs inside or outside of their home  Patient has working smoke alarms and has no working carbon monoxide detector  Home safety hazards include: none  Medications:   Patient is currently taking over-the-counter supplements  OTC medications include: see medication list  Patient is able to manage medications  Activities of Daily Living (ADLs)/Instrumental Activities of Daily Living (IADLs):   Walk and transfer into and out of bed and chair?: Yes  Dress and groom yourself?: Yes    Bathe or shower yourself?: Yes    Feed yourself?  Yes  Do your laundry/housekeeping?: Yes  Manage your money, pay your bills and track your expenses?: Yes  Make your own meals?: Yes    Do your own shopping?: Yes    Previous Hospitalizations:   Any hospitalizations or ED visits within the last 12 months?: No      Advance Care Planning:   Living will: Yes    Durable POA for healthcare: Yes    Advanced directive: Yes    Advanced directive counseling given: No    Five wishes given: No    Patient declined ACP directive: No    End of Life Decisions reviewed with patient: Yes    Provider agrees with end of life decisions: Yes      Cognitive Screening:   Provider or family/friend/caregiver concerned regarding cognition?: No    PREVENTIVE SCREENINGS      Cardiovascular Screening:    General: Screening Not Indicated and History Lipid Disorder    Due for: Lipid Panel      Diabetes Screening:     General: Screening Current    Due for: Blood Glucose      Colorectal Cancer Screening:     General: Screening Current      Prostate Cancer Screening:    General: Screening Current    Due for: PSA      Osteoporosis Screening:    General: Risks and Benefits Discussed and Patient Declines      Abdominal Aortic Aneurysm (AAA) Screening:    Risk factors include: age between 73-67 yo and tobacco use        Lung Cancer Screening:     General: Screening Not Indicated and History Lung Cancer      Hepatitis C Screening:    General: Screening Current    Screening, Brief Intervention, and Referral to Treatment (SBIRT)    Screening  Typical number of drinks in a day: 0  Typical number of drinks in a week: 0  Interpretation: Low risk drinking behavior  Other Counseling Topics:   Car/seat belt/driving safety, sunscreen and calcium and vitamin D intake and regular weightbearing exercise  A/P: Doing well and no falls reported  Denies depression and feels safe at home  Diverse diet  No problems operating a MV and uses seat belts  Has a living will and POA  No DME or referrals needed today  RTC one year for medicare wellness       Nasreen Chapman,

## 2021-04-16 NOTE — PROGRESS NOTES
BMI Counseling: Body mass index is 27 22 kg/m²  The BMI is above normal  Nutrition recommendations include decreasing portion sizes and encouraging healthy choices of fruits and vegetables  Exercise recommendations include moderate physical activity 150 minutes/week  No pharmacotherapy was ordered  Assessment/Plan:  Problem List Items Addressed This Visit        Endocrine    Hypothyroidism    Relevant Orders    TSH, 3rd generation       Cardiovascular and Mediastinum    Paroxysmal atrial fibrillation (HCC)    Benign essential hypertension - Primary    Relevant Orders    Comprehensive metabolic panel    Microalbumin / creatinine urine ratio       Nervous and Auditory    Metastasis to brain [facility])       Musculoskeletal and Integument    Generalized osteoarthritis       Genitourinary    Malignant neoplasm of kidney (HCC)    Relevant Orders    CBC and differential       Other    Iron deficiency anemia secondary to inadequate dietary iron intake    Relevant Orders    CBC and differential    Hyperlipidemia    Relevant Orders    LDL cholesterol, direct    Triglycerides    Generalized anxiety disorder    Relevant Medications    sertraline (ZOLOFT) 50 mg tablet      Other Visit Diagnoses     Anxiety        Relevant Medications    sertraline (ZOLOFT) 50 mg tablet    Negative depression screening        Medicare annual wellness visit, subsequent        Screening for prostate cancer        Relevant Orders    PSA, Total Screen           Diagnoses and all orders for this visit:    Benign essential hypertension  -     Comprehensive metabolic panel; Future  -     Microalbumin / creatinine urine ratio    Anxiety  -     sertraline (ZOLOFT) 50 mg tablet; Take 1 tablet (50 mg total) by mouth daily    Acquired hypothyroidism  -     TSH, 3rd generation;  Future    Paroxysmal atrial fibrillation (HCC)    Generalized osteoarthritis    Malignant neoplasm of kidney, unspecified laterality (HCC)  -     CBC and differential; Future    Mixed hyperlipidemia  -     LDL cholesterol, direct; Future  -     Triglycerides; Future    Generalized anxiety disorder    Iron deficiency anemia secondary to inadequate dietary iron intake  -     CBC and differential; Future    Negative depression screening    Medicare annual wellness visit, subsequent    Screening for prostate cancer  -     PSA, Total Screen; Future    Metastasis to brain [facility])        No problem-specific Assessment & Plan notes found for this encounter  A/P: Doing well and will check labs  Discussed BMI and will give information on diet and exercise  Continue current treatment and RTC four months for routine  Subjective:      Patient ID: Loyda Mccord is a 72 y o  male  WM RTC for f/u htn, hypothyroidism, etc  Doing well and no new issues, but vision and hearing continue to be his biggest concerns and is seeing specialist  Remains active w/o difficulty and no falls  Denies CP, SOB, palpitations, edema, orthopnea, or PND  Cancer remains in remission  Chronic pain is manageable  EMBER is controlled  Due for labs  The following portions of the patient's history were reviewed and updated as appropriate:   He has a past medical history of Abnormal eye finding, Anemia, Atrial fibrillation ([address] ), Generalized anxiety disorder, History of kidney cancer, Hyperlipidemia, and Hypertension  ,  does not have any pertinent problems on file  ,   has a past surgical history that includes External ear surgery; Kidney surgery; Tibia fracture surgery; Colonoscopy (N/A, 3/10/2017); and Tonsillectomy (childhood)  ,  family history includes Cancer in his paternal uncle; No Known Problems in his mother  ,   reports that he quit smoking about 22 years ago  His smoking use included cigarettes  He has never used smokeless tobacco  He reports that he does not drink alcohol or use drugs  ,  is allergic to prednisone     Current Outpatient Medications   Medication Sig Dispense Refill    b complex vitamins capsule Take 1 capsule by mouth daily      Bevacizumab (AVASTIN IV) Administer to both eyes q4 weeks      sertraline (ZOLOFT) 50 mg tablet Take 1 tablet (50 mg total) by mouth daily 90 tablet 1    hydrocortisone (ANUSOL-HC) 25 mg suppository Insert 1 suppository (25 mg total) into the rectum 2 (two) times a day (Patient not taking: Reported on 3/9/2021) 28 suppository 1    ofloxacin (OCUFLOX) 0 3 % ophthalmic solution       prednisoLONE acetate (PRED FORTE) 1 % ophthalmic suspension        No current facility-administered medications for this visit  Review of Systems   Constitutional: Negative for activity change, chills, diaphoresis, fatigue and fever  HENT: Positive for hearing loss  Eyes: Positive for visual disturbance  Respiratory: Negative for cough, chest tightness, shortness of breath and wheezing  Cardiovascular: Negative for chest pain, palpitations and leg swelling  Gastrointestinal: Negative for abdominal pain, constipation, diarrhea, nausea and vomiting  Endocrine: Negative for cold intolerance and heat intolerance  Genitourinary: Negative for difficulty urinating, dysuria and frequency  Musculoskeletal: Negative for arthralgias, gait problem and myalgias  Neurological: Negative for dizziness, seizures, syncope, weakness, light-headedness and headaches  Psychiatric/Behavioral: Negative for confusion, dysphoric mood and sleep disturbance  The patient is not nervous/anxious  PHQ-9 Depression Screening    PHQ-9:   Frequency of the following problems over the past two weeks:            Objective:  Vitals:    04/16/21 1054   BP: 102/62   Pulse: 72   Resp: 18   Temp: 98 °F (36 7 °C)   TempSrc: Tympanic   SpO2: 98%   Weight: 81 2 kg (179 lb)   Height: 5' 8" (1 727 m)     Body mass index is 27 22 kg/m²  Physical Exam  Vitals signs and nursing note reviewed  Constitutional:       General: He is not in acute distress  Appearance: Normal appearance   He is not ill-appearing  HENT:      Head: Normocephalic and atraumatic  Mouth/Throat:      Mouth: Mucous membranes are moist    Eyes:      Extraocular Movements: Extraocular movements intact  Conjunctiva/sclera: Conjunctivae normal       Pupils: Pupils are equal, round, and reactive to light  Neck:      Musculoskeletal: Neck supple  Vascular: No carotid bruit  Cardiovascular:      Rate and Rhythm: Normal rate and regular rhythm  Heart sounds: Normal heart sounds  Pulmonary:      Effort: Pulmonary effort is normal  No respiratory distress  Breath sounds: Normal breath sounds  No wheezing or rales  Abdominal:      General: Bowel sounds are normal  There is no distension  Palpations: Abdomen is soft  Tenderness: There is no abdominal tenderness  Musculoskeletal:      Right lower leg: No edema  Left lower leg: No edema  Neurological:      General: No focal deficit present  Mental Status: He is alert and oriented to person, place, and time  Mental status is at baseline  Psychiatric:         Mood and Affect: Mood normal          Behavior: Behavior normal          Thought Content:  Thought content normal          Judgment: Judgment normal

## 2021-04-16 NOTE — PATIENT INSTRUCTIONS
Medicare Preventive Visit Patient Instructions  Thank you for completing your Welcome to Medicare Visit or Medicare Annual Wellness Visit today  Your next wellness visit will be due in one year (4/17/2022)  The screening/preventive services that you may require over the next 5-10 years are detailed below  Some tests may not apply to you based off risk factors and/or age  Screening tests ordered at today's visit but not completed yet may show as past due  Also, please note that scanned in results may not display below  Preventive Screenings:  Service Recommendations Previous Testing/Comments   Colorectal Cancer Screening  · Colonoscopy    · Fecal Occult Blood Test (FOBT)/Fecal Immunochemical Test (FIT)  · Fecal DNA/Cologuard Test  · Flexible Sigmoidoscopy Age: 54-65 years old   Colonoscopy: every 10 years (May be performed more frequently if at higher risk)  OR  FOBT/FIT: every 1 year  OR  Cologuard: every 3 years  OR  Sigmoidoscopy: every 5 years  Screening may be recommended earlier than age 48 if at higher risk for colorectal cancer  Also, an individualized decision between you and your healthcare provider will decide whether screening between the ages of 74-80 would be appropriate  Colonoscopy: 03/10/2017  FOBT/FIT: Not on file  Cologuard: Not on file  Sigmoidoscopy: Not on file    Screening Current     Prostate Cancer Screening Individualized decision between patient and health care provider in men between ages of 53-78   Medicare will cover every 12 months beginning on the day after your 50th birthday PSA: 0 6 ng/mL           Hepatitis C Screening Once for adults born between 1945 and 1965  More frequently in patients at high risk for Hepatitis C Hep C Antibody: 09/26/2018    Screening Current   Diabetes Screening 1-2 times per year if you're at risk for diabetes or have pre-diabetes Fasting glucose: 84 mg/dL   A1C: 5 2 %        Cholesterol Screening Once every 5 years if you don't have a lipid disorder  May order more often based on risk factors  Lipid panel: 10/12/2020    Screening Not Indicated  History Lipid Disorder      Other Preventive Screenings Covered by Medicare:  1  Abdominal Aortic Aneurysm (AAA) Screening: covered once if your at risk  You're considered to be at risk if you have a family history of AAA or a male between the age of 73-68 who smoking at least 100 cigarettes in your lifetime  2  Lung Cancer Screening: covers low dose CT scan once per year if you meet all of the following conditions: (1) Age 50-69; (2) No signs or symptoms of lung cancer; (3) Current smoker or have quit smoking within the last 15 years; (4) You have a tobacco smoking history of at least 30 pack years (packs per day x number of years you smoked); (5) You get a written order from a healthcare provider  3  Glaucoma Screening: covered annually if you're considered high risk: (1) You have diabetes OR (2) Family history of glaucoma OR (3)  aged 48 and older OR (3)  American aged 72 and older  3  Osteoporosis Screening: covered every 2 years if you meet one of the following conditions: (1) Have a vertebral abnormality; (2) On glucocorticoid therapy for more than 3 months; (3) Have primary hyperparathyroidism; (4) On osteoporosis medications and need to assess response to drug therapy  5  HIV Screening: covered annually if you're between the age of 12-76  Also covered annually if you are younger than 13 and older than 72 with risk factors for HIV infection  For pregnant patients, it is covered up to 3 times per pregnancy      Immunizations:  Immunization Recommendations   Influenza Vaccine Annual influenza vaccination during flu season is recommended for all persons aged >= 6 months who do not have contraindications   Pneumococcal Vaccine (Prevnar and Pneumovax)  * Prevnar = PCV13  * Pneumovax = PPSV23 Adults 25-60 years old: 1-3 doses may be recommended based on certain risk factors  Adults 65+ years old: Prevnar (PCV13) vaccine recommended followed by Pneumovax (PPSV23) vaccine  If already received PPSV23 since turning 65, then PCV13 recommended at least one year after PPSV23 dose  Hepatitis B Vaccine 3 dose series if at intermediate or high risk (ex: diabetes, end stage renal disease, liver disease)   Tetanus (Td) Vaccine - COST NOT COVERED BY MEDICARE PART B Following completion of primary series, a booster dose should be given every 10 years to maintain immunity against tetanus  Td may also be given as tetanus wound prophylaxis  Tdap Vaccine - COST NOT COVERED BY MEDICARE PART B Recommended at least once for all adults  For pregnant patients, recommended with each pregnancy  Shingles Vaccine (Shingrix) - COST NOT COVERED BY MEDICARE PART B  2 shot series recommended in those aged 48 and above     Health Maintenance Due:      Topic Date Due    Colonoscopy Surveillance  03/10/2022    Colorectal Cancer Screening  03/10/2027    HIV Screening  Completed    Hepatitis C Screening  Completed     Immunizations Due:      Topic Date Due    Pneumococcal Vaccine: 65+ Years (1 of 1 - PPSV23) Never done     Advance Directives   What are advance directives? Advance directives are legal documents that state your wishes and plans for medical care  These plans are made ahead of time in case you lose your ability to make decisions for yourself  Advance directives can apply to any medical decision, such as the treatments you want, and if you want to donate organs  What are the types of advance directives? There are many types of advance directives, and each state has rules about how to use them  You may choose a combination of any of the following:  · Living will: This is a written record of the treatment you want  You can also choose which treatments you do not want, which to limit, and which to stop at a certain time  This includes surgery, medicine, IV fluid, and tube feedings     · Durable power of  for healthcare [facility]): This is a written record that states who you want to make healthcare choices for you when you are unable to make them for yourself  This person, called a proxy, is usually a family member or a friend  You may choose more than 1 proxy  · Do not resuscitate (DNR) order:  A DNR order is used in case your heart stops beating or you stop breathing  It is a request not to have certain forms of treatment, such as CPR  A DNR order may be included in other types of advance directives  · Medical directive: This covers the care that you want if you are in a coma, near death, or unable to make decisions for yourself  You can list the treatments you want for each condition  Treatment may include pain medicine, surgery, blood transfusions, dialysis, IV or tube feedings, and a ventilator (breathing machine)  · Values history: This document has questions about your views, beliefs, and how you feel and think about life  This information can help others choose the care that you would choose  Why are advance directives important? An advance directive helps you control your care  Although spoken wishes may be used, it is better to have your wishes written down  Spoken wishes can be misunderstood, or not followed  Treatments may be given even if you do not want them  An advance directive may make it easier for your family to make difficult choices about your care  Weight Management   Why it is important to manage your weight:  Being overweight increases your risk of health conditions such as heart disease, high blood pressure, type 2 diabetes, and certain types of cancer  It can also increase your risk for osteoarthritis, sleep apnea, and other respiratory problems  Aim for a slow, steady weight loss  Even a small amount of weight loss can lower your risk of health problems  How to lose weight safely:  A safe and healthy way to lose weight is to eat fewer calories and get regular exercise   You can lose up about 1 pound a week by decreasing the number of calories you eat by 500 calories each day  Healthy meal plan for weight management:  A healthy meal plan includes a variety of foods, contains fewer calories, and helps you stay healthy  A healthy meal plan includes the following:  · Eat whole-grain foods more often  A healthy meal plan should contain fiber  Fiber is the part of grains, fruits, and vegetables that is not broken down by your body  Whole-grain foods are healthy and provide extra fiber in your diet  Some examples of whole-grain foods are whole-wheat breads and pastas, oatmeal, brown rice, and bulgur  · Eat a variety of vegetables every day  Include dark, leafy greens such as spinach, kale, lyndsay greens, and mustard greens  Eat yellow and orange vegetables such as carrots, sweet potatoes, and winter squash  · Eat a variety of fruits every day  Choose fresh or canned fruit (canned in its own juice or light syrup) instead of juice  Fruit juice has very little or no fiber  · Eat low-fat dairy foods  Drink fat-free (skim) milk or 1% milk  Eat fat-free yogurt and low-fat cottage cheese  Try low-fat cheeses such as mozzarella and other reduced-fat cheeses  · Choose meat and other protein foods that are low in fat  Choose beans or other legumes such as split peas or lentils  Choose fish, skinless poultry (chicken or turkey), or lean cuts of red meat (beef or pork)  Before you cook meat or poultry, cut off any visible fat  · Use less fat and oil  Try baking foods instead of frying them  Add less fat, such as margarine, sour cream, regular salad dressing and mayonnaise to foods  Eat fewer high-fat foods  Some examples of high-fat foods include french fries, doughnuts, ice cream, and cakes  · Eat fewer sweets  Limit foods and drinks that are high in sugar  This includes candy, cookies, regular soda, and sweetened drinks    Exercise:  Exercise at least 30 minutes per day on most days of the week  Some examples of exercise include walking, biking, dancing, and swimming  You can also fit in more physical activity by taking the stairs instead of the elevator or parking farther away from stores  Ask your healthcare provider about the best exercise plan for you  © Copyright [employer] 2018 Information is for End User's use only and may not be sold, redistributed or otherwise used for commercial purposes  All illustrations and images included in CareNotes® are the copyrighted property of [employer]  or [employer] [facility] Hypert[facility]   AMBULATORY CARE:   Hypertension  is high blood pressure  Your blood pressure is the force of your blood moving against the walls of your arteries  Hypertension causes your blood pressure to get so high that your heart has to work much harder than normal  This can damage your heart  Even if you have hypertension for years, lifestyle changes, medicines, or both can help bring your blood pressure to normal   Call 911 for any of the following:   · You have chest pain  · You have any of the following signs of a heart attack:      ? Squeezing, pressure, or pain in your chest    ? You may  also have any of the following:     § Discomfort or pain in your back, neck, jaw, stomach, or arm    § Shortness of breath    § Nausea or vomiting    § Lightheadedness or a sudden cold sweat    · You become confused or have difficulty speaking  · You suddenly feel lightheaded or have trouble breathing  Seek care immediately if:   · You have a severe headache or vision loss  · You have weakness in an arm or leg  Contact your healthcare provider if:   · You feel faint, dizzy, confused, or drowsy  · You have been taking your blood pressure medicine but your pressure is higher than your provider says it should be  · You have questions or concerns about your condition or care      Treatment for chronic hypertension  may include medicine to lower your blood pressure and cholesterol levels  A low cholesterol level helps prevent heart disease and makes it easier to control your blood pressure  Heart disease can make your blood pressure harder to control  You may also need to make lifestyle changes  What you need to know about the stages of hypertension:       · Normal blood pressure is 119/79 or lower   Your healthcare provider may only check your blood pressure each year if it stays at a normal level  · Elevated blood pressure is 120/79 to 129/79   This is sometimes called prehypertension  Your healthcare provider may suggest lifestyle changes to help lower your blood pressure to a normal level  He or she may then check it again in 3 to 6 months  · Stage 1 hypertension is 130/80  to 139/89   Your provider may recommend lifestyle changes, medication, and checks every 3 to 6 months until your blood pressure is controlled  · Stage 2 hypertension is 140/90 or higher   Your provider will recommend lifestyle changes and have you take 2 kinds of hypertension medicines  You will also need to have your blood pressure checked monthly until it is controlled  Manage chronic hypertension:   · Check your blood pressure at home  Avoid smoking, caffeine, and exercise at least 30 minutes before checking your blood pressure  Sit and rest for 5 minutes before you take your blood pressure  Extend your arm and support it on a flat surface  Your arm should be at the same level as your heart  Follow the directions that came with your blood pressure monitor  Check your blood pressure 2 times, 1 minute apart, before you take your medicine in the morning  Also check your blood pressure before your evening meal  Keep a record of your readings and bring it to your follow-up visits  Ask your healthcare provider what your blood pressure should be  · Manage any other health conditions you have  Health conditions such as diabetes can increase your risk for hypertension   Follow your healthcare provider's instructions and take all your medicines as directed  Talk to your healthcare provider about any new health conditions you have recently developed  · Ask about all medicines  Certain medicines can increase your blood pressure  Examples include oral birth control pills, decongestants, herbal supplements, and NSAIDs, such as ibuprofen  Your healthcare provider can tell you which medicines are safe for you to take  This includes prescription and over-the-counter medicines  Lifestyle changes you can make to lower your blood pressure: Your provider may want you to make more lifestyle changes if you are having trouble controlling your blood pressure  This may feel difficult over time, especially if you think you are making good changes but your pressure is still high  It might help to focus on one new change at a time  For example, try to add 1 more day of exercise, or exercise for an extra 10 minutes on 2 days  Small changes can make a big difference  Your healthcare provider can also refer you to specialists such as a dietitian who can help you make small changes  · Limit sodium (salt) as directed  Too much sodium can affect your fluid balance  Check labels to find low-sodium or no-salt-added foods  Some low-sodium foods use potassium salts for flavor  Too much potassium can also cause health problems  Your healthcare provider will tell you how much sodium and potassium are safe for you to have in a day  He or she may recommend that you limit sodium to 2,300 mg a day  · Follow the meal plan recommended by your healthcare provider  A dietitian or your provider can give you more information on low-sodium plans or the DASH (Dietary Approaches to Stop Hypertension) eating plan  The DASH plan is low in sodium, unhealthy fats, and total fat  It is high in potassium, calcium, and fiber  · Exercise to maintain a healthy weight    Exercise at least 30 minutes per day, on most days of the week  This will help decrease your blood pressure  Ask your healthcare provider about the best exercise plan for you  · Decrease stress  This may help lower your blood pressure  Learn ways to relax, such as deep breathing or listening to music  · Limit alcohol as directed  Alcohol can increase your blood pressure  A drink of alcohol is 12 ounces of beer, 5 ounces of wine, or 1½ ounces of liquor  · Do not smoke  Nicotine and other chemicals in cigarettes and cigars can increase your blood pressure and also cause lung damage  Ask your healthcare provider for information if you currently smoke and need help to quit  E-cigarettes or smokeless tobacco still contain nicotine  Talk to your healthcare provider before you use these products  Follow up with your healthcare provider as directed: You will need to return to have your blood pressure checked and to have other lab tests done  Write down your questions so you remember to ask them during your visits  © Copyright [address] [place] [address] Drive Information is for End User's use only and may not be sold, redistributed or otherwise used for commercial purposes  All illustrations and images included in CareNotes® are the copyrighted property of [employer]  or [address] [place] [address]  The above information is an  only  It is not intended as medical advice for individual conditions or treatments  Talk to your doctor, nurse or pharmacist before following any medical regimen to see if it is safe and effective for you  Medicare Preventive Visit Patient Instructions  Thank you for completing your Welcome to Medicare Visit or Medicare Annual Wellness Visit today  Your next wellness visit will be due in one year (4/17/2022)  The screening/preventive services that you may require over the next 5-10 years are detailed below  Some tests may not apply to you based off risk factors and/or age   Screening tests ordered at today's visit but not completed yet may show as past due  Also, please note that scanned in results may not display below  Preventive Screenings:  Service Recommendations Previous Testing/Comments   Colorectal Cancer Screening  · Colonoscopy    · Fecal Occult Blood Test (FOBT)/Fecal Immunochemical Test (FIT)  · Fecal DNA/Cologuard Test  · Flexible Sigmoidoscopy Age: 54-65 years old   Colonoscopy: every 10 years (May be performed more frequently if at higher risk)  OR  FOBT/FIT: every 1 year  OR  Cologuard: every 3 years  OR  Sigmoidoscopy: every 5 years  Screening may be recommended earlier than age 48 if at higher risk for colorectal cancer  Also, an individualized decision between you and your healthcare provider will decide whether screening between the ages of 74-80 would be appropriate  Colonoscopy: 03/10/2017  FOBT/FIT: Not on file  Cologuard: Not on file  Sigmoidoscopy: Not on file    Screening Current     Prostate Cancer Screening Individualized decision between patient and health care provider in men between ages of 53-78   Medicare will cover every 12 months beginning on the day after your 50th birthday PSA: 0 6 ng/mL           Hepatitis C Screening Once for adults born between 1945 and 1965  More frequently in patients at high risk for Hepatitis C Hep C Antibody: 09/26/2018    Screening Current   Diabetes Screening 1-2 times per year if you're at risk for diabetes or have pre-diabetes Fasting glucose: 84 mg/dL   A1C: 5 2 %        Cholesterol Screening Once every 5 years if you don't have a lipid disorder  May order more often based on risk factors  Lipid panel: 10/12/2020    Screening Not Indicated  History Lipid Disorder      Other Preventive Screenings Covered by Medicare:  6  Abdominal Aortic Aneurysm (AAA) Screening: covered once if your at risk  You're considered to be at risk if you have a family history of AAA or a male between the age of 73-68 who smoking at least 100 cigarettes in your lifetime    7  Lung Cancer Screening: covers low dose CT scan once per year if you meet all of the following conditions: (1) Age 50-69; (2) No signs or symptoms of lung cancer; (3) Current smoker or have quit smoking within the last 15 years; (4) You have a tobacco smoking history of at least 30 pack years (packs per day x number of years you smoked); (5) You get a written order from a healthcare provider  8  Glaucoma Screening: covered annually if you're considered high risk: (1) You have diabetes OR (2) Family history of glaucoma OR (3)  aged 48 and older OR (3)  American aged 72 and older  5  Osteoporosis Screening: covered every 2 years if you meet one of the following conditions: (1) Have a vertebral abnormality; (2) On glucocorticoid therapy for more than 3 months; (3) Have primary hyperparathyroidism; (4) On osteoporosis medications and need to assess response to drug therapy  10  HIV Screening: covered annually if you're between the age of 12-76  Also covered annually if you are younger than 13 and older than 72 with risk factors for HIV infection  For pregnant patients, it is covered up to 3 times per pregnancy  Immunizations:  Immunization Recommendations   Influenza Vaccine Annual influenza vaccination during flu season is recommended for all persons aged >= 6 months who do not have contraindications   Pneumococcal Vaccine (Prevnar and Pneumovax)  * Prevnar = PCV13  * Pneumovax = PPSV23 Adults 25-60 years old: 1-3 doses may be recommended based on certain risk factors  Adults 72 years old: Prevnar (PCV13) vaccine recommended followed by Pneumovax (PPSV23) vaccine  If already received PPSV23 since turning 65, then PCV13 recommended at least one year after PPSV23 dose     Hepatitis B Vaccine 3 dose series if at intermediate or high risk (ex: diabetes, end stage renal disease, liver disease)   Tetanus (Td) Vaccine - COST NOT COVERED BY MEDICARE PART B Following completion of primary series, a booster dose should be given every 10 years to maintain immunity against tetanus  Td may also be given as tetanus wound prophylaxis  Tdap Vaccine - COST NOT COVERED BY MEDICARE PART B Recommended at least once for all adults  For pregnant patients, recommended with each pregnancy  Shingles Vaccine (Shingrix) - COST NOT COVERED BY MEDICARE PART B  2 shot series recommended in those aged 48 and above     Health Maintenance Due:      Topic Date Due    Colonoscopy Surveillance  03/10/2022    Colorectal Cancer Screening  03/10/2027    HIV Screening  Completed    Hepatitis C Screening  Completed     Immunizations Due:      Topic Date Due    Pneumococcal Vaccine: 65+ Years (1 of 1 - PPSV23) Never done     Advance Directives   What are advance directives? Advance directives are legal documents that state your wishes and plans for medical care  These plans are made ahead of time in case you lose your ability to make decisions for yourself  Advance directives can apply to any medical decision, such as the treatments you want, and if you want to donate organs  What are the types of advance directives? There are many types of advance directives, and each state has rules about how to use them  You may choose a combination of any of the following:  · Living will: This is a written record of the treatment you want  You can also choose which treatments you do not want, which to limit, and which to stop at a certain time  This includes surgery, medicine, IV fluid, and tube feedings  · Durable power of  for healthcare [facility]): This is a written record that states who you want to make healthcare choices for you when you are unable to make them for yourself  This person, called a proxy, is usually a family member or a friend  You may choose more than 1 proxy  · Do not resuscitate (DNR) order:  A DNR order is used in case your heart stops beating or you stop breathing   It is a request not to have certain forms of treatment, such as CPR  A DNR order may be included in other types of advance directives  · Medical directive: This covers the care that you want if you are in a coma, near death, or unable to make decisions for yourself  You can list the treatments you want for each condition  Treatment may include pain medicine, surgery, blood transfusions, dialysis, IV or tube feedings, and a ventilator (breathing machine)  · Values history: This document has questions about your views, beliefs, and how you feel and think about life  This information can help others choose the care that you would choose  Why are advance directives important? An advance directive helps you control your care  Although spoken wishes may be used, it is better to have your wishes written down  Spoken wishes can be misunderstood, or not followed  Treatments may be given even if you do not want them  An advance directive may make it easier for your family to make difficult choices about your care  Weight Management   Why it is important to manage your weight:  Being overweight increases your risk of health conditions such as heart disease, high blood pressure, type 2 diabetes, and certain types of cancer  It can also increase your risk for osteoarthritis, sleep apnea, and other respiratory problems  Aim for a slow, steady weight loss  Even a small amount of weight loss can lower your risk of health problems  How to lose weight safely:  A safe and healthy way to lose weight is to eat fewer calories and get regular exercise  You can lose up about 1 pound a week by decreasing the number of calories you eat by 500 calories each day  Healthy meal plan for weight management:  A healthy meal plan includes a variety of foods, contains fewer calories, and helps you stay healthy  A healthy meal plan includes the following:  · Eat whole-grain foods more often  A healthy meal plan should contain fiber   Fiber is the part of grains, fruits, and vegetables that is not broken down by your body  Whole-grain foods are healthy and provide extra fiber in your diet  Some examples of whole-grain foods are whole-wheat breads and pastas, oatmeal, brown rice, and bulgur  · Eat a variety of vegetables every day  Include dark, leafy greens such as spinach, kale, lyndsay greens, and mustard greens  Eat yellow and orange vegetables such as carrots, sweet potatoes, and winter squash  · Eat a variety of fruits every day  Choose fresh or canned fruit (canned in its own juice or light syrup) instead of juice  Fruit juice has very little or no fiber  · Eat low-fat dairy foods  Drink fat-free (skim) milk or 1% milk  Eat fat-free yogurt and low-fat cottage cheese  Try low-fat cheeses such as mozzarella and other reduced-fat cheeses  · Choose meat and other protein foods that are low in fat  Choose beans or other legumes such as split peas or lentils  Choose fish, skinless poultry (chicken or turkey), or lean cuts of red meat (beef or pork)  Before you cook meat or poultry, cut off any visible fat  · Use less fat and oil  Try baking foods instead of frying them  Add less fat, such as margarine, sour cream, regular salad dressing and mayonnaise to foods  Eat fewer high-fat foods  Some examples of high-fat foods include french fries, doughnuts, ice cream, and cakes  · Eat fewer sweets  Limit foods and drinks that are high in sugar  This includes candy, cookies, regular soda, and sweetened drinks  Exercise:  Exercise at least 30 minutes per day on most days of the week  Some examples of exercise include walking, biking, dancing, and swimming  You can also fit in more physical activity by taking the stairs instead of the elevator or parking farther away from stores  Ask your healthcare provider about the best exercise plan for you  © Copyright [employer] 2018 Information is for End User's use only and may not be sold, redistributed or otherwise used for commercial purposes  All illustrations and images included in CareNotes® are the copyrighted property of A D A M , Inc  or Maddie Eisenberg

## 2021-04-21 ENCOUNTER — TELEPHONE (OUTPATIENT)
Dept: INTERNAL MEDICINE CLINIC | Facility: CLINIC | Age: 66
End: 2021-04-21

## 2021-04-21 ENCOUNTER — APPOINTMENT (OUTPATIENT)
Dept: LAB | Facility: CLINIC | Age: 66
End: 2021-04-21
Payer: MEDICARE

## 2021-04-21 DIAGNOSIS — C64.9 MALIGNANT NEOPLASM OF KIDNEY, UNSPECIFIED LATERALITY (HCC): ICD-10-CM

## 2021-04-21 DIAGNOSIS — E03.9 ACQUIRED HYPOTHYROIDISM: ICD-10-CM

## 2021-04-21 DIAGNOSIS — D50.8 IRON DEFICIENCY ANEMIA SECONDARY TO INADEQUATE DIETARY IRON INTAKE: ICD-10-CM

## 2021-04-21 DIAGNOSIS — Z12.5 SCREENING FOR PROSTATE CANCER: ICD-10-CM

## 2021-04-21 DIAGNOSIS — I10 BENIGN ESSENTIAL HYPERTENSION: ICD-10-CM

## 2021-04-21 DIAGNOSIS — E78.2 MIXED HYPERLIPIDEMIA: ICD-10-CM

## 2021-04-21 DIAGNOSIS — J30.89 ENVIRONMENTAL AND SEASONAL ALLERGIES: Primary | ICD-10-CM

## 2021-04-21 LAB
ALBUMIN SERPL BCP-MCNC: 4.5 G/DL (ref 3.5–5)
ALP SERPL-CCNC: 106 U/L (ref 46–116)
ALT SERPL W P-5'-P-CCNC: 24 U/L (ref 12–78)
ANION GAP SERPL CALCULATED.3IONS-SCNC: 2 MMOL/L (ref 4–13)
AST SERPL W P-5'-P-CCNC: 14 U/L (ref 5–45)
BASOPHILS # BLD AUTO: 0.02 THOUSANDS/ΜL (ref 0–0.1)
BASOPHILS NFR BLD AUTO: 0 % (ref 0–1)
BILIRUB SERPL-MCNC: 0.84 MG/DL (ref 0.2–1)
BUN SERPL-MCNC: 25 MG/DL (ref 5–25)
CALCIUM SERPL-MCNC: 10.3 MG/DL (ref 8.3–10.1)
CHLORIDE SERPL-SCNC: 107 MMOL/L (ref 100–108)
CO2 SERPL-SCNC: 29 MMOL/L (ref 21–32)
CREAT SERPL-MCNC: 1.2 MG/DL (ref 0.6–1.3)
CREAT UR-MCNC: 71.4 MG/DL
EOSINOPHIL # BLD AUTO: 0.07 THOUSAND/ΜL (ref 0–0.61)
EOSINOPHIL NFR BLD AUTO: 1 % (ref 0–6)
ERYTHROCYTE [DISTWIDTH] IN BLOOD BY AUTOMATED COUNT: 12.3 % (ref 11.6–15.1)
GFR SERPL CREATININE-BSD FRML MDRD: 63 ML/MIN/1.73SQ M
GLUCOSE P FAST SERPL-MCNC: 85 MG/DL (ref 65–99)
HCT VFR BLD AUTO: 45.2 % (ref 36.5–49.3)
HGB BLD-MCNC: 14.7 G/DL (ref 12–17)
IMM GRANULOCYTES # BLD AUTO: 0.01 THOUSAND/UL (ref 0–0.2)
IMM GRANULOCYTES NFR BLD AUTO: 0 % (ref 0–2)
LDLC SERPL DIRECT ASSAY-MCNC: 121 MG/DL (ref 0–100)
LYMPHOCYTES # BLD AUTO: 1.23 THOUSANDS/ΜL (ref 0.6–4.47)
LYMPHOCYTES NFR BLD AUTO: 21 % (ref 14–44)
MCH RBC QN AUTO: 29.9 PG (ref 26.8–34.3)
MCHC RBC AUTO-ENTMCNC: 32.5 G/DL (ref 31.4–37.4)
MCV RBC AUTO: 92 FL (ref 82–98)
MICROALBUMIN UR-MCNC: <5 MG/L (ref 0–20)
MICROALBUMIN/CREAT 24H UR: <7 MG/G CREATININE (ref 0–30)
MONOCYTES # BLD AUTO: 0.64 THOUSAND/ΜL (ref 0.17–1.22)
MONOCYTES NFR BLD AUTO: 11 % (ref 4–12)
NEUTROPHILS # BLD AUTO: 3.8 THOUSANDS/ΜL (ref 1.85–7.62)
NEUTS SEG NFR BLD AUTO: 67 % (ref 43–75)
NRBC BLD AUTO-RTO: 0 /100 WBCS
PLATELET # BLD AUTO: 191 THOUSANDS/UL (ref 149–390)
PMV BLD AUTO: 10.1 FL (ref 8.9–12.7)
POTASSIUM SERPL-SCNC: 5.3 MMOL/L (ref 3.5–5.3)
PROT SERPL-MCNC: 8.1 G/DL (ref 6.4–8.2)
PSA SERPL-MCNC: 0.8 NG/ML (ref 0–4)
RBC # BLD AUTO: 4.92 MILLION/UL (ref 3.88–5.62)
SODIUM SERPL-SCNC: 138 MMOL/L (ref 136–145)
TRIGL SERPL-MCNC: 106 MG/DL
TSH SERPL DL<=0.05 MIU/L-ACNC: 1.72 UIU/ML (ref 0.36–3.74)
WBC # BLD AUTO: 5.77 THOUSAND/UL (ref 4.31–10.16)

## 2021-04-21 PROCEDURE — 36415 COLL VENOUS BLD VENIPUNCTURE: CPT

## 2021-04-21 PROCEDURE — 80053 COMPREHEN METABOLIC PANEL: CPT

## 2021-04-21 PROCEDURE — 84478 ASSAY OF TRIGLYCERIDES: CPT

## 2021-04-21 PROCEDURE — 82043 UR ALBUMIN QUANTITATIVE: CPT | Performed by: INTERNAL MEDICINE

## 2021-04-21 PROCEDURE — G0103 PSA SCREENING: HCPCS

## 2021-04-21 PROCEDURE — 85025 COMPLETE CBC W/AUTO DIFF WBC: CPT

## 2021-04-21 PROCEDURE — 82570 ASSAY OF URINE CREATININE: CPT | Performed by: INTERNAL MEDICINE

## 2021-04-21 PROCEDURE — 83721 ASSAY OF BLOOD LIPOPROTEIN: CPT

## 2021-04-21 PROCEDURE — 84443 ASSAY THYROID STIM HORMONE: CPT

## 2021-04-21 RX ORDER — MONTELUKAST SODIUM 10 MG/1
10 TABLET ORAL
Qty: 30 TABLET | Refills: 5 | OUTPATIENT
Start: 2021-04-21 | End: 2022-01-11

## 2021-06-28 ENCOUNTER — OFFICE VISIT (OUTPATIENT)
Dept: URGENT CARE | Facility: CLINIC | Age: 66
End: 2021-06-28
Payer: MEDICARE

## 2021-06-28 VITALS
TEMPERATURE: 98.6 F | OXYGEN SATURATION: 97 % | WEIGHT: 179 LBS | RESPIRATION RATE: 20 BRPM | HEART RATE: 76 BPM | BODY MASS INDEX: 27.22 KG/M2

## 2021-06-28 DIAGNOSIS — R53.83 FATIGUE, UNSPECIFIED TYPE: Primary | ICD-10-CM

## 2021-06-28 DIAGNOSIS — J34.89 RHINORRHEA: ICD-10-CM

## 2021-06-28 PROCEDURE — 99212 OFFICE O/P EST SF 10 MIN: CPT | Performed by: PHYSICIAN ASSISTANT

## 2021-06-28 PROCEDURE — U0005 INFEC AGEN DETEC AMPLI PROBE: HCPCS | Performed by: PHYSICIAN ASSISTANT

## 2021-06-28 PROCEDURE — U0003 INFECTIOUS AGENT DETECTION BY NUCLEIC ACID (DNA OR RNA); SEVERE ACUTE RESPIRATORY SYNDROME CORONAVIRUS 2 (SARS-COV-2) (CORONAVIRUS DISEASE [COVID-19]), AMPLIFIED PROBE TECHNIQUE, MAKING USE OF HIGH THROUGHPUT TECHNOLOGIES AS DESCRIBED BY CMS-2020-01-R: HCPCS | Performed by: PHYSICIAN ASSISTANT

## 2021-06-28 PROCEDURE — G0463 HOSPITAL OUTPT CLINIC VISIT: HCPCS | Performed by: PHYSICIAN ASSISTANT

## 2021-06-28 NOTE — PROGRESS NOTES
[facility]        NAME: Fe Dillon is a 72 y o  male  : 1955    MRN: [id number]  DATE: 2021  TIME: 11:32 AM    Assessment and Plan   Fatigue, unspecified type [R53 83]  1  Fatigue, unspecified type  Novel Coronavirus (Covid-19),PCR [facility] - Office Collection   2  Rhinorrhea  Novel Coronavirus (Covid-19),PCR [facility] - Office Collection         Patient Instructions       Follow up with PCP in 3-5 days  Proceed to  ER if symptoms worsen  Chief Complaint     Chief Complaint   Patient presents with    Nasal Congestion     x 4 days          History of Present Illness        Patient presents with a four-day history of fatigue, mild headache, nasal congestion and runny nose  Denies any fever, chills, cough, shortness of breath, nausea, vomiting,  diarrhea, body aches or dizziness  No known exposure to COVID-19 and he is fully vaccinated  Patient recently traveled to [place] and returned a few days ago  While in [place] he did sustain a bug bite to his anterior chest  Believes his symptoms started after he was bitten by the insect  Review of Systems   Review of Systems   Constitutional: Positive for fatigue  Negative for chills and fever  HENT: Positive for congestion, rhinorrhea and sore throat  Respiratory: Negative for cough and shortness of breath  Gastrointestinal: Negative for diarrhea, nausea and vomiting  Musculoskeletal: Negative for myalgias  Neurological: Positive for headaches  Negative for dizziness           Current Medications       Current Outpatient Medications:     Bevacizumab (AVASTIN IV), Administer to both eyes q4 weeks, Disp: , Rfl:     montelukast (SINGULAIR) 10 mg tablet, Take 1 tablet (10 mg total) by mouth daily at bedtime, Disp: 30 tablet, Rfl: 5    sertraline (ZOLOFT) 50 mg tablet, Take 1 tablet (50 mg total) by mouth daily, Disp: 90 tablet, Rfl: 1    b complex vitamins capsule, Take 1 capsule by mouth daily (Patient not taking: Reported on 6/28/2021), Disp: , Rfl:     hydrocortisone (ANUSOL-HC) 25 mg suppository, Insert 1 suppository (25 mg total) into the rectum 2 (two) times a day (Patient not taking: Reported on 3/9/2021), Disp: 28 suppository, Rfl: 1    ofloxacin (OCUFLOX) 0 3 % ophthalmic solution, , Disp: , Rfl:     prednisoLONE acetate (PRED FORTE) 1 % ophthalmic suspension, , Disp: , Rfl:     Current Allergies     Allergies as of 06/28/2021 - Reviewed 06/28/2021   Allergen Reaction Noted    Prednisone  06/24/2020            The following portions of the patient's history were reviewed and updated as appropriate: allergies, current medications, past family history, past medical history, past social history, past surgical history and problem list      Past Medical History:   Diagnosis Date    Abnormal eye finding     last assessed 08/18/2015    Anemia     Atrial fibrillation ([address] )     Generalized anxiety disorder     History of kidney cancer     Hyperlipidemia     no longer    Hypertension     no longer       Past Surgical History:   Procedure Laterality Date    COLONOSCOPY N/A 3/10/2017    Procedure: COLONOSCOPY;  Surgeon: Jovani Joseph MD;  Location: [facility];  Service:    [facility] EXTERNAL EAR SURGERY      KIDNEY SURGERY      TIBIA FRACTURE SURGERY      TONSILLECTOMY  childhood       Family History   Problem Relation Age of Onset    No Known Problems Mother     Cancer Paternal Uncle          Medications have been verified  Objective   Pulse 76   Temp 98 6 °F (37 °C)   Resp 20   Wt 81 2 kg (179 lb)   SpO2 97%   BMI 27 22 kg/m²   No LMP for male patient  Physical Exam     Physical Exam  Vitals and nursing note reviewed  Constitutional:       Appearance: Normal appearance  HENT:      Head: Normocephalic and atraumatic  Mouth/Throat:      Mouth: Mucous membranes are moist       Pharynx: Oropharynx is clear     Eyes:      Conjunctiva/sclera: Conjunctivae normal    Cardiovascular:      Rate and Rhythm: Normal rate and regular rhythm  Heart sounds: Normal heart sounds  Pulmonary:      Effort: Pulmonary effort is normal       Breath sounds: Normal breath sounds  Skin:     General: Skin is warm  Neurological:      Mental Status: He is alert

## 2021-06-28 NOTE — PATIENT INSTRUCTIONS

## 2021-06-29 LAB — SARS-COV-2 RNA RESP QL NAA+PROBE: NEGATIVE

## 2021-08-16 ENCOUNTER — RA CDI HCC (OUTPATIENT)
Dept: OTHER | Facility: HOSPITAL | Age: 66
End: 2021-08-16

## 2021-08-16 NOTE — PROGRESS NOTES
[facility]  coding opportunities       Chart reviewed, no opportunity found: CHART REVIEWED, NO OPPORTUNITY FOUND                        Patients insurance company: [facility] [address]  (Medicare Advantage and Commercial)

## 2021-08-20 ENCOUNTER — OFFICE VISIT (OUTPATIENT)
Dept: INTERNAL MEDICINE CLINIC | Facility: CLINIC | Age: 66
End: 2021-08-20
Payer: MEDICARE

## 2021-08-20 VITALS
HEART RATE: 64 BPM | HEIGHT: 68 IN | RESPIRATION RATE: 14 BRPM | DIASTOLIC BLOOD PRESSURE: 80 MMHG | BODY MASS INDEX: 27.13 KG/M2 | OXYGEN SATURATION: 98 % | TEMPERATURE: 98.6 F | WEIGHT: 179 LBS | SYSTOLIC BLOOD PRESSURE: 114 MMHG

## 2021-08-20 DIAGNOSIS — E03.9 ACQUIRED HYPOTHYROIDISM: ICD-10-CM

## 2021-08-20 DIAGNOSIS — L98.9 SKIN LESION: ICD-10-CM

## 2021-08-20 DIAGNOSIS — K80.80 BILIARY CALCULUS OF OTHER SITE WITHOUT OBSTRUCTION: ICD-10-CM

## 2021-08-20 DIAGNOSIS — Z13.31 NEGATIVE DEPRESSION SCREENING: ICD-10-CM

## 2021-08-20 DIAGNOSIS — I48.0 PAROXYSMAL ATRIAL FIBRILLATION (HCC): ICD-10-CM

## 2021-08-20 DIAGNOSIS — I10 BENIGN ESSENTIAL HYPERTENSION: Primary | ICD-10-CM

## 2021-08-20 DIAGNOSIS — M15.9 GENERALIZED OSTEOARTHRITIS: ICD-10-CM

## 2021-08-20 DIAGNOSIS — D50.8 IRON DEFICIENCY ANEMIA SECONDARY TO INADEQUATE DIETARY IRON INTAKE: ICD-10-CM

## 2021-08-20 DIAGNOSIS — H35.81 MACULAR EDEMA: ICD-10-CM

## 2021-08-20 DIAGNOSIS — E78.2 MIXED HYPERLIPIDEMIA: ICD-10-CM

## 2021-08-20 DIAGNOSIS — F41.1 GENERALIZED ANXIETY DISORDER: ICD-10-CM

## 2021-08-20 DIAGNOSIS — C64.9 MALIGNANT NEOPLASM OF KIDNEY, UNSPECIFIED LATERALITY (HCC): ICD-10-CM

## 2021-08-20 PROBLEM — K80.20 CHOLELITHIASIS: Status: ACTIVE | Noted: 2021-08-20

## 2021-08-20 PROCEDURE — 99214 OFFICE O/P EST MOD 30 MIN: CPT | Performed by: INTERNAL MEDICINE

## 2021-08-20 RX ORDER — AMOXICILLIN 500 MG/1
CAPSULE ORAL
COMMUNITY
Start: 2021-08-17 | End: 2022-01-11

## 2021-08-20 RX ORDER — OXYCODONE HYDROCHLORIDE AND ACETAMINOPHEN 5; 325 MG/1; MG/1
TABLET ORAL 4 TIMES DAILY PRN
COMMUNITY
Start: 2021-08-17 | End: 2022-01-11

## 2021-08-20 NOTE — PATIENT INSTRUCTIONS
Chronic Hypertension   AMBULATORY CARE:   Hypertension  is high blood pressure  Your blood pressure is the force of your blood moving against the walls of your arteries  Hypertension causes your blood pressure to get so high that your heart has to work much harder than normal  This can damage your heart  Even if you have hypertension for years, lifestyle changes, medicines, or both can help bring your blood pressure to normal   Call your local emergency number (911 in the [address] [facility]) or have someone call if:   · You have chest pain  · You have any of the following signs of a heart attack:      ? Squeezing, pressure, or pain in your chest    ? You may  also have any of the following:     § Discomfort or pain in your back, neck, jaw, stomach, or arm    § Shortness of breath    § Nausea or vomiting    § Lightheadedness or a sudden cold sweat    · You become confused or have difficulty speaking  · You suddenly feel lightheaded or have trouble breathing  Seek care immediately if:   · You have a severe headache or vision loss  · You have weakness in an arm or leg  Call your doctor if:   · You feel faint, dizzy, confused, or drowsy  · You have been taking your blood pressure medicine but your pressure is higher than your provider says it should be  · You have questions or concerns about your condition or care  Treatment for chronic hypertension  may include medicine to lower your blood pressure and cholesterol levels  A low cholesterol level helps prevent heart disease and makes it easier to control your blood pressure  Heart disease can make your blood pressure harder to control  You may also need to make lifestyle changes  What you need to know about the stages of hypertension:       · Normal blood pressure is 119/79 or lower   Your healthcare provider may only check your blood pressure each year if it stays at a normal level  · Elevated blood pressure is 120/79 to 129/79    This is sometimes called prehypertension  Your healthcare provider may suggest lifestyle changes to help lower your blood pressure to a normal level  He or she may then check it again in 3 to 6 months  · Stage 1 hypertension is 130/80  to 139/89   Your provider may recommend lifestyle changes, medication, and checks every 3 to 6 months until your blood pressure is controlled  · Stage 2 hypertension is 140/90 or higher   Your provider will recommend lifestyle changes and have you take 2 kinds of hypertension medicines  You will also need to have your blood pressure checked monthly until it is controlled  Manage chronic hypertension:   · Check your blood pressure at home  Avoid smoking, caffeine, and exercise at least 30 minutes before checking your blood pressure  Sit and rest for 5 minutes before you take your blood pressure  Extend your arm and support it on a flat surface  Your arm should be at the same level as your heart  Follow the directions that came with your blood pressure monitor  Check your blood pressure 2 times, 1 minute apart, before you take your medicine in the morning  Also check your blood pressure before your evening meal  Keep a record of your readings and bring it to your follow-up visits  Ask your healthcare provider what your blood pressure should be  · Manage any other health conditions you have  Health conditions such as diabetes can increase your risk for hypertension  Follow your healthcare provider's instructions and take all your medicines as directed  Talk to your healthcare provider about any new health conditions you have recently developed  · Ask about all medicines  Certain medicines can increase your blood pressure  Examples include oral birth control pills, decongestants, herbal supplements, and NSAIDs, such as ibuprofen  Your healthcare provider can tell you which medicines are safe for you to take  This includes prescription and over-the-counter medicines      Lifestyle changes you can make to lower your blood pressure: Your provider may want you to make more lifestyle changes if you are having trouble controlling your blood pressure  This may feel difficult over time, especially if you think you are making good changes but your pressure is still high  It might help to focus on one new change at a time  For example, try to add 1 more day of exercise, or exercise for an extra 10 minutes on 2 days  Small changes can make a big difference  Your healthcare provider can also refer you to specialists such as a dietitian who can help you make small changes  Your family members may be included in helping you learn to create lifestyle changes, such as the following:  · Limit sodium (salt) as directed  Too much sodium can affect your fluid balance  Check labels to find low-sodium or no-salt-added foods  Some low-sodium foods use potassium salts for flavor  Too much potassium can also cause health problems  Your healthcare provider will tell you how much sodium and potassium are safe for you to have in a day  He or she may recommend that you limit sodium to 2,300 mg a day  · Follow the meal plan recommended by your healthcare provider  A dietitian or your provider can give you more information on low-sodium plans or the DASH (Dietary Approaches to Stop Hypertension) eating plan  The DASH plan is low in sodium, processed sugar, unhealthy fats, and total fat  It is high in potassium, calcium, and fiber  These can be found in vegetables, fruit, and whole-grain foods  · Be physically active throughout the day  Physical activity, such as exercise, can help control your blood pressure and your weight  Be physically active for at least 30 minutes per day, on most days of the week  Include aerobic activity, such as walking or riding a bicycle  Also include strength training at least 2 times each week  Your healthcare providers can help you create a physical activity plan  · Decrease stress  This may help lower your blood pressure  Learn ways to relax, such as deep breathing or listening to music  · Limit alcohol as directed  Alcohol can increase your blood pressure  A drink of alcohol is 12 ounces of beer, 5 ounces of wine, or 1½ ounces of liquor  · Do not smoke  Nicotine and other chemicals in cigarettes and cigars can increase your blood pressure and also cause lung damage  Ask your healthcare provider for information if you currently smoke and need help to quit  E-cigarettes or smokeless tobacco still contain nicotine  Talk to your healthcare provider before you use these products  Follow up with your doctor as directed: You will need to return to have your blood pressure checked and to have other lab tests done  Write down your questions so you remember to ask them during your visits  © Copyright [employer] 2021 Information is for End User's use only and may not be sold, redistributed or otherwise used for commercial purposes  All illustrations and images included in CareNotes® are the copyrighted property of A D A M , Inc  or [facility] Becca Arnold   The above information is an  only  It is not intended as medical advice for individual conditions or treatments  Talk to your doctor, nurse or pharmacist before following any medical regimen to see if it is safe and effective for you

## 2021-08-20 NOTE — PROGRESS NOTES
Assessment/Plan:  Problem List Items Addressed This Visit        Digestive    Cholelithiasis       Endocrine    Hypothyroidism       Cardiovascular and Mediastinum    Paroxysmal atrial fibrillation (HCC)    Benign essential hypertension - Primary    Relevant Orders    Comprehensive metabolic panel    Microalbumin / creatinine urine ratio       Musculoskeletal and Integument    Generalized osteoarthritis       Genitourinary    Malignant neoplasm of kidney (HCC)    Relevant Medications    Bevacizumab (AVASTIN IV)    Other Relevant Orders    CBC and differential       Other    Macular edema    Iron deficiency anemia secondary to inadequate dietary iron intake    Hyperlipidemia    Relevant Orders    Lipid Panel with Direct LDL reflex    Generalized anxiety disorder      Other Visit Diagnoses     Negative depression screening        Skin lesion        Relevant Orders    Ambulatory referral to Dermatology           Diagnoses and all orders for this visit:    Benign essential hypertension  -     Comprehensive metabolic panel; Future  -     Microalbumin / creatinine urine ratio    Paroxysmal atrial fibrillation (HCC)    Acquired hypothyroidism    Generalized osteoarthritis    Malignant neoplasm of kidney, unspecified laterality (HCC)  -     CBC and differential; Future    Generalized anxiety disorder    Mixed hyperlipidemia  -     Lipid Panel with Direct LDL reflex; Future    Iron deficiency anemia secondary to inadequate dietary iron intake    Macular edema    Negative depression screening    Biliary calculus of other site without obstruction    Skin lesion  -     Ambulatory referral to Dermatology; Future    Other orders  -     oxyCODONE-acetaminophen (PERCOCET) 5-325 mg per tablet; 4 (four) times a day as needed  -     amoxicillin (AMOXIL) 500 mg capsule  -     Bevacizumab (AVASTIN IV); Infuse into a venous catheter        No problem-specific Assessment & Plan notes found for this encounter      A/P: Doing well and will check labs  Appreciate [facility] input  Suspect abdominal discomfort was due to constipation from the narc  Doing better and will monitor  ?skin lesion  Looks like a SK that's irritated, but reports It just appeared  Will refer to derm for a second opinion  Continue current treatment and RTC four months for routine  Keep f/u with specialists  Subjective:      Patient ID: Dejuan Moreau is a 72 y o  male  WM RTC for f/u HTN, hypothyroidism, etc  Doing well and no new issues except for a skin lesion he wants checked  Just appeared  Uncertain of any trauma  Had some stomach pain, but was after taking some narcs after dental sx  Remains active w/o difficulty and no falls  Renal cancer with mets is good and just had a f/u CT and appt  CT with gallstones  Vision no worse  EMBER is controlled  Due for labs  The following portions of the patient's history were reviewed and updated as appropriate:   He has a past medical history of Abnormal eye finding, Anemia, Atrial fibrillation ([address] ), Generalized anxiety disorder, History of kidney cancer, Hyperlipidemia, and Hypertension  ,  does not have any pertinent problems on file  ,   has a past surgical history that includes External ear surgery; Kidney surgery; Tibia fracture surgery; Colonoscopy (N/A, 3/10/2017); Tonsillectomy (childhood); and Tooth extraction (08/11/2021)  ,  family history includes Cancer in his paternal uncle; No Known Problems in his mother  ,   reports that he quit smoking about 22 years ago  His smoking use included cigarettes  He has never used smokeless tobacco  He reports that he does not drink alcohol and does not use drugs  ,  is allergic to prednisone     Current Outpatient Medications   Medication Sig Dispense Refill    amoxicillin (AMOXIL) 500 mg capsule       Bevacizumab (AVASTIN IV) Infuse into a venous catheter      oxyCODONE-acetaminophen (PERCOCET) 5-325 mg per tablet 4 (four) times a day as needed      sertraline (ZOLOFT) 50 mg tablet Take 1 tablet (50 mg total) by mouth daily 90 tablet 1    hydrocortisone (ANUSOL-HC) 25 mg suppository Insert 1 suppository (25 mg total) into the rectum 2 (two) times a day (Patient not taking: Reported on 3/9/2021) 28 suppository 1    montelukast (SINGULAIR) 10 mg tablet Take 1 tablet (10 mg total) by mouth daily at bedtime (Patient not taking: Reported on 7/15/2021) 30 tablet 5    ofloxacin (OCUFLOX) 0 3 % ophthalmic solution  (Patient not taking: Reported on 6/28/2021)      prednisoLONE acetate (PRED FORTE) 1 % ophthalmic suspension  (Patient not taking: Reported on 6/28/2021)       No current facility-administered medications for this visit  Review of Systems   Constitutional: Negative for activity change, chills, diaphoresis, fatigue and fever  HENT: Negative  Eyes: Positive for visual disturbance  Respiratory: Negative for cough, chest tightness, shortness of breath and wheezing  Cardiovascular: Negative for chest pain, palpitations and leg swelling  Gastrointestinal: Negative for abdominal pain, constipation, diarrhea, nausea and vomiting  Endocrine: Negative for cold intolerance and heat intolerance  Genitourinary: Negative for difficulty urinating, dysuria and frequency  Musculoskeletal: Negative for arthralgias, gait problem and myalgias  Skin:        ACW lesion  Neurological: Negative for dizziness, seizures, syncope, weakness, light-headedness and headaches  Psychiatric/Behavioral: Negative for confusion, dysphoric mood and sleep disturbance  The patient is not nervous/anxious          PHQ-9 Depression Screening    PHQ-9:   Frequency of the following problems over the past two weeks:      Little interest or pleasure in doing things: 0 - not at all  Feeling down, depressed, or hopeless: 0 - not at all  PHQ-2 Score: 0        Objective:  Vitals:    08/20/21 1035   BP: 114/80   BP Location: Left arm   Patient Position: Sitting   Cuff Size: Standard   Pulse: 64   Resp: 14 Temp: 98 6 °F (37 °C)   SpO2: 98%   Weight: 81 2 kg (179 lb)   Height: 5' 8" (1 727 m)     Body mass index is 27 22 kg/m²  Physical Exam  Vitals and nursing note reviewed  Constitutional:       General: He is not in acute distress  Appearance: Normal appearance  He is not ill-appearing  HENT:      Head: Normocephalic and atraumatic  Mouth/Throat:      Mouth: Mucous membranes are moist    Eyes:      Extraocular Movements: Extraocular movements intact  Conjunctiva/sclera: Conjunctivae normal       Pupils: Pupils are equal, round, and reactive to light  Neck:      Vascular: No carotid bruit  Cardiovascular:      Rate and Rhythm: Normal rate and regular rhythm  Heart sounds: Normal heart sounds  Pulmonary:      Effort: Pulmonary effort is normal  No respiratory distress  Breath sounds: Normal breath sounds  No wheezing or rales  Abdominal:      General: Bowel sounds are normal  There is no distension  Palpations: Abdomen is soft  Tenderness: There is no abdominal tenderness  Musculoskeletal:      Cervical back: Neck supple  Right lower leg: No edema  Left lower leg: No edema  Skin:     Comments: Left ACW with an erythematous, stuck on appearing irreugalar border lesion  Neurological:      General: No focal deficit present  Mental Status: He is alert and oriented to person, place, and time  Mental status is at baseline  Psychiatric:         Mood and Affect: Mood normal          Behavior: Behavior normal          Thought Content:  Thought content normal          Judgment: Judgment normal

## 2021-09-01 ENCOUNTER — APPOINTMENT (OUTPATIENT)
Dept: LAB | Facility: CLINIC | Age: 66
End: 2021-09-01
Payer: MEDICARE

## 2021-09-01 DIAGNOSIS — I10 BENIGN ESSENTIAL HYPERTENSION: ICD-10-CM

## 2021-09-01 DIAGNOSIS — C64.9 MALIGNANT NEOPLASM OF KIDNEY, UNSPECIFIED LATERALITY (HCC): ICD-10-CM

## 2021-09-01 DIAGNOSIS — E78.2 MIXED HYPERLIPIDEMIA: ICD-10-CM

## 2021-09-01 LAB
ALBUMIN SERPL BCP-MCNC: 4 G/DL (ref 3.5–5)
ALP SERPL-CCNC: 91 U/L (ref 46–116)
ALT SERPL W P-5'-P-CCNC: 35 U/L (ref 12–78)
ANION GAP SERPL CALCULATED.3IONS-SCNC: 3 MMOL/L (ref 4–13)
AST SERPL W P-5'-P-CCNC: 19 U/L (ref 5–45)
BASOPHILS # BLD AUTO: 0.03 THOUSANDS/ΜL (ref 0–0.1)
BASOPHILS NFR BLD AUTO: 1 % (ref 0–1)
BILIRUB SERPL-MCNC: 0.65 MG/DL (ref 0.2–1)
BUN SERPL-MCNC: 23 MG/DL (ref 5–25)
CALCIUM SERPL-MCNC: 9.8 MG/DL (ref 8.3–10.1)
CHLORIDE SERPL-SCNC: 104 MMOL/L (ref 100–108)
CHOLEST SERPL-MCNC: 170 MG/DL (ref 50–200)
CO2 SERPL-SCNC: 31 MMOL/L (ref 21–32)
CREAT SERPL-MCNC: 1.06 MG/DL (ref 0.6–1.3)
CREAT UR-MCNC: 72.6 MG/DL
EOSINOPHIL # BLD AUTO: 0.09 THOUSAND/ΜL (ref 0–0.61)
EOSINOPHIL NFR BLD AUTO: 2 % (ref 0–6)
ERYTHROCYTE [DISTWIDTH] IN BLOOD BY AUTOMATED COUNT: 12.3 % (ref 11.6–15.1)
GFR SERPL CREATININE-BSD FRML MDRD: 73 ML/MIN/1.73SQ M
GLUCOSE P FAST SERPL-MCNC: 83 MG/DL (ref 65–99)
HCT VFR BLD AUTO: 42.8 % (ref 36.5–49.3)
HDLC SERPL-MCNC: 46 MG/DL
HGB BLD-MCNC: 13.9 G/DL (ref 12–17)
IMM GRANULOCYTES # BLD AUTO: 0.01 THOUSAND/UL (ref 0–0.2)
IMM GRANULOCYTES NFR BLD AUTO: 0 % (ref 0–2)
LDLC SERPL CALC-MCNC: 104 MG/DL (ref 0–100)
LYMPHOCYTES # BLD AUTO: 1.14 THOUSANDS/ΜL (ref 0.6–4.47)
LYMPHOCYTES NFR BLD AUTO: 20 % (ref 14–44)
MCH RBC QN AUTO: 29.6 PG (ref 26.8–34.3)
MCHC RBC AUTO-ENTMCNC: 32.5 G/DL (ref 31.4–37.4)
MCV RBC AUTO: 91 FL (ref 82–98)
MICROALBUMIN UR-MCNC: <5 MG/L (ref 0–20)
MICROALBUMIN/CREAT 24H UR: <7 MG/G CREATININE (ref 0–30)
MONOCYTES # BLD AUTO: 0.64 THOUSAND/ΜL (ref 0.17–1.22)
MONOCYTES NFR BLD AUTO: 11 % (ref 4–12)
NEUTROPHILS # BLD AUTO: 3.86 THOUSANDS/ΜL (ref 1.85–7.62)
NEUTS SEG NFR BLD AUTO: 66 % (ref 43–75)
NRBC BLD AUTO-RTO: 0 /100 WBCS
PLATELET # BLD AUTO: 198 THOUSANDS/UL (ref 149–390)
PMV BLD AUTO: 10.5 FL (ref 8.9–12.7)
POTASSIUM SERPL-SCNC: 4.8 MMOL/L (ref 3.5–5.3)
PROT SERPL-MCNC: 7.4 G/DL (ref 6.4–8.2)
RBC # BLD AUTO: 4.69 MILLION/UL (ref 3.88–5.62)
SODIUM SERPL-SCNC: 138 MMOL/L (ref 136–145)
TRIGL SERPL-MCNC: 99 MG/DL
WBC # BLD AUTO: 5.77 THOUSAND/UL (ref 4.31–10.16)

## 2021-09-01 PROCEDURE — 82570 ASSAY OF URINE CREATININE: CPT | Performed by: INTERNAL MEDICINE

## 2021-09-01 PROCEDURE — 82043 UR ALBUMIN QUANTITATIVE: CPT | Performed by: INTERNAL MEDICINE

## 2021-09-01 PROCEDURE — 85025 COMPLETE CBC W/AUTO DIFF WBC: CPT

## 2021-09-01 PROCEDURE — 80053 COMPREHEN METABOLIC PANEL: CPT

## 2021-09-01 PROCEDURE — 36415 COLL VENOUS BLD VENIPUNCTURE: CPT

## 2021-09-01 PROCEDURE — 80061 LIPID PANEL: CPT

## 2021-09-30 ENCOUNTER — IMMUNIZATIONS (OUTPATIENT)
Dept: INTERNAL MEDICINE CLINIC | Facility: CLINIC | Age: 66
End: 2021-09-30
Payer: MEDICARE

## 2021-09-30 DIAGNOSIS — Z23 NEEDS FLU SHOT: Primary | ICD-10-CM

## 2021-09-30 PROCEDURE — G0008 ADMIN INFLUENZA VIRUS VAC: HCPCS

## 2021-09-30 PROCEDURE — 90662 IIV NO PRSV INCREASED AG IM: CPT

## 2021-11-03 DIAGNOSIS — F41.9 ANXIETY: ICD-10-CM

## 2021-12-20 ENCOUNTER — OFFICE VISIT (OUTPATIENT)
Dept: INTERNAL MEDICINE CLINIC | Facility: CLINIC | Age: 66
End: 2021-12-20
Payer: MEDICARE

## 2021-12-20 VITALS
BODY MASS INDEX: 26.98 KG/M2 | HEART RATE: 84 BPM | WEIGHT: 178 LBS | OXYGEN SATURATION: 94 % | HEIGHT: 68 IN | DIASTOLIC BLOOD PRESSURE: 74 MMHG | SYSTOLIC BLOOD PRESSURE: 136 MMHG | TEMPERATURE: 99.4 F

## 2021-12-20 DIAGNOSIS — R35.1 NOCTURIA: ICD-10-CM

## 2021-12-20 DIAGNOSIS — I48.0 PAROXYSMAL ATRIAL FIBRILLATION (HCC): ICD-10-CM

## 2021-12-20 DIAGNOSIS — E78.2 MIXED HYPERLIPIDEMIA: ICD-10-CM

## 2021-12-20 DIAGNOSIS — E03.9 ACQUIRED HYPOTHYROIDISM: ICD-10-CM

## 2021-12-20 DIAGNOSIS — M15.9 GENERALIZED OSTEOARTHRITIS: ICD-10-CM

## 2021-12-20 DIAGNOSIS — F41.1 GENERALIZED ANXIETY DISORDER: ICD-10-CM

## 2021-12-20 DIAGNOSIS — I10 BENIGN ESSENTIAL HYPERTENSION: Primary | ICD-10-CM

## 2021-12-20 DIAGNOSIS — Z23 ENCOUNTER FOR VACCINATION: ICD-10-CM

## 2021-12-20 DIAGNOSIS — D50.8 IRON DEFICIENCY ANEMIA SECONDARY TO INADEQUATE DIETARY IRON INTAKE: ICD-10-CM

## 2021-12-20 DIAGNOSIS — C64.9 MALIGNANT NEOPLASM OF KIDNEY, UNSPECIFIED LATERALITY (HCC): ICD-10-CM

## 2021-12-20 PROCEDURE — 99214 OFFICE O/P EST MOD 30 MIN: CPT | Performed by: INTERNAL MEDICINE

## 2022-01-10 ENCOUNTER — APPOINTMENT (OUTPATIENT)
Dept: NON INVASIVE DIAGNOSTICS | Facility: HOSPITAL | Age: 67
End: 2022-01-10
Payer: MEDICARE

## 2022-01-10 ENCOUNTER — APPOINTMENT (EMERGENCY)
Dept: CT IMAGING | Facility: HOSPITAL | Age: 67
End: 2022-01-10
Payer: MEDICARE

## 2022-01-10 ENCOUNTER — HOSPITAL ENCOUNTER (OUTPATIENT)
Facility: HOSPITAL | Age: 67
Setting detail: OBSERVATION
Discharge: HOME | End: 2022-01-11
Attending: EMERGENCY MEDICINE | Admitting: STUDENT IN AN ORGANIZED HEALTH CARE EDUCATION/TRAINING PROGRAM
Payer: MEDICARE

## 2022-01-10 ENCOUNTER — APPOINTMENT (OUTPATIENT)
Dept: MRI IMAGING | Facility: HOSPITAL | Age: 67
End: 2022-01-10
Payer: MEDICARE

## 2022-01-10 DIAGNOSIS — R29.90 STROKE-LIKE SYMPTOMS: Primary | ICD-10-CM

## 2022-01-10 LAB
2HR DELTA HS TROPONIN: -1 NG/L
4HR DELTA HS TROPONIN: -1 NG/L
ANION GAP SERPL CALCULATED.3IONS-SCNC: 5 MMOL/L (ref 4–13)
AORTIC ROOT: 3.4 CM
AORTIC VALVE MEAN VELOCITY: 7 M/S
APICAL FOUR CHAMBER EJECTION FRACTION: 63 %
APTT PPP: 38 SECONDS (ref 23–37)
AV LVOT MEAN GRADIENT: 1 MMHG
AV LVOT PEAK GRADIENT: 2 MMHG
AV MEAN GRADIENT: 2 MMHG
AV PEAK GRADIENT: 4 MMHG
AV VELOCITY RATIO: 0.07
BUN SERPL-MCNC: 24 MG/DL (ref 5–25)
CALCIUM SERPL-MCNC: 10.1 MG/DL (ref 8.4–10.2)
CARDIAC TROPONIN I PNL SERPL HS: 3 NG/L
CARDIAC TROPONIN I PNL SERPL HS: 3 NG/L
CARDIAC TROPONIN I PNL SERPL HS: 4 NG/L
CHLORIDE SERPL-SCNC: 102 MMOL/L (ref 96–108)
CO2 SERPL-SCNC: 33 MMOL/L (ref 21–32)
CREAT SERPL-MCNC: 1 MG/DL (ref 0.6–1.3)
DOP CALC AO PEAK VEL: 10.4 M/S
DOP CALC AO VTI: 24.66 CM
DOP CALC LVOT PEAK VEL VTI: 18.9 CM
DOP CALC LVOT PEAK VEL: 0.75 M/S
E WAVE DECELERATION TIME: 297 MS
ERYTHROCYTE [DISTWIDTH] IN BLOOD BY AUTOMATED COUNT: 12.3 % (ref 11.6–15.1)
FLUAV RNA RESP QL NAA+PROBE: NEGATIVE
FLUBV RNA RESP QL NAA+PROBE: NEGATIVE
FRACTIONAL SHORTENING: 59 % (ref 28–44)
GFR SERPL CREATININE-BSD FRML MDRD: 78 ML/MIN/1.73SQ M
GLUCOSE SERPL-MCNC: 83 MG/DL (ref 65–140)
HCT VFR BLD AUTO: 43.6 % (ref 36.5–49.3)
HGB BLD-MCNC: 14 G/DL (ref 12–17)
INR PPP: 0.98 (ref 0.84–1.19)
INTERVENTRICULAR SEPTUM IN DIASTOLE (PARASTERNAL SHORT AXIS VIEW): 0.9 CM
LEFT ATRIUM AREA SYSTOLE SINGLE PLANE A4C: 10.1 CM2
LEFT ATRIUM SIZE: 3.1 CM
LEFT INTERNAL DIMENSION IN SYSTOLE: 1.9 CM (ref 2.1–4)
LEFT VENTRICULAR INTERNAL DIMENSION IN DIASTOLE: 4.6 CM (ref 4.78–7.12)
LEFT VENTRICULAR POSTERIOR WALL IN END DIASTOLE: 0.7 CM
LEFT VENTRICULAR STROKE VOLUME: 88 ML
MCH RBC QN AUTO: 29.3 PG (ref 26.8–34.3)
MCHC RBC AUTO-ENTMCNC: 32.1 G/DL (ref 31.4–37.4)
MCV RBC AUTO: 91 FL (ref 82–98)
MV E'TISSUE VEL-SEP: 11 CM/S
MV PEAK A VEL: 0.61 M/S
MV PEAK E VEL: 37 CM/S
MV STENOSIS PRESSURE HALF TIME: 0 MS
PLATELET # BLD AUTO: 165 THOUSANDS/UL (ref 149–390)
PLATELET # BLD AUTO: 194 THOUSANDS/UL (ref 149–390)
PMV BLD AUTO: 10.3 FL (ref 8.9–12.7)
PMV BLD AUTO: 9.6 FL (ref 8.9–12.7)
POTASSIUM SERPL-SCNC: 4.3 MMOL/L (ref 3.5–5.3)
PROTHROMBIN TIME: 12.9 SECONDS (ref 11.6–14.5)
RBC # BLD AUTO: 4.78 MILLION/UL (ref 3.88–5.62)
RIGHT ATRIUM AREA SYSTOLE A4C: 13.9 CM2
RIGHT VENTRICLE ID DIMENSION: 3 CM
RSV RNA RESP QL NAA+PROBE: NEGATIVE
SARS-COV-2 RNA RESP QL NAA+PROBE: NEGATIVE
SL CV LV EF: 60
SL CV PED ECHO LEFT VENTRICLE DIASTOLIC VOLUME (MOD BIPLANE) 2D: 98 ML
SL CV PED ECHO LEFT VENTRICLE SYSTOLIC VOLUME (MOD BIPLANE) 2D: 10 ML
SODIUM SERPL-SCNC: 140 MMOL/L (ref 135–147)
TRICUSPID VALVE PEAK REGURGITATION VELOCITY: 1.97 M/S
TSH SERPL DL<=0.05 MIU/L-ACNC: 1.25 UIU/ML (ref 0.45–5.33)
TV PEAK GRADIENT: 15 MMHG
WBC # BLD AUTO: 5.55 THOUSAND/UL (ref 4.31–10.16)
Z-SCORE OF LEFT VENTRICULAR DIMENSION IN END SYSTOLE: -2.37

## 2022-01-10 PROCEDURE — 0241U HB NFCT DS VIR RESP RNA 4 TRGT: CPT | Performed by: EMERGENCY MEDICINE

## 2022-01-10 PROCEDURE — 99219 PR INITIAL OBSERVATION CARE/DAY 50 MINUTES: CPT | Performed by: STUDENT IN AN ORGANIZED HEALTH CARE EDUCATION/TRAINING PROGRAM

## 2022-01-10 PROCEDURE — 97166 OT EVAL MOD COMPLEX 45 MIN: CPT

## 2022-01-10 PROCEDURE — 93306 TTE W/DOPPLER COMPLETE: CPT

## 2022-01-10 PROCEDURE — 36415 COLL VENOUS BLD VENIPUNCTURE: CPT | Performed by: EMERGENCY MEDICINE

## 2022-01-10 PROCEDURE — 85049 AUTOMATED PLATELET COUNT: CPT | Performed by: STUDENT IN AN ORGANIZED HEALTH CARE EDUCATION/TRAINING PROGRAM

## 2022-01-10 PROCEDURE — 84443 ASSAY THYROID STIM HORMONE: CPT | Performed by: STUDENT IN AN ORGANIZED HEALTH CARE EDUCATION/TRAINING PROGRAM

## 2022-01-10 PROCEDURE — 84484 ASSAY OF TROPONIN QUANT: CPT | Performed by: EMERGENCY MEDICINE

## 2022-01-10 PROCEDURE — 99285 EMERGENCY DEPT VISIT HI MDM: CPT

## 2022-01-10 PROCEDURE — G1004 CDSM NDSC: HCPCS

## 2022-01-10 PROCEDURE — 70498 CT ANGIOGRAPHY NECK: CPT

## 2022-01-10 PROCEDURE — 70496 CT ANGIOGRAPHY HEAD: CPT

## 2022-01-10 PROCEDURE — 85610 PROTHROMBIN TIME: CPT | Performed by: EMERGENCY MEDICINE

## 2022-01-10 PROCEDURE — 97162 PT EVAL MOD COMPLEX 30 MIN: CPT

## 2022-01-10 PROCEDURE — G0427 INPT/ED TELECONSULT70: HCPCS | Performed by: PSYCHIATRY & NEUROLOGY

## 2022-01-10 PROCEDURE — 93306 TTE W/DOPPLER COMPLETE: CPT | Performed by: INTERNAL MEDICINE

## 2022-01-10 PROCEDURE — 85027 COMPLETE CBC AUTOMATED: CPT | Performed by: EMERGENCY MEDICINE

## 2022-01-10 PROCEDURE — 85730 THROMBOPLASTIN TIME PARTIAL: CPT | Performed by: EMERGENCY MEDICINE

## 2022-01-10 PROCEDURE — 80048 BASIC METABOLIC PNL TOTAL CA: CPT | Performed by: EMERGENCY MEDICINE

## 2022-01-10 PROCEDURE — 99285 EMERGENCY DEPT VISIT HI MDM: CPT | Performed by: EMERGENCY MEDICINE

## 2022-01-10 PROCEDURE — 70551 MRI BRAIN STEM W/O DYE: CPT

## 2022-01-10 RX ORDER — CLOPIDOGREL BISULFATE 75 MG/1
300 TABLET ORAL ONCE
Status: COMPLETED | OUTPATIENT
Start: 2022-01-10 | End: 2022-01-10

## 2022-01-10 RX ORDER — ASPIRIN 81 MG/1
324 TABLET, CHEWABLE ORAL ONCE
Status: COMPLETED | OUTPATIENT
Start: 2022-01-10 | End: 2022-01-10

## 2022-01-10 RX ORDER — ATORVASTATIN CALCIUM 40 MG/1
40 TABLET, FILM COATED ORAL EVERY EVENING
Status: DISCONTINUED | OUTPATIENT
Start: 2022-01-10 | End: 2022-01-11 | Stop reason: HOSPADM

## 2022-01-10 RX ORDER — SODIUM CHLORIDE 9 MG/ML
100 INJECTION, SOLUTION INTRAVENOUS CONTINUOUS
Status: DISCONTINUED | OUTPATIENT
Start: 2022-01-10 | End: 2022-01-11 | Stop reason: HOSPADM

## 2022-01-10 RX ORDER — ASPIRIN 81 MG/1
81 TABLET, CHEWABLE ORAL DAILY
Status: DISCONTINUED | OUTPATIENT
Start: 2022-01-11 | End: 2022-01-11 | Stop reason: HOSPADM

## 2022-01-10 RX ADMIN — IOHEXOL 85 ML: 350 INJECTION, SOLUTION INTRAVENOUS at 09:35

## 2022-01-10 RX ADMIN — ASPIRIN 81 MG CHEWABLE TABLET 324 MG: 81 TABLET CHEWABLE at 10:24

## 2022-01-10 RX ADMIN — CLOPIDOGREL BISULFATE 300 MG: 75 TABLET ORAL at 10:25

## 2022-01-10 RX ADMIN — ENOXAPARIN SODIUM 40 MG: 100 INJECTION SUBCUTANEOUS at 13:17

## 2022-01-10 RX ADMIN — ATORVASTATIN CALCIUM 40 MG: 40 TABLET, FILM COATED ORAL at 22:27

## 2022-01-10 RX ADMIN — SODIUM CHLORIDE 100 ML/HR: 0.9 INJECTION, SOLUTION INTRAVENOUS at 22:26

## 2022-01-10 NOTE — ASSESSMENT & PLAN NOTE
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

## 2022-01-10 NOTE — ASSESSMENT & PLAN NOTE
Transient ambulatory dysfunction and dysarthria, potentially with RUE weakness as well  Now resolved  Cannot rule out TIA vs  Minor stroke  Hx of Afib not on AC potentially due to findings on MRI indicating possible cerebral amyloid angiopathy vs  Multiple cavernous angiomas  That said, prior MRI about 10 years ago did not note susceptibility abnormalities to the extent noted on more recent MRIs, and so unlikely these would represent angiomas  Cerebral amyloid angiopathy in and of itself can possibly cause transient neurologic symptoms, or this may represent sequelae from whole brain radiation for his prior CNS mets from renal cell carcinoma  Cannot rule out additional metastasis either  Do not feel pt has bilateral occipital strokes based on current exam, however will clarify with MRI      -continue neurochecks; notify with changes  -HCT and CTA personally reviewed - no evidence of acute ischemia/hemorrhage and no significant vascular abnormality; possible subacute bilateral occipital infarcts  -obtain MRI brain with and without contrast  -loaded with ASA and Plavix; continue on DAPT with plan for 3 weeks total then ASA monotherapy for now; would not preclude ASA at this time long-term as no prior intracranial hemorrhages  -goal of normotension; no need for permissive HTN  -continue statin  -check lipid panel/A1c  -check TTE  -maintain on telemetry  -PT/OT evals  -will follow results; call with questions

## 2022-01-10 NOTE — H&P
[facility] [address]  H&P- Becka Gram 1955, 77 y o  male MRN: [id number]  Unit/Bed#: Z1 H1 Encounter: [id number]  Primary Care Provider: Barbara Osler, DO   Date and time admitted to hospital: 1/10/2022  9:45 AM    * Stroke-like symptoms  Assessment & Plan  Patient woke up 1/10 approximately 8 30 a m  with difficulty ambulating, right-sided weakness, left-sided facial droop and dysarthria  At time of presentation to ED symptoms have resolved  NIHSS 0  Patient not candidate for tPA  CTA head and neck revealed:   1  Gyriform cortical hyperdensity involving parasagittal bilateral occipital lobes may represent laminar necrosis and subacute cortical infarct  2   Small focal hyperdensity in the right postcentral gyrus corresponding to focal hemosiderin deposition seen on MRI 9/19/2019 may represent a cavernoma  3   Chronic microangiopathy  Neurology contacted by ED physician who recommended placement of stroke pathway for further workup    -will admit patient to stroke pathway  -ASA, Plavix  -NPO pending speech eval  -neurochecks  -will check TSH, lipid panel, A1c  -echo  -telemetry  -MRI brain  -PT/OT evaluation  -neurology consulted, appreciate recommendations      Paroxysmal atrial fibrillation [facility])  Assessment & Plan  Not on anticoagulation on outpatient basis    Malignant neoplasm of kidney [facility])  Assessment & Plan  Patient with 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) underwent radiation therapy 2012  -follows with Hematology[facility]Oncology at [facility]    Generalized anxiety disorder  Assessment & Plan  Continue Zoloft 50 mg p o  Daily      VTE Pharmacologic Prophylaxis: VTE Score: 5 High Risk (Score >/= 5) - Pharmacological DVT Prophylaxis Ordered: heparin  Sequential Compression Devices Ordered  Code Status: Level 1 - Full Code   Discussion with family: Updated  (wife) at bedside      Anticipated Length of Stay: Patient will be admitted on an observation basis with an anticipated length of stay of less than 2 midnights secondary to Stroke pathway  Total Time for Visit, including Counseling / Coordination of Care: 30 minutes Greater than 50% of this total time spent on direct patient counseling and coordination of care  Chief Complaint:  Stroke-like symptoms    History of Present Illness:  Luis E Crocker is a 77 y o  male with a PMH of history of malignant neoplasm kidney (with Mets to brain), paroxysmal Afib(not on [facility]), generalized anxiety disorder, who presents with stroke-like symptoms  Patient woke up at approximately 8:30 a m  With right-sided weakness, difficulty ambulating left-sided facial droop and dysarthria  Patient's symptoms resolved upon arrival to ED  CT imaging revealed no acute bleed however  Gyriform cortical hyperdensity involving parasagittal bilateral occipital lobes may represent laminar necrosis and subacute cortical infarct  Patient's NIHSS was 0, did not receive tPA    Neurology was consulted by ED physician who recommended admission under stroke pathway and further workup  Review of Systems:  Review of Systems   Constitutional: Negative  HENT: Negative  Eyes: Negative  Respiratory: Negative  Cardiovascular: Negative  Gastrointestinal: Negative  Genitourinary: Negative  Musculoskeletal: Negative  Skin: Negative  Neurological: Positive for facial asymmetry, speech difficulty and weakness  Psychiatric/Behavioral: Negative          Past Medical and Surgical History:   Past Medical History:   Diagnosis Date    Abnormal eye finding     last assessed 08/18/2015    Anemia     Atrial fibrillation (HCC)     Generalized anxiety disorder     History of kidney cancer     Hyperlipidemia     no longer    Hypertension     no longer       Past Surgical History:   Procedure Laterality Date    COLONOSCOPY N/A 3/10/2017    Procedure: COLONOSCOPY;  Surgeon: Elena Gardner MD;  Location: [facility]; Service:     EXTERNAL EAR SURGERY      KIDNEY SURGERY      TIBIA FRACTURE SURGERY      TONSILLECTOMY  childhood    TOOTH EXTRACTION  2021       Meds/Allergies:  Prior to Admission medications    Medication Sig Start Date End Date Taking? Authorizing Provider   amoxicillin (AMOXIL) 500 mg capsule  21   Historical Provider, MD   Bevacizumab (AVASTIN IV) Infuse into a venous catheter    Historical Provider, MD   hydrocortisone (ANUSOL-HC) 25 mg suppository Insert 1 suppository (25 mg total) into the rectum 2 (two) times a day  Patient not taking: Reported on 3/9/2021 11/16/20   John Vickers DO   montelukast (SINGULAIR) 10 mg tablet Take 1 tablet (10 mg total) by mouth daily at bedtime  Patient not taking: Reported on 7/15/2021 4/21/21   John Vickers DO   ofloxacin (OCUFLOX) 0 3 % ophthalmic solution  11/10/20   Historical Provider, MD   oxyCODONE-acetaminophen (PERCOCET) 5-325 mg per tablet 4 (four) times a day as needed  Patient not taking: Reported on 2021   Historical Provider, MD   prednisoLONE acetate (PRED FORTE) 1 % ophthalmic suspension  11/10/20   Historical Provider, MD   sertraline (ZOLOFT) 50 mg tablet Take 1 tablet (50 mg total) by mouth daily 11/3/21   John Vickers DO     I have reviewed home medications with patient personally  Allergies:    Allergies   Allergen Reactions    Prednisone        Social History:  Marital Status: /Civil Union   Substance Use History:   Social History     Substance and Sexual Activity   Alcohol Use No     Social History     Tobacco Use   Smoking Status Former Smoker    Types: Cigarettes    Quit date: 36    Years since quittin 0   Smokeless Tobacco Never Used   Tobacco Comment    2 cigarettes     Social History     Substance and Sexual Activity   Drug Use Never       Family History:  Family History   Problem Relation Age of Onset    No Known Problems Mother     Cancer Paternal Uncle        Physical Exam:     Vitals: Blood Pressure: 117/58 (01/10/22 1035)  Pulse: 57 (01/10/22 1035)  Temperature: (!) 97 2 °F (36 2 °C) (01/10/22 0947)  Temp Source: Tympanic (01/10/22 0947)  Respirations: 12 (01/10/22 1035)  Weight - Scale: 81 6 kg (180 lb) (01/10/22 0947)  SpO2: 100 % (01/10/22 1035)    Physical Exam  Constitutional:       General: He is in acute distress  Appearance: He is not ill-appearing  HENT:      Head: Normocephalic  Cardiovascular:      Rate and Rhythm: Normal rate and regular rhythm  Pulses: Normal pulses  Heart sounds: Normal heart sounds  Pulmonary:      Effort: Pulmonary effort is normal       Breath sounds: Normal breath sounds  Abdominal:      General: Abdomen is flat  Bowel sounds are normal       Palpations: Abdomen is soft  Musculoskeletal:         General: Normal range of motion  Cervical back: Normal range of motion  Skin:     General: Skin is warm and dry  Neurological:      General: No focal deficit present  Mental Status: He is alert and oriented to person, place, and time  Mental status is at baseline  Psychiatric:         Mood and Affect: Mood normal          Behavior: Behavior normal          Thought Content: Thought content normal          Judgment: Judgment normal           Additional Data:     Lab Results:  Results from last 7 days   Lab Units 01/10/22  1021   WBC Thousand/uL 5 55   HEMOGLOBIN g/dL 14 0   HEMATOCRIT % 43 6   PLATELETS Thousands/uL 194     Results from last 7 days   Lab Units 01/10/22  1015   SODIUM mmol/L 140   POTASSIUM mmol/L 4 3   CHLORIDE mmol/L 102   CO2 mmol/L 33*   BUN mg/dL 24   CREATININE mg/dL 1 00   ANION GAP mmol/L 5   CALCIUM mg/dL 10 1   GLUCOSE RANDOM mg/dL 83     Results from last 7 days   Lab Units 01/10/22  1032   INR  0 98                   Imaging: Reviewed radiology reports from this admission including: CT head  CTA stroke alert (head/neck)   Final Result by Adán Sanchez MD (01/10 1005)      1    No intracranial large vessel occlusion or critical stenosis  No aneurysm  2   No hemodynamically significant stenosis of cervical carotid and vertebral arteries  No dissection  I personally discussed this study with Dr Gillian Leal on 1/10/2022 at 9:49 AM              Workstation performed: [id number]         CT stroke alert brain   Final Result by Slade Hopkins MD (01/10 1006)      1  Gyriform cortical hyperdensity involving parasagittal bilateral occipital lobes may represent laminar necrosis and subacute cortical infarct  2   Small focal hyperdensity in the right postcentral gyrus corresponding to focal hemosiderin deposition seen on MRI 9/19/2019 may represent a cavernoma  3   Chronic microangiopathy  I personally discussed this study with Dr Gillian Leal on 1/10/2022 at 9:49 AM                Workstation performed: [id number]         MRI Inpatient Order    (Results Pending)       EKG and Other Studies Reviewed on Admission:   · EKG: Pending  ** Please Note: This note has been constructed using a voice recognition system   **

## 2022-01-10 NOTE — ED PROVIDER NOTES
History  Chief Complaint   Patient presents with   [address]     Patient is a 51-year-old male presents via EMS for stroke evaluation  Patient says that he woke up at 8:30 a m  this morning with right arm weakness  EMS noted that he had a right facial droop on arrival as well  Patient says that he went to bed at 11:30 p m  last night without any issues  The patient denies any significant medical history, however, according to the chart he has a history of hypertension, hyperlipidemia, AFib but is not on any blood thinning medications  On arrival, the patient has no complaints  His symptoms have resolved  He has an NIH stroke score of 0          Prior to Admission Medications   Prescriptions Last Dose Informant Patient Reported? Taking?    Bevacizumab (AVASTIN IV)  Self Yes No   Sig: Infuse into a venous catheter   amoxicillin (AMOXIL) 500 mg capsule  Self Yes No   Patient not taking: Reported on 2021    hydrocortisone (ANUSOL-HC) 25 mg suppository  Self No No   Sig: Insert 1 suppository (25 mg total) into the rectum 2 (two) times a day   Patient not taking: Reported on 3/9/2021   montelukast (SINGULAIR) 10 mg tablet  Self No No   Sig: Take 1 tablet (10 mg total) by mouth daily at bedtime   Patient not taking: Reported on 7/15/2021   ofloxacin (OCUFLOX) 0 3 % ophthalmic solution  Self Yes No   Patient not taking: Reported on 2021   oxyCODONE-acetaminophen (PERCOCET) 5-325 mg per tablet  Self Yes No   Si (four) times a day as needed   Patient not taking: Reported on 2021    prednisoLONE acetate (PRED FORTE) 1 % ophthalmic suspension  Self Yes No   Patient not taking: Reported on 2021   sertraline (ZOLOFT) 50 mg tablet  Self No No   Sig: Take 1 tablet (50 mg total) by mouth daily      Facility-Administered Medications: None       Past Medical History:   Diagnosis Date    Abnormal eye finding     last assessed 2015    Anemia     Atrial fibrillation (HCC)     Generalized anxiety disorder     History of kidney cancer     Hyperlipidemia     no longer    Hypertension     no longer       Past Surgical History:   Procedure Laterality Date    COLONOSCOPY N/A 3/10/2017    Procedure: COLONOSCOPY;  Surgeon: Jose Enrique Ordonez MD;  Location: [facility];  Service:    Vivian Nichols EXTERNAL EAR SURGERY      KIDNEY SURGERY      TIBIA FRACTURE SURGERY      TONSILLECTOMY  childhood    TOOTH EXTRACTION  2021       Family History   Problem Relation Age of Onset    No Known Problems Mother     Cancer Paternal Uncle      I have reviewed and agree with the history as documented  E-Cigarette/Vaping    E-Cigarette Use Never User      E-Cigarette/Vaping Substances    Nicotine No     THC No     CBD No     Flavoring No     Other No     Unknown No      Social History     Tobacco Use    Smoking status: Former Smoker     Types: Cigarettes     Quit date:      Years since quittin 0    Smokeless tobacco: Never Used    Tobacco comment: 2 cigarettes   Vaping Use    Vaping Use: Never used   Substance Use Topics    Alcohol use: No    Drug use: Never       Review of Systems   Constitutional: Negative for chills, fever and unexpected weight change  HENT: Negative for congestion, sore throat and trouble swallowing  Eyes: Negative for pain, discharge and itching  Respiratory: Negative for cough, chest tightness, shortness of breath and wheezing  Cardiovascular: Negative for chest pain, palpitations and leg swelling  Gastrointestinal: Negative for abdominal pain, blood in stool, diarrhea, nausea and vomiting  Endocrine: Negative for polyuria  Genitourinary: Negative for difficulty urinating, dysuria, frequency and hematuria  Musculoskeletal: Negative for arthralgias and back pain  Neurological: Positive for facial asymmetry and weakness (Right upper extremity)  Negative for dizziness, syncope, light-headedness and headaches         Physical Exam  Physical Exam  Vitals and nursing note reviewed  Constitutional:       General: He is not in acute distress  Appearance: He is well-developed  HENT:      Head: Normocephalic and atraumatic  Right Ear: External ear normal       Left Ear: External ear normal    Eyes:      Conjunctiva/sclera: Conjunctivae normal       Pupils: Pupils are equal, round, and reactive to light  Cardiovascular:      Rate and Rhythm: Normal rate and regular rhythm  Heart sounds: Normal heart sounds  No murmur heard  No friction rub  No gallop  Pulmonary:      Effort: Pulmonary effort is normal  No respiratory distress  Breath sounds: Normal breath sounds  No wheezing or rales  Abdominal:      General: Bowel sounds are normal  There is no distension  Palpations: Abdomen is soft  Tenderness: There is no abdominal tenderness  There is no guarding  Musculoskeletal:         General: No tenderness or deformity  Normal range of motion  Cervical back: Normal range of motion  Lymphadenopathy:      Cervical: No cervical adenopathy  Skin:     General: Skin is warm and dry  Neurological:      General: No focal deficit present  Mental Status: He is alert and oriented to person, place, and time  Mental status is at baseline  Cranial Nerves: No cranial nerve deficit  Sensory: No sensory deficit  Motor: No weakness or abnormal muscle tone        Coordination: Coordination normal    Psychiatric:         Behavior: Behavior normal          Vital Signs  ED Triage Vitals [01/10/22 0947]   Temperature Pulse Respirations Blood Pressure SpO2   (!) 97 2 °F (36 2 °C) 56 18 117/58 100 %      Temp Source Heart Rate Source Patient Position - Orthostatic VS BP Location FiO2 (%)   Tympanic Monitor Lying Left arm --      Pain Score       No Pain           Vitals:    01/10/22 0947 01/10/22 1035   BP: 117/58 117/58   Pulse: 56 57   Patient Position - Orthostatic VS: Lying          Visual Acuity  Visual Acuity      Most Recent Value   L Pupil Size (mm) 3   R Pupil Size (mm) 3          ED Medications  Medications   iohexol (OMNIPAQUE) 350 MG/ML injection (SINGLE-DOSE) 85 mL (85 mL Intravenous Given 1/10/22 0935)   aspirin chewable tablet 324 mg (324 mg Oral Given 1/10/22 1024)   clopidogrel (PLAVIX) tablet 300 mg (300 mg Oral Given 1/10/22 1025)       Diagnostic Studies  Results Reviewed     Procedure Component Value Units Date/Time    COVID/FLU/RSV - 2 hour TAT [[id number]] Collected: 01/10/22 1032    Lab Status: In process Specimen: Nares from Nasopharyngeal Swab Updated: 01/10/22 1035    HS Troponin I 2hr [[id number]] Collected: 01/10/22 1032    Lab Status:  In process Specimen: Blood from Arm, Left Updated: 01/10/22 1035    Protime-INR [[id number]]  (Normal) Resulted: 01/10/22 1032    Lab Status: Final result Specimen: Blood Updated: 01/10/22 1032     Protime 12 9 seconds      INR 0 98    APTT [[id number]]  (Abnormal) Resulted: 01/10/22 1032    Lab Status: Final result Specimen: Blood Updated: 01/10/22 1032     PTT 38 seconds     CBC and Platelet [[id number]]  (Normal) Resulted: 01/10/22 1021    Lab Status: Final result Specimen: Blood Updated: 01/10/22 1021     WBC 5 55 Thousand/uL      RBC 4 78 Million/uL      Hemoglobin 14 0 g/dL      Hematocrit 43 6 %      MCV 91 fL      MCH 29 3 pg      MCHC 32 1 g/dL      RDW 12 3 %      Platelets 211 Thousands/uL      MPV 10 3 fL     Basic metabolic panel [[id number]]  (Abnormal) Resulted: 01/10/22 1015    Lab Status: Final result Specimen: Blood Updated: 01/10/22 1015     Sodium 140 mmol/L      Potassium 4 3 mmol/L      Chloride 102 mmol/L      CO2 33 mmol/L      ANION GAP 5 mmol/L      BUN 24 mg/dL      Creatinine 1 00 mg/dL      Glucose 83 mg/dL      Calcium 10 1 mg/dL      eGFR 78 ml/min/1 73sq m     Narrative:      [place] guidelines for Chronic Kidney Disease (CKD):     Stage 1 with normal or high GFR (GFR > 90 mL/min/1 73 square meters)    Stage 2 Mild CKD (GFR = 60-89 mL/min/1 73 square meters)    Stage 3A Moderate CKD (GFR = 45-59 mL/min/1 73 square meters)    Stage 3B Moderate CKD (GFR = 30-44 mL/min/1 73 square meters)    Stage 4 Severe CKD (GFR = 15-29 mL/min/1 73 square meters)    Stage 5 End Stage CKD (GFR <15 mL/min/1 73 square meters)  Note: GFR calculation is accurate only with a steady state creatinine    HS Troponin 0hr (reflex protocol) [[id number]]  (Normal) Resulted: 01/10/22 1014    Lab Status: Final result Specimen: Blood Updated: 01/10/22 1014     hs TnI 0hr 4 ng/L                  CTA stroke alert (head/neck)   Final Result by Ilana West MD (01/10 1005)      1  No intracranial large vessel occlusion or critical stenosis  No aneurysm  2   No hemodynamically significant stenosis of cervical carotid and vertebral arteries  No dissection  I personally discussed this study with Dr Deborrah Hodgkins on 1/10/2022 at 9:49 AM              Workstation performed: [id number]         CT stroke alert brain   Final Result by Ilana West MD (01/10 1006)      1  Gyriform cortical hyperdensity involving parasagittal bilateral occipital lobes may represent laminar necrosis and subacute cortical infarct  2   Small focal hyperdensity in the right postcentral gyrus corresponding to focal hemosiderin deposition seen on MRI 9/19/2019 may represent a cavernoma  3   Chronic microangiopathy  I personally discussed this study with Dr Deborrah Hodgkins on 1/10/2022 at 9:49 AM                Workstation performed: [id number]                    Procedures  Procedures         ED Course  ED Course as of 01/10/22 1100   Mon Giovanni 10, 2022   0932 Spoke with neurology who said that patient is not a candidate for tPA at this time given time of onset and symptom resolution  Barring no acute findings on CT imaging, will start on aspirin 325 and 600 mg of Plavix    Will admit for stroke pathway                  Stroke Assessment     Row Name 01/10/22 1100 NIH Stroke Scale    Interval Baseline      Level of Consciousness (1a ) 0      LOC Questions (1b ) 0      LOC Commands (1c ) 0      Best Gaze (2 ) 0      Visual (3 ) 0      Facial Palsy (4 ) 0      Motor Arm, Left (5a ) 0      Motor Arm, Right (5b ) 0      Motor Leg, Left (6a ) 0      Motor Leg, Right (6b ) 0      Limb Ataxia (7 ) 0      Sensory (8 ) 0      Best Language (9 ) 0      Dysarthria (10 ) 0      Extinction and Inattention (11 ) (Formerly Neglect) 0      Total 0                Most Recent Value   TPA Decision Options    TPA Decision Patient not a TPA candidate  Patient is not a candidate options Symptoms resolved/clearly non disabling , Unclear time of onset outside appropriate time window  MDM  Number of Diagnoses or Management Options  Stroke-like symptoms  Diagnosis management comments: 70-year-old male presenting for evaluation of right arm weakness, right-sided facial droop  Noticed it at 8:30 a m  this morning when he woke up  Was normal when he went to bed at 11:30 p m  Farzad LaCoste Symptoms have resolved at this time  NIH score of 0  Pre-hospital stroke alert called  CT showed no acute abnormality  Spoke with neurology who recommended aspirin 325 and Plavix 300 mg  Patient admitted to hospital for stroke pathway          Disposition  Final diagnoses:   Stroke-like symptoms     Time reflects when diagnosis was documented in both MDM as applicable and the Disposition within this note     Time User Action Codes Description Comment    1/10/2022  9:29 AM Arcelia Olivo Add [R29 90] Stroke-like symptoms       ED Disposition     ED Disposition Condition Date/Time Comment    Admit Stable Mon Giovanni 10, 2022 10:15 AM Case was discussed with JUDE and the patient's admission status was agreed to be Admission Status: observation status to the service of Dr Senia Lind    None         Patient's Medications   Discharge Prescriptions    No medications on file       No discharge procedures on file      PDMP Review     None          ED Provider  Electronically Signed by           Grace Kidd DO  01/10/22 1100

## 2022-01-10 NOTE — PHYSICAL THERAPY NOTE
Physical Therapy Evaluation   Time in: 1245  Time out: 1257  Total evaluation time: 12 minutes    Patient's Name: Adelfo Arroyo    Admitting Diagnosis  Stroke-like symptoms [R29 90]    Problem List  Patient Active Problem List   Diagnosis    Benign essential hypertension    Bone metastasis ([address]    Degenerative disk disease    Generalized anxiety disorder    Generalized osteoarthritis    Hyperlipidemia    Iron deficiency anemia secondary to inadequate dietary iron intake    Macular edema    Malignant neoplasm of kidney ([address]    Metastasis to brain ([address]    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 ([address]    Malignant neoplasm metastatic to bone ([address]    Paroxysmal atrial fibrillation ([address]    Personal history of radiation therapy    Pulmonary nodules    Benign colonic polyp    Hypothyroidism    Cholelithiasis    Stroke-like symptoms       Past Medical History  Past Medical History:   Diagnosis Date    Abnormal eye finding     last assessed 08/18/2015    Anemia     Atrial fibrillation (HCC)     Generalized anxiety disorder     History of kidney cancer     Hyperlipidemia     no longer    Hypertension     no longer       Past Surgical History  Past Surgical History:   Procedure Laterality Date    COLONOSCOPY N/A 3/10/2017    Procedure: COLONOSCOPY;  Surgeon: Robert Arndt MD;  Location: [facility] OR;  Service:    [facility] EXTERNAL EAR SURGERY      KIDNEY SURGERY      TIBIA FRACTURE SURGERY      TONSILLECTOMY  childhood    TOOTH EXTRACTION  08/11/2021       PT performed at least 2 patient identifiers during session: Name and wristband  01/10/22 1246   PT Last Visit   PT Visit Date 01/10/22   Note Type   Note type Evaluation   Pain Assessment   Pain Assessment Tool 0-10   Pain Score No Pain   Restrictions/Precautions   Weight Bearing Precautions Per Order No   Other Precautions Fall Risk;Telemetry   Home Living   Type of [address] [place] [address] Two level;Bed/bath upstairs; Access  (FF to bedroom; 0 MANDO)   Bathroom Shower/Tub Tub/shower unit   Bathroom Toilet Standard   Bathroom Equipment   (no DME at baseline)   [address]   (no DME at baseline)   Prior Function   Level of [place] Independent with ADLs and functional mobility   Lives With Zahira Help From Family   ADL Assistance Independent   IADLs Independent   Falls in the last 6 months 0  (pt denies any falls)   Vocational Retired   Comments pt drives minimally d/t vision   General   Family/Caregiver Present No   Cognition   Overall Cognitive Status WFL   Arousal/Participation Alert   Orientation Level Oriented X4   Memory Within functional limits   Following Commands Follows all commands and directions without difficulty   Comments pt agreeable to PT session   Subjective   Subjective "I feel good"   RLE Assessment   RLE Assessment WFL  (5/5 throughout)   LLE Assessment   LLE Assessment WFL  (5/5 throughout)   Vision-Basic Assessment   Current Vision Wears glasses only for reading   Visual History   (pt reports blurry vision in L eye)   Vestibular   Spontaneous Nystagmus (-) no evidence of nystagmus at rest in room light   Coordination   Movements are Fluid and Coordinated 1  (WFL [place] c EC/EO c no LOB noted)   Sensation WFL  (pt denies numbness/tingling)   Heel to Shin Intact   Bed Mobility   Supine to Sit 6  Modified independent   Sit to Supine 6  Modified independent   Additional Comments BP while resting in bed = 123/69, SpO2 on RA = 100%, HR = 61 bpm   Transfers   Sit to Stand 6  Modified independent   Stand to Sit 6  Modified independent   Toilet transfer 6  Modified independent   Additional Comments pt denies any lightheadedness/dizziness with and following positional changes, no overt LOB with dynamic head turns   Ambulation/Elevation   Gait pattern WNL; Shuffling   Gait Assistance 6  Modified independent   Assistive Device None   Distance 165 ft   Stair Management Assistance Not tested   Balance   Static Sitting Normal   Dynamic Sitting Normal   Static Standing Normal   Dynamic Standing Good   Ambulatory Good   Endurance Deficit   Endurance Deficit No   Activity Tolerance   Activity Tolerance Patient tolerated treatment well   Nurse Made Aware Yes, RN made aware of outcomes/recs   Assessment   Prognosis Excellent   Assessment Pt is 77 y o  male seen for moderate-complexity PT evaluation s/p admit to [facility] on 1/10/2022 w/ Stroke-like symptoms; pt presented to ED c R sided weakness, difficulty ambulating, L facial droop, dysarthria  PT consulted to assess pt's functional mobility and d/c needs  Order placed for PT eval and tx  Comorbidities affecting pt's physical performance at time of assessment include: AFib, malignant neoplasm of kidney with mets to brain, MEBER  PTA, pt was independent w/ all functional mobility w/ no AD or DME, ambulates unrestricted distances and all terrain, has 0 MANDO, lives w/ spouse in 2 level home and retired  0 personal factors are noted to affect pt at time of IE  Please find objective findings from PT assessment regarding body systems outlined above  The following objective measures performed on IE: Barthel Index: 50/200, AM-PAC 6-Clicks: 06/35 and Dynamic Gait Index: 22/24 (low risk for falls)  Pt's clinical presentation is currently evolving seen in pt's presentation of pending MRI d/t symptoms on presentation  From PT/mobility standpoint, pt appears to be functioning close to or at mobility baseline, therefore no further immediate skilled PT needs are warranted at this time  Pt currently performing all phases of functional mobility at independent level without need for AD  Recommend pt continue to mobilize ad osman while here  Recommend pt return to previous living environment once medically cleared and with continued family support  Will sign off, D/C PT  Please reconsult if further immediate skilled PT needs are warranted     Barriers to Discharge None   Goals Patient Goals "to go home"   PT Treatment Day 0   Plan   Treatment/Interventions Spoke to nursing   PT Frequency   (D/C PT)   Recommendation   PT Discharge Recommendation No rehabilitation needs   Equipment Recommended   (none at this time)   Additional Comments Pt's raw score on the AM-PAC Basic Mobility inpatient short form is 23, standardized score is 50 88  Patients at this level are likely to benefit from DC to home with no services, however, please refer to therapist recommendation for safe DC planning  AM-PAC Basic Mobility Inpatient   Turning in Bed Without Bedrails 4   Lying on Back to Sitting on Edge of Flat Bed 4   Moving Bed to Chair 4   Standing Up From Chair 4   Walk in Room 4   Climb 3-5 Stairs 3   Basic Mobility Inpatient Raw Score 23   Basic Mobility Standardized Score 50 88   Highest Level Of Mobility   JH-HLM Goal 7: Walk 25 feet or more   JH-HLM Highest Level of Mobility 7: Walk 25 feet or more   JH-HLM Goal Achieved Yes   Barthel Index   Feeding 10   Bathing 5   Grooming Score 5   Dressing Score 10   Bladder Score 10   Bowels Score 10   Toilet Use Score 10   Transfers (Bed/Chair) Score 15   Mobility (Level Surface) Score 15   Stairs Score 5   Barthel Index Score 95   Dynamic Gait Index   Gait level surface  3   Change in gait speed 3   Gait with horizontal head turns  2   Gait with vertical head turns  2   Gait and pivot turn 3   Step over obstacle 3   Step around obstacle 3   Steps 3  (DNT, suspect normal)   Total score  22   End of Consult   Patient Position at End of Consult Supine; All needs within reach       Chito Centeno, PT, DPT

## 2022-01-10 NOTE — TELEMEDICINE
TeleConsultation - Neurology   Fe Dillon 77 y o  male MRN: [id number]  Unit/Bed#: Z1 H1 Encounter: [id number]    REQUIRED DOCUMENTATION:     1  This service was provided via Telemedicine  2  Provider located at [facility]  3  TeleMed provider: Dr Javier Peterson  4  Identify all parties in room with patient during tele consult:  Vianey Bartlett (virtual care assistant)  5  Patient was then informed that this was a Telemedicine visit and that the exam was being conducted confidentially over secure lines  My office door was closed  No one else was in the room  Patient acknowledged consent and understanding of privacy and security of the Telemedicine visit, and gave us permission to have the assistant stay in the room in order to assist with the history and to conduct the exam   I informed the patient that I have reviewed their record in Epic and presented the opportunity for them to ask any questions regarding the visit today  The patient agreed to participate  Assessment/Plan     * Stroke-like symptoms  Assessment & Plan  Transient ambulatory dysfunction and dysarthria, potentially with RUE weakness as well  Now resolved  Cannot rule out TIA vs  Minor stroke  Hx of Afib not on AC potentially due to findings on MRI indicating possible cerebral amyloid angiopathy vs  Multiple cavernous angiomas  That said, prior MRI about 10 years ago did not note susceptibility abnormalities to the extent noted on more recent MRIs, and so unlikely these would represent angiomas  Cerebral amyloid angiopathy in and of itself can possibly cause transient neurologic symptoms, or this may represent sequelae from whole brain radiation for his prior CNS mets from renal cell carcinoma  Cannot rule out additional metastasis either  Do not feel pt has bilateral occipital strokes based on current exam, however will clarify with MRI      -continue neurochecks; notify with changes  -HCT and CTA personally reviewed - no evidence of acute ischemia/hemorrhage and no significant vascular abnormality; possible subacute bilateral occipital infarcts  -obtain MRI brain with and without contrast  -loaded with ASA and Plavix; continue on DAPT with plan for 3 weeks total then ASA monotherapy for now; would not preclude ASA at this time long-term as no prior intracranial hemorrhages  -goal of normotension; no need for permissive HTN  -continue statin  -check lipid panel/A1c  -check TTE  -maintain on telemetry  -PT/OT evals  -will follow results; call with questions    Recommendations for outpatient neurological follow up have yet to be determined  Reason for Consult / Principal Problem: Stroke Alert  Hx and PE limited by: N/A  Patient last known well: 11:30pm 1/9  Stroke alert called: 9:25am 1/10  Neurology time of arrival: immediate, by phone    HPI: Tahira Landis is a 77 y o  right handed male with HTN, PAF not on AC, hypothyroidism, anxiety, renal CA diagnosed in 2011 s/p nephrectomy with metastasis to lung, brain and bone s/p whole brain radiation therapy, HLD who presents with report of RUE weakness and ambulatory dysfunction, now resolved  Pt himself denies having any lateralized weakness but noted ambulatory dysfunction that was new as well as dysarthria when he awoke this morning  Was fine when he went to bed last night  Denies prior strokes  NIHSS was 0 upon arrival to the ED with no deficits noted  HCT and CTA were unremarkable for acute ischemia/hemorrhage and no significant vascular abnormality was present  His imaging did note possible bilateral occipital subacute stroke with laminar necrosis  Pt was not a tPA candidate given time window and no notable deficit  To review, patient with history of renal cell CA in 2011 and he follows with oncology team for this  He follows with PCP for HTN, hypothyroidism and anxiety  He was seen in August 2021 and at that time was noted to be doing well   He also follows with Dr Nick Ordonez at [facility] and was most recently seen in 2020  At that time, MR imaging from 2019 was reviewed which demonstrated white matter changes as well as innumerable small areas of susceptibility scattered throughout the cerebral hemispheres  He was referred to neurosurgery team at [facility] and was seen in 2020 and was noted to have a nonfocal neurologic exam  No additional follow-up imaging was recommended at that time  Consult to Neurology  Consult performed by: Kayleigh Stanton PA-C  Consult ordered by: David Lui DO    Inpatient consult to Neurology  Consult performed by: Boston Parks DO  Consult ordered by: Rosana Bautista DO         Review of Systems   Musculoskeletal: Positive for gait problem  Neurological: Positive for speech difficulty  All other systems reviewed and are negative        Historical Information   Past Medical History:   Diagnosis Date    Abnormal eye finding     last assessed 2015    Anemia     Atrial fibrillation (HCC)     Generalized anxiety disorder     History of kidney cancer     Hyperlipidemia     no longer    Hypertension     no longer     Past Surgical History:   Procedure Laterality Date    COLONOSCOPY N/A 3/10/2017    Procedure: COLONOSCOPY;  Surgeon: Audrey Borjas MD;  Location: [facility];  Service:    [facility] EXTERNAL EAR SURGERY      KIDNEY SURGERY      TIBIA FRACTURE SURGERY      TONSILLECTOMY  childhood    TOOTH EXTRACTION  2021     Social History   Social History     Substance and Sexual Activity   Alcohol Use No     Social History     Substance and Sexual Activity   Drug Use Never     E-Cigarette/Vaping    E-Cigarette Use Never User      E-Cigarette/Vaping Substances    Nicotine No     THC No     CBD No     Flavoring No     Other No     Unknown No      Social History     Tobacco Use   Smoking Status Former Smoker    Types: Cigarettes    Quit date:  Audelia Austin    Years since quittin 0   Smokeless Tobacco Never Used   Tobacco Comment    2 cigarettes     Family History:   Family History   Problem Relation Age of Onset    No Known Problems Mother     Cancer Paternal Uncle        Review of previous medical records was completed  Please see HPI  Meds/Allergies   Scheduled Meds:  Current Facility-Administered Medications   Medication Dose Route Frequency Provider Last Rate    [START ON 2022] aspirin  81 mg Oral Daily Dandy Degrooter, DO      atorvastatin  40 mg Oral QPM Dandy Degrooter, DO      enoxaparin  40 mg Subcutaneous Daily Dandy Joel,       sodium chloride  100 mL/hr Intravenous Continuous Dandy Joel,        Continuous Infusions:sodium chloride, 100 mL/hr      PRN Meds:  Allergies   Allergen Reactions    Prednisone        Objective   Vitals:Blood pressure 123/69, pulse 60, temperature (!) 97 2 °F (36 2 °C), resp  rate 12, weight 81 6 kg (180 lb), SpO2 100 %  ,Body mass index is 27 37 kg/m²  No intake or output data in the 24 hours ending 01/10/22 1259    Invasive Devices: Invasive Devices  Report    Peripheral Intravenous Line            Peripheral IV 01/10/22 Right <1 day              NIHSS:    1a Level of Consciousness: 0 = Alert   1b  LOC Questions: 0 = Answers both correctly   1c  LOC Commands: 0 = Obeys both correctly   2  Best Gaze: 0 = Normal   3  Visual: 0 = No visual field loss   4  Facial Palsy: 0=Normal symmetric movement   5a  Motor Right Arm: 0=No drift, limb holds 90 (or 45) degrees for full 10 seconds   5b  Motor Left Arm: 0=No drift, limb holds 90 (or 45) degrees for full 10 seconds   6a  Motor Right Le=No drift, limb holds 90 (or 45) degrees for full 10 seconds   6b  Motor Left Le=No drift, limb holds 90 (or 45) degrees for full 10 seconds   7  Limb Ataxia:  0=Absent   8  Sensory: 0=Normal; no sensory loss   9  Best Language:  0=No aphasia, normal   10  Dysarthria: 0=Normal articulation   11   Extinction and Inattention (formerly Neglect): 0=No abnormality   Total Score: 0     1 (No significant disability  Able to carry out all usual activities, despite some symptoms)    Physical Exam  Constitutional:       Appearance: He is well-developed  HENT:      Head: Normocephalic and atraumatic  Eyes:      Extraocular Movements: EOM normal       Conjunctiva/sclera: Conjunctivae normal    Pulmonary:      Effort: No respiratory distress  Abdominal:      General: There is no distension  Musculoskeletal:         General: No swelling  Cervical back: Normal range of motion  Skin:     Coloration: Skin is not jaundiced  Neurological:      Mental Status: He is alert and oriented to person, place, and time  Coordination: Finger-Nose-Finger Test normal       Deep Tendon Reflexes: Strength normal    Psychiatric:         Speech: Speech normal        Neurologic Exam     Mental Status   Oriented to person, place, and time  Attention: normal  Concentration: normal    Speech: speech is normal   Level of consciousness: alert  Able to name object  Able to repeat  Normal comprehension  Cranial Nerves     CN III, IV, VI   Extraocular motions are normal      CN V   Facial sensation intact  CN VII   Facial expression full, symmetric  CN VIII   Hearing: intact    CN XII   Tongue deviation: none    Motor Exam     Strength   Strength 5/5 throughout       Sensory Exam   Light touch normal      Gait, Coordination, and Reflexes     Coordination   Finger to nose coordination: normal      Lab Results:   Recent Results (from the past 24 hour(s))   HS Troponin 0hr (reflex protocol)    Collection Time: 01/10/22 10:14 AM   Result Value Ref Range    hs TnI 0hr 4 "Refer to ACS Flowchart"- see link ng/L   Basic metabolic panel    Collection Time: 01/10/22 10:15 AM   Result Value Ref Range    Sodium 140 135 - 147 mmol/L    Potassium 4 3 3 5 - 5 3 mmol/L    Chloride 102 96 - 108 mmol/L    CO2 33 (H) 21 - 32 mmol/L    ANION GAP 5 4 - 13 mmol/L    BUN 24 5 - 25 mg/dL    Creatinine 1 00 0 60 - 1 30 mg/dL Glucose 83 65 - 140 mg/dL    Calcium 10 1 8 4 - 10 2 mg/dL    eGFR 78 ml/min/1 73sq m   CBC and Platelet    Collection Time: 01/10/22 10:21 AM   Result Value Ref Range    WBC 5 55 4 31 - 10 16 Thousand/uL    RBC 4 78 3 88 - 5 62 Million/uL    Hemoglobin 14 0 12 0 - 17 0 g/dL    Hematocrit 43 6 36 5 - 49 3 %    MCV 91 82 - 98 fL    MCH 29 3 26 8 - 34 3 pg    MCHC 32 1 31 4 - 37 4 g/dL    RDW 12 3 11 6 - 15 1 %    Platelets 527 576 - 096 Thousands/uL    MPV 10 3 8 9 - 12 7 fL   COVID/FLU/RSV - 2 hour TAT    Collection Time: 01/10/22 10:32 AM    Specimen: Nasopharyngeal Swab; Nares   Result Value Ref Range    SARS-CoV-2 Negative Negative    INFLUENZA A PCR Negative Negative    INFLUENZA B PCR Negative Negative    RSV PCR Negative Negative   HS Troponin I 2hr    Collection Time: 01/10/22 10:32 AM   Result Value Ref Range    hs TnI 2hr 3 "Refer to ACS Flowchart"- see link ng/L    Delta 2hr hsTnI -1 ng/L   Protime-INR    Collection Time: 01/10/22 10:32 AM   Result Value Ref Range    Protime 12 9 11 6 - 14 5 seconds    INR 0 98 0 84 - 1 19   APTT    Collection Time: 01/10/22 10:32 AM   Result Value Ref Range    PTT 38 (H) 23 - 37 seconds         Imaging Studies: I have personally reviewed pertinent reports  and I have personally reviewed pertinent films in PACS  CTA head and neck with and without contrast- No intracranial large vessel occlusion or critical stenosis, no aneurysm  No hemodynamically significant stenosis of cervical carotid or vertebral arteries, no dissection  CTH- Gyriform cortical hyperdensity involving parasagittal B/L occipital lobes may represent laminar necrosis and subacute cortical infarct  Small focal hyperdensity in the right postcentral gyrus corresponding to focal hemosiderin deposition seen on MRI 9/19/2019 may represent a cavernoma  Chronic microangiopathy  Total critical care time 30 minutes excluding teaching/procedures

## 2022-01-10 NOTE — OCCUPATIONAL THERAPY NOTE
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 ([facility] )    Malignant neoplasm metastatic to bone (HCC)    Paroxysmal atrial fibrillation (HCC)    Personal history of radiation therapy    Pulmonary nodules    Benign colonic polyp    Hypothyroidism    Cholelithiasis    Stroke-like symptoms       Past Medical History:   Diagnosis Date    Abnormal eye finding     last assessed 08/18/2015    Anemia     Atrial fibrillation (HCC)     Generalized anxiety disorder     History of kidney cancer     Hyperlipidemia     no longer    Hypertension     no longer       Past Surgical History:   Procedure Laterality Date    COLONOSCOPY N/A 3/10/2017    Procedure: COLONOSCOPY;  Surgeon: Robert Arndt MD;  Location: [facility];  Service:    Uday Warner EXTERNAL EAR SURGERY      KIDNEY SURGERY      TIBIA FRACTURE SURGERY      TONSILLECTOMY  childhood    TOOTH EXTRACTION  08/11/2021        01/10/22 1257   OT Last Visit   OT Visit Date 01/10/22   Note Type   Note type Evaluation   Additional Comments Pt agreeable to OT eval  Upon arrival, pt supine in bed c HOB elevated  Restrictions/Precautions   Weight Bearing Precautions Per Order No   Other Precautions Fall Risk;Telemetry   Pain Assessment   Pain Assessment Tool 0-10   Pain Score No Pain   Home Living   Type of Home House   Home Layout Two level;Bed/bath upstairs; Access  (0 MANDO, FOS to 2nd floor )   Bathroom Shower/Tub Tub/shower unit   Bathroom Toilet Standard   Bathroom Equipment   (no DME reported )   [address]   (no AD used at baseline )   Prior Function   Level of [place] Independent with ADLs and functional mobility   Lives With Spouse   Receives Help From Family   ADL Assistance Independent   IADLs Independent   Falls in the last 6 months 0  (pt denies any falls)   Vocational Retired   Comments pt drives minimally d/t vision   Lifestyle   Autonomy Patient reporting being independent with ADLs/IADLs, ambulatory with no AD and lives with wife in a two story house   Reciprocal Relationships wife    Service to Others retired    ADL   Eating Assistance 7  Independent   Grooming Assistance 7  [address] 6  Modified Independent   LB [address] 6  [facility] 6  Modified independent   [address] 6  Modified independent   [address]  6  Modified independent   Bed Mobility   Supine to Sit 6  Modified independent   Additional items HOB elevated   Sit to Supine 6  Modified independent   Additional items HOB elevated   Additional Comments BP while resting in bed = 123/69, SpO2 on RA = 100%, HR = 61 bpm   Transfers   Sit to Stand 7  Independent   Stand to Sit 7  Independent   Toilet transfer 7  Independent   Additional Comments pt denies any lightheadedness/dizziness with and following positional changes, no overt LOB with dynamic head turns   Functional Mobility   Functional Mobility 7  Independent   Additional Comments Pt ambulated to/from bathroom and in hallway with no overt LOB or SOB   Additional items   (Pt ambulated with no AD)   Balance   Static Sitting Normal   Dynamic Sitting Normal   Static Standing Normal   Dynamic Standing Good   Activity Tolerance   Activity Tolerance Patient tolerated treatment well   Medical Staff Made Aware Pt seen as a co-eval with PT due to the patient's co-morbidities & clinically unstable presentation   Nurse Made Aware RN made aware of outcomes    RUE Assessment   RUE Assessment WNL   LUE Assessment   LUE Assessment WNL   Hand Function   Gross Motor Coordination Functional   Fine Motor Coordination Functional   Sensation   Light Touch No apparent deficits  (per pt report)   Vision-Basic Assessment   Current Vision Wears glasses only for reading   Visual History   (pt reports blurry vision in L eye)   Patient Visual Report   (pt reports "poor vision" & follows c eye dr  )   Manasa Veloz   Overall Cognitive Status [facility]   Arousal/Participation Alert; Responsive; Cooperative   Attention Within functional limits   Orientation Level Oriented X4   Memory Within functional limits   Following Commands Follows all commands and directions without difficulty   Assessment   Prognosis Good   Assessment Pt is a 77 y o  male who was admitted to [address] [place] [address] on 1/10/2022 with Stroke-like symptoms  At this time, patient is also affected by the comorbidities of: generalized anxiety disorder, malignant neoplasm of kidney and A-fib  Additionally, patient is affected by the following personal factors:steps to enter environment  Orders placed for OT evaluation/treatment  Prior to admission, Patient reporting being independent with ADLs/IADLs, ambulatory with no AD and lives with wife in a two story house  Upon OT evaluation, patient requires modified independent  for UB ADLs and modified independent  for LB ADLs  Patient presents with functional use of BUEs, with intact prehension and fine motor coordination, and symmetrical muscle strength  Patient appears to be functioning at baseline, no further Acute OT needs identified at this time to warrant OT services  D/C OT and from OT standpoint, recommendation at time of d/c would be No rehabilitation needs     Goals   Patient Goals to go home    Plan   OT Frequency Eval only   Recommendation   OT Discharge Recommendation No rehabilitation needs   OT - OK to Discharge Yes  (Once medically cleared )   Additional Comments  At end of eval, pt supine in bed with all needs met   AM-PAC Daily Activity Inpatient   Lower Body Dressing 4   Bathing 4   Toileting 4   Upper Body Dressing 4   Grooming 4   Eating 4   Daily Activity Raw Score 24   Daily Activity Standardized Score (Calc for Raw Score >=11) 57 54   AM-PAC Applied Cognition Inpatient   Following a Speech/Presentation 4   Understanding Ordinary Conversation 4   Taking Medications 4   Remembering Where Things Are Placed or Put Away 4   Remembering List of 4-5 Errands 4   Taking Care of Complicated Tasks 4   Applied Cognition Raw Score 24   Applied Cognition Standardized Score 62 21   Barthel Index   Feeding 10   Bathing 5   Grooming Score 5   Dressing Score 10   Bladder Score 10   Bowels Score 10   Toilet Use Score 10   Transfers (Bed/Chair) Score 15   Mobility (Level Surface) Score 15   Stairs Score 5   Barthel Index Score 95   Modified Darlington Scale   Modified Darlington Scale 1     Shivam Swift, OT

## 2022-01-10 NOTE — ASSESSMENT & PLAN NOTE
Patient woke up 1/10 approximately 8 30 a m  with difficulty ambulating, right-sided weakness, left-sided facial droop and dysarthria  At time of presentation to ED symptoms have resolved  NIHSS 0  Patient not candidate for tPA  CTA head and neck revealed:   1  Gyriform cortical hyperdensity involving parasagittal bilateral occipital lobes may represent laminar necrosis and subacute cortical infarct  2   Small focal hyperdensity in the right postcentral gyrus corresponding to focal hemosiderin deposition seen on MRI 9/19/2019 may represent a cavernoma  3   Chronic microangiopathy    Neurology contacted by ED physician who recommended placement of stroke pathway for further workup    -will admit patient to stroke pathway  -ASA, Plavix  -NPO pending speech eval  -neurochecks  -will check TSH, lipid panel, A1c  -echo  -telemetry  -MRI brain  -PT/OT evaluation  -neurology consulted, appreciate recommendations

## 2022-01-11 VITALS
BODY MASS INDEX: 27.28 KG/M2 | DIASTOLIC BLOOD PRESSURE: 64 MMHG | HEIGHT: 68 IN | SYSTOLIC BLOOD PRESSURE: 111 MMHG | TEMPERATURE: 97.1 F | OXYGEN SATURATION: 96 % | RESPIRATION RATE: 16 BRPM | HEART RATE: 55 BPM | WEIGHT: 180 LBS

## 2022-01-11 LAB
ALBUMIN SERPL BCP-MCNC: 3.3 G/DL (ref 3.5–5)
ALP SERPL-CCNC: 62 U/L (ref 34–104)
ALT SERPL W P-5'-P-CCNC: 14 U/L (ref 7–52)
ANION GAP SERPL CALCULATED.3IONS-SCNC: 5 MMOL/L (ref 4–13)
AST SERPL W P-5'-P-CCNC: 13 U/L (ref 13–39)
BILIRUB SERPL-MCNC: 0.43 MG/DL (ref 0.2–1)
BUN SERPL-MCNC: 20 MG/DL (ref 5–25)
CALCIUM ALBUM COR SERPL-MCNC: 8.2 MG/DL (ref 8.3–10.1)
CALCIUM SERPL-MCNC: 7.6 MG/DL (ref 8.4–10.2)
CHLORIDE SERPL-SCNC: 110 MMOL/L (ref 96–108)
CHOLEST SERPL-MCNC: 129 MG/DL
CO2 SERPL-SCNC: 26 MMOL/L (ref 21–32)
CREAT SERPL-MCNC: 0.81 MG/DL (ref 0.6–1.3)
ERYTHROCYTE [DISTWIDTH] IN BLOOD BY AUTOMATED COUNT: 12.2 % (ref 11.6–15.1)
EST. AVERAGE GLUCOSE BLD GHB EST-MCNC: 105 MG/DL
GFR SERPL CREATININE-BSD FRML MDRD: 92 ML/MIN/1.73SQ M
GLUCOSE P FAST SERPL-MCNC: 75 MG/DL (ref 65–99)
GLUCOSE SERPL-MCNC: 75 MG/DL (ref 65–140)
HBA1C MFR BLD: 5.3 %
HCT VFR BLD AUTO: 36.7 % (ref 36.5–49.3)
HDLC SERPL-MCNC: 32 MG/DL
HGB BLD-MCNC: 11.8 G/DL (ref 12–17)
LDLC SERPL CALC-MCNC: 84 MG/DL (ref 0–100)
MCH RBC QN AUTO: 29.6 PG (ref 26.8–34.3)
MCHC RBC AUTO-ENTMCNC: 32.2 G/DL (ref 31.4–37.4)
MCV RBC AUTO: 92 FL (ref 82–98)
PLATELET # BLD AUTO: 151 THOUSANDS/UL (ref 149–390)
PMV BLD AUTO: 9.6 FL (ref 8.9–12.7)
POTASSIUM SERPL-SCNC: 3.4 MMOL/L (ref 3.5–5.3)
PROT SERPL-MCNC: 5.1 G/DL (ref 6.4–8.4)
RBC # BLD AUTO: 3.98 MILLION/UL (ref 3.88–5.62)
SODIUM SERPL-SCNC: 141 MMOL/L (ref 135–147)
TRIGL SERPL-MCNC: 64 MG/DL
WBC # BLD AUTO: 4.91 THOUSAND/UL (ref 4.31–10.16)

## 2022-01-11 PROCEDURE — 36415 COLL VENOUS BLD VENIPUNCTURE: CPT | Performed by: STUDENT IN AN ORGANIZED HEALTH CARE EDUCATION/TRAINING PROGRAM

## 2022-01-11 PROCEDURE — 83036 HEMOGLOBIN GLYCOSYLATED A1C: CPT | Performed by: STUDENT IN AN ORGANIZED HEALTH CARE EDUCATION/TRAINING PROGRAM

## 2022-01-11 PROCEDURE — 92523 SPEECH SOUND LANG COMPREHEN: CPT

## 2022-01-11 PROCEDURE — 85027 COMPLETE CBC AUTOMATED: CPT | Performed by: STUDENT IN AN ORGANIZED HEALTH CARE EDUCATION/TRAINING PROGRAM

## 2022-01-11 PROCEDURE — 99217 PR OBSERVATION CARE DISCHARGE MANAGEMENT: CPT | Performed by: STUDENT IN AN ORGANIZED HEALTH CARE EDUCATION/TRAINING PROGRAM

## 2022-01-11 PROCEDURE — 80053 COMPREHEN METABOLIC PANEL: CPT | Performed by: STUDENT IN AN ORGANIZED HEALTH CARE EDUCATION/TRAINING PROGRAM

## 2022-01-11 PROCEDURE — 80061 LIPID PANEL: CPT | Performed by: STUDENT IN AN ORGANIZED HEALTH CARE EDUCATION/TRAINING PROGRAM

## 2022-01-11 RX ORDER — ATORVASTATIN CALCIUM 40 MG/1
40 TABLET, FILM COATED ORAL EVERY EVENING
Qty: 30 TABLET | Refills: 0 | Status: SHIPPED | OUTPATIENT
Start: 2022-01-11

## 2022-01-11 RX ORDER — ASPIRIN 81 MG/1
81 TABLET, CHEWABLE ORAL DAILY
Qty: 30 TABLET | Refills: 0 | Status: SHIPPED | OUTPATIENT
Start: 2022-01-12

## 2022-01-11 RX ORDER — CLOPIDOGREL BISULFATE 75 MG/1
75 TABLET ORAL DAILY
Qty: 20 TABLET | Refills: 0 | Status: SHIPPED | OUTPATIENT
Start: 2022-01-11

## 2022-01-11 RX ORDER — POTASSIUM CHLORIDE 20 MEQ/1
40 TABLET, EXTENDED RELEASE ORAL ONCE
Status: COMPLETED | OUTPATIENT
Start: 2022-01-11 | End: 2022-01-11

## 2022-01-11 RX ADMIN — ENOXAPARIN SODIUM 40 MG: 100 INJECTION SUBCUTANEOUS at 09:34

## 2022-01-11 RX ADMIN — ASPIRIN 81 MG CHEWABLE TABLET 81 MG: 81 TABLET CHEWABLE at 09:37

## 2022-01-11 RX ADMIN — POTASSIUM CHLORIDE 40 MEQ: 1500 TABLET, EXTENDED RELEASE ORAL at 09:35

## 2022-01-11 NOTE — QUICK NOTE
Neurology Note    Reviewed MRI imaging - no evidence of acute ischemia present  Numerous susceptibility-weighted signal abnormalities noted throughout as seen on prior MRI imaging  LDL 84  A1c pending - would follow  TTE unremarkable  As noted before, cannot entirely rule out TIA as an etiology for his transient symptoms  If his imaging findings represent cerebral amyloid angiopathy, then this can cause transient neurologic symptoms in and of itself  In this patient with hx of whole brain radiation for CNS mets related to renal cell carcinoma, susceptibility abnormalities can also be seen and 10 years ago this was not noted on imaging  Feel CAA may be less likely and this represents sequelae of his prior radiation, however cannot truly discern between the two  DAPT would be reasonable for 3 weeks followed by ASA monotherapy going forward  Given he has not had any known hemorrhagic complications including intracranial hemorrhage, has maintained good BP control, and have a lower suspicion for true CAA, I do not feel this necessarily precludes him from anticoagulation in the setting of his Afib save for another contraindication  No further inpatient recommendations at this time  Would follow in stroke clinic as an outpatient  Call with questions  Tahira Landis will need follow up in in 6 weeks with neurovascular attending  He will not require outpatient neurological testing

## 2022-01-11 NOTE — ASSESSMENT & PLAN NOTE
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

## 2022-01-11 NOTE — DISCHARGE SUMMARY
[address]  Discharge- Mich Castillo 1955, 77 y o  male MRN: [id number]  Unit/Bed#: Z1 H1 Encounter: [id number]  Primary Care Provider: Shanda Nguyen DO   Date and time admitted to hospital: 1/10/2022  9:45 AM    * Stroke-like symptoms  Assessment & Plan  Patient woke up 1/10 approximately 8 30 a m  with difficulty ambulating, right-sided weakness, left-sided facial droop and dysarthria  At time of presentation to ED symptoms have resolved  NIHSS 0  Patient not candidate for tPA  CTA head and neck revealed:   1  Gyriform cortical hyperdensity involving parasagittal bilateral occipital lobes may represent laminar necrosis and subacute cortical infarct  2   Small focal hyperdensity in the right postcentral gyrus corresponding to focal hemosiderin deposition seen on MRI 9/19/2019 may represent a cavernoma  3   Chronic microangiopathy  Neurology contacted by ED physician who recommended placement of stroke pathway for further workup    -will admit patient to stroke pathway  -echo normal EF, diastolic dysfunction  -MRI brain revealed:    1  No acute infarction, intracranial hemorrhage or mass effect  2   Moderate, chronic microangiopathy redemonstrated  3   Extensive multifocal blooming artifact throughout the supra and infratentorial brain redemonstrated, possibly related to amyloid angiopathy  Differential diagnosis includes multiple cavernous angiomas (hereditary) versus perhaps sequela of chronic   hypertensive encephalopathy or prior traumatic brain injury  Correlation with clinical history recommended  No acute intracranial hemorrhage      -neurology consulted, appreciate recommendations will continue dual anti-platelet therapy for 21 days total, then aspirin 81 mg daily    Patient will follow-up with neurology in 6 weeks, will place outpatient referral      Paroxysmal atrial fibrillation [facility])  Assessment & Plan  Not on anticoagulation on outpatient basis    Malignant neoplasm of kidney [facility])  Assessment & Plan  Patient with 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) underwent radiation therapy 2012  -follows with H[facility]ology[facility] at [facility]    Generalized anxiety disorder  Assessment & Plan  Continue Zoloft 50 mg p o  Daily              Medical Problems             Resolved Problems  Date Reviewed: 1/11/2022    None                Admission Date:   Admission Orders (From admission, onward)     Ordered        01/10/22 1016  Place in Observation  Once                        Admitting Diagnosis: Stroke-like symptoms [R29 90]    Harvey Mccauley is a 77 y o  male with a PMH of history of malignant neoplasm kidney (with Mets to brain), paroxysmal Afib(not on [facility] [place] operated by [facility]), generalized anxiety disorder, who presents with stroke-like symptoms  Patient woke up at approximately 8:30 a m  With right-sided weakness, difficulty ambulating left-sided facial droop and dysarthria  Patient's symptoms resolved upon arrival to ED  CT imaging revealed no acute bleed however  Gyriform cortical hyperdensity involving parasagittal bilateral occipital lobes may represent laminar necrosis and subacute cortical infarct  Patient's NIHSS was 0, did not receive tPA     Neurology was consulted by ED physician who recommended admission under stroke pathway and further workup  Procedures Performed: No orders of the defined types were placed in this encounter  Summary of Hospital Course:  Patient was admitted on the stroke pathway neurology was consulted who recommended MRI  No acute infarcts noted on imaging  Neurology recommending discharge with dual anti-platelet therapy for 21 days total and then aspirin 81 mg daily  Patient will need follow-up with neurovascular attending in 6 weeks as per Neurology  Patient will be discharged home  Patient did not have any physical therapy needs at time of discharge  Patient will be discharged home    Patient advised follow-up with PCP within 7 days  Significant Findings, Care, Treatment and Services Provided:     1  No acute infarction, intracranial hemorrhage or mass effect  2   Moderate, chronic microangiopathy redemonstrated  3   Extensive multifocal blooming artifact throughout the supra and infratentorial brain redemonstrated, possibly related to amyloid angiopathy  Differential diagnosis includes multiple cavernous angiomas (hereditary) versus perhaps sequela of chronic   hypertensive encephalopathy or prior traumatic brain injury  Correlation with clinical history recommended  No acute intracranial hemorrhage  Complications: None    Condition at Discharge: good         Discharge instructions/Information to patient and family:   See after visit summary for information provided to patient and family  Provisions for Follow-Up Care:  See after visit summary for information related to follow-up care and any pertinent home health orders  PCP: Nir Weaver DO    Disposition: Home    Planned Readmission: No    Discharge Statement   I spent 39 minutes discharging the patient  This time was spent on the day of discharge  I had direct contact with the patient on the day of discharge  Additional documentation is required if more than 30 minutes were spent on discharge  Discharge Medications:  See after visit summary for reconciled discharge medications provided to patient and family        /64 (BP Location: Left arm)   Pulse 55   Temp (!) 97 1 °F (36 2 °C) (Temporal)   Resp 16   Ht 5' 8" (1 727 m)   Wt 81 6 kg (180 lb)   SpO2 96%   BMI 27 37 kg/m²     General Appearance:    Alert, cooperative, no distress, appears stated age   Head:    Normocephalic, without obvious abnormality, atraumatic   Eyes:    PERRL, conjunctiva/corneas clear, EOM's intact, fundi     benign, both eyes        Ears:    Normal TM's and external ear canals, both ears   Nose:   Nares normal, septum midline, mucosa normal, no drainage    or sinus tenderness   Throat:   Lips, mucosa, and tongue normal; teeth and gums normal   Neck:   Supple, symmetrical, trachea midline, no adenopathy;        thyroid:  No enlargement/tenderness/nodules; no carotid    bruit or JVD   Back:     Symmetric, no curvature, ROM normal, no CVA tenderness   Lungs:     Clear to auscultation bilaterally, respirations unlabored   Chest wall:    No tenderness or deformity   Heart:    Regular rate and rhythm, S1 and S2 normal, no murmur, rub    or gallop   Abdomen:     Soft, non-tender, bowel sounds active all four quadrants,     no masses, no organomegaly   Genitalia:    Normal male without lesion, discharge or tenderness   Rectal:    Normal tone, normal prostate, no masses or tenderness;    guaiac negative stool   Extremities:   Extremities normal, atraumatic, no cyanosis or edema   Pulses:   2+ and symmetric all extremities   Skin:   Skin color, texture, turgor normal, no rashes or lesions   Lymph nodes:   Cervical, supraclavicular, and axillary nodes normal   Neurologic:   CNII-XII intact   Normal strength, sensation and reflexes       throughout

## 2022-01-11 NOTE — SPEECH THERAPY NOTE
Speech Language/Pathology  Speech/Language Pathology  Assessment    Patient Name: Jennifer Canales  IYIYN'E Date: 1/11/2022     Problem List  Principal Problem:    Stroke-like symptoms  Active Problems:    Generalized anxiety disorder    Malignant neoplasm of kidney (HCC)    Paroxysmal atrial fibrillation [facility])    Past Medical History  Past Medical History:   Diagnosis Date    Abnormal eye finding     last assessed 08/18/2015    Anemia     Atrial fibrillation (HCC)     patient denies    Generalized anxiety disorder     History of kidney cancer     Hyperlipidemia     no longer    Hypertension     no longer     Past Surgical History  Past Surgical History:   Procedure Laterality Date    COLONOSCOPY N/A 3/10/2017    Procedure: COLONOSCOPY;  Surgeon: Nils Moritz, MD;  Location: [facility];  Service:    [facility]  childhood    TOOTH EXTRACTION  08/11/2021 01/11/22 0900   Patient Information   Current Medical Stroke pathway   Past Medical History HTN, hypothyrodism, renal CA with mets   Cognition   Overall Cognitive Status WFL   Arousal/Participation Alert; Responsive; Cooperative   Attention Within functional limits   Orientation Level Oriented X4   Memory Within functional limits   Following Commands Follows all commands and directions without difficulty   Speech/Swallow Mechanism Exam   Labial Symmetry WFL   Labial Strength WFL   Labial ROM WFL   Labial Sensation WFL   Facial Symmetry WFL   Facial Strength WFL   Facial ROM WFL   Facial Sensation WFL   Lingual Symmetry WFL   Lingual Strength WFL   Lingual ROM WFL   Lingual Sensation WFL   Dentition Adequate   Volitional Cough Strong   Motor Speech Evaluation   Respiration/Phonation WFL   Vocal Quality WFL   Dysarthria No   Intelligibility Intelligible   Auditory Comprehension   Word Level Comprehension WFL   Yes/No Questions WFL   Commands WFL   Reading Comprehension Reading Status WFL   Expression   Primary Mode of Expression Verbal   Verbal Expression   Repetition Sentences; No impairment   Automatic Speech WFL   Naming [facility]   Narrative Speech [facility]   Written Expression   Dominant Hand Right   Written Expression WFL   Cognitive/Language and Holistic Reasoning   Problem Solving WFL   Numeric Reasoning WFL   Abstract Reasoning WFL   Safety/Judgement WFL   Insight WFL   Flexibility of Thought WFL   Planning [facility]   Organization [facility]   Summary   Speech/Language Summary Patient received reclined in ED bed, patient reporting that yesterday he began to experience symptoms including slurred, dysarthric speech, resolving i'ly and mild confusion  Patient reports those episodes resolved i'ly  Patient was taken through the MAST evaluation, he tested [facility] for all areas including naming, automatic speech, repetition, yes/no responses, obj recognition, following instructions, reading instructions, verbal fluency dictation, and writing/spelling  Patient exhibiting no difficulties  Wife arriving at end of evaluation, all questions answered  Patient reports he would like to keep going for walks, encouraged to speak to his doctor about this desire

## 2022-01-11 NOTE — ASSESSMENT & PLAN NOTE
Patient woke up 1/10 approximately 8 30 a m  with difficulty ambulating, right-sided weakness, left-sided facial droop and dysarthria  At time of presentation to ED symptoms have resolved  NIHSS 0  Patient not candidate for tPA  CTA head and neck revealed:   1  Gyriform cortical hyperdensity involving parasagittal bilateral occipital lobes may represent laminar necrosis and subacute cortical infarct  2   Small focal hyperdensity in the right postcentral gyrus corresponding to focal hemosiderin deposition seen on MRI 9/19/2019 may represent a cavernoma  3   Chronic microangiopathy  Neurology contacted by ED physician who recommended placement of stroke pathway for further workup    -will admit patient to stroke pathway  -echo normal EF, diastolic dysfunction  -MRI brain revealed:    1  No acute infarction, intracranial hemorrhage or mass effect  2   Moderate, chronic microangiopathy redemonstrated  3   Extensive multifocal blooming artifact throughout the supra and infratentorial brain redemonstrated, possibly related to amyloid angiopathy  Differential diagnosis includes multiple cavernous angiomas (hereditary) versus perhaps sequela of chronic   hypertensive encephalopathy or prior traumatic brain injury  Correlation with clinical history recommended  No acute intracranial hemorrhage      -neurology consulted, appreciate recommendations will continue dual anti-platelet therapy for 21 days total, then aspirin 81 mg daily    As per Neurology, patient will require follow-up with neurovascular attending 6 weeks on outpatient basis

## 2022-01-13 ENCOUNTER — TRANSITIONAL CARE MANAGEMENT (OUTPATIENT)
Dept: INTERNAL MEDICINE CLINIC | Facility: CLINIC | Age: 67
End: 2022-01-13

## 2022-01-13 NOTE — PROGRESS NOTES
I called pt at 11:35 am  His wife answered the phone and she asked him if he wanted to schedule a seth  He didn't want to schedule appt at this time

## 2022-01-14 ENCOUNTER — TELEPHONE (OUTPATIENT)
Dept: INTERNAL MEDICINE CLINIC | Facility: CLINIC | Age: 67
End: 2022-01-14

## 2022-01-20 ENCOUNTER — OFFICE VISIT (OUTPATIENT)
Dept: INTERNAL MEDICINE CLINIC | Facility: CLINIC | Age: 67
End: 2022-01-20
Payer: MEDICARE

## 2022-01-20 VITALS
WEIGHT: 177.38 LBS | BODY MASS INDEX: 26.88 KG/M2 | HEART RATE: 63 BPM | SYSTOLIC BLOOD PRESSURE: 130 MMHG | DIASTOLIC BLOOD PRESSURE: 60 MMHG | TEMPERATURE: 96.9 F | OXYGEN SATURATION: 98 % | HEIGHT: 68 IN

## 2022-01-20 DIAGNOSIS — I10 BENIGN ESSENTIAL HYPERTENSION: ICD-10-CM

## 2022-01-20 DIAGNOSIS — E83.51 HYPOCALCEMIA: ICD-10-CM

## 2022-01-20 DIAGNOSIS — E87.6 HYPOKALEMIA: ICD-10-CM

## 2022-01-20 DIAGNOSIS — R29.90 STROKE-LIKE SYMPTOMS: Primary | ICD-10-CM

## 2022-01-20 DIAGNOSIS — R93.0 ABNORMAL MRI OF HEAD: ICD-10-CM

## 2022-01-20 DIAGNOSIS — C79.31 METASTASIS TO BRAIN (HCC): ICD-10-CM

## 2022-01-20 DIAGNOSIS — E78.2 MIXED HYPERLIPIDEMIA: ICD-10-CM

## 2022-01-20 DIAGNOSIS — D64.9 ANEMIA, UNSPECIFIED TYPE: ICD-10-CM

## 2022-01-20 DIAGNOSIS — E88.09 SERUM ALBUMIN DECREASED: ICD-10-CM

## 2022-01-20 PROCEDURE — 99495 TRANSJ CARE MGMT MOD F2F 14D: CPT | Performed by: INTERNAL MEDICINE

## 2022-01-20 RX ORDER — RIBOFLAVIN (VITAMIN B2) 100 MG
100 TABLET ORAL DAILY
COMMUNITY

## 2022-01-20 NOTE — PATIENT INSTRUCTIONS
Weight Management   AMBULATORY CARE:   Why it is important to manage your weight:  Being overweight increases your risk of health conditions such as heart disease, high blood pressure, type 2 diabetes, and certain types of cancer  It can also increase your risk for osteoarthritis, sleep apnea, and other respiratory problems  Aim for a slow, steady weight loss  Even a small amount of weight loss can lower your risk of health problems  How to lose weight safely:  A safe and healthy way to lose weight is to eat fewer calories and get regular exercise  · You can lose up about 1 pound a week by decreasing the number of calories you eat by 500 calories each day  You can decrease calories by eating smaller portion sizes or by cutting out high-calorie foods  Read labels to find out how many calories are in the foods you eat  · You can also burn calories with exercise such as walking, swimming, or biking  You will be more likely to keep weight off if you make these changes part of your lifestyle  Exercise at least 30 minutes per day on most days of the week  You can also fit in more physical activity by taking the stairs instead of the elevator or parking farther away from stores  Ask your healthcare provider about the best exercise plan for you  Healthy meal plan for weight management:  A healthy meal plan includes a variety of foods, contains fewer calories, and helps you stay healthy  A healthy meal plan includes the following:     · Eat whole-grain foods more often  A healthy meal plan should contain fiber  Fiber is the part of grains, fruits, and vegetables that is not broken down by your body  Whole-grain foods are healthy and provide extra fiber in your diet  Some examples of whole-grain foods are whole-wheat breads and pastas, oatmeal, brown rice, and bulgur  · Eat a variety of vegetables every day  Include dark, leafy greens such as spinach, kale, lynsday greens, and mustard greens   Eat yellow and orange vegetables such as carrots, sweet potatoes, and winter squash  · Eat a variety of fruits every day  Choose fresh or canned fruit (canned in its own juice or light syrup) instead of juice  Fruit juice has very little or no fiber  · Eat low-fat dairy foods  Drink fat-free (skim) milk or 1% milk  Eat fat-free yogurt and low-fat cottage cheese  Try low-fat cheeses such as mozzarella and other reduced-fat cheeses  · Choose meat and other protein foods that are low in fat  Choose beans or other legumes such as split peas or lentils  Choose fish, skinless poultry (chicken or turkey), or lean cuts of red meat (beef or pork)  Before you cook meat or poultry, cut off any visible fat  · Use less fat and oil  Try baking foods instead of frying them  Add less fat, such as margarine, sour cream, regular salad dressing and mayonnaise to foods  Eat fewer high-fat foods  Some examples of high-fat foods include french fries, doughnuts, ice cream, and cakes  · Eat fewer sweets  Limit foods and drinks that are high in sugar  This includes candy, cookies, regular soda, and sweetened drinks  Ways to decrease calories:   · Eat smaller portions  ? Use a small plate with smaller servings  ? Do not eat second helpings  ? When you eat at a restaurant, ask for a box and place half of your meal in the box before you eat  ? Share an entrée with someone else  · Replace high-calorie snacks with healthy, low-calorie snacks  ? Choose fresh fruit, vegetables, fat-free rice cakes, or air-popped popcorn instead of potato chips, nuts, or chocolate  ? Choose water or calorie-free drinks instead of soda or sweetened drinks  · Do not shop for groceries when you are hungry  You may be more likely to make unhealthy food choices  Take a grocery list of healthy foods and shop after you have eaten  · Eat regular meals  Do not skip meals  Skipping meals can lead to overeating later in the day  This can make it harder for you to lose weight  Eat a healthy snack in place of a meal if you do not have time to eat a regular meal  Talk with a dietitian to help you create a meal plan and schedule that is right for you  Other things to consider as you try to lose weight:   · Be aware of situations that may give you the urge to overeat, such as eating while watching television  Find ways to avoid these situations  For example, read a book, go for a walk, or do crafts  · Meet with a weight loss support group or friends who are also trying to lose weight  This may help you stay motivated to continue working on your weight loss goals  © Copyright [employer] 2021 Information is for End User's use only and may not be sold, redistributed or otherwise used for commercial purposes  All illustrations and images included in CareNotes® are the copyrighted property of A D A M [employer] Inc  or Maddie Arnold   The above information is an  only  It is not intended as medical advice for individual conditions or treatments  Talk to your doctor, nurse or pharmacist before following any medical regimen to see if it is safe and effective for you  Low Fat Diet   AMBULATORY CARE:   A low-fat diet  is an eating plan that is low in total fat, unhealthy fat, and cholesterol  You may need to follow a low-fat diet if you have trouble digesting or absorbing fat  You may also need to follow this diet if you have high cholesterol  You can also lower your cholesterol by increasing the amount of fiber in your diet  Soluble fiber is a type of fiber that helps to decrease cholesterol levels  Different types of fat in food:   · Limit unhealthy fats  A diet that is high in cholesterol, saturated fat, and trans fat may cause unhealthy cholesterol levels  Unhealthy cholesterol levels increase your risk of heart disease  ? Cholesterol:  Limit intake of cholesterol to less than 200 mg per day   Cholesterol is found in meat, eggs, and dairy  ? Saturated fat:  Limit saturated fat to less than 7% of your total daily calories  Ask your dietitian how many calories you need each day  Saturated fat is found in butter, cheese, ice cream, whole milk, and palm oil  Saturated fat is also found in meat, such as beef, pork, chicken skin, and processed meats  Processed meats include sausage, hot dogs, and bologna  ? Trans fat:  Avoid trans fat as much as possible  Trans fat is used in fried and baked foods  Foods that say trans fat free on the label may still have up to 0 5 grams of trans fat per serving  · Include healthy fats  Replace foods that are high in saturated and trans fat with foods high in healthy fats  This may help to decrease high cholesterol levels  ? Monounsaturated fats: These are found in avocados, nuts, and vegetable oils, such as olive, canola, and sunflower oil  ? Polyunsaturated fats: These can be found in vegetable oils, such as soybean or corn oil  Omega-3 fats can help to decrease the risk of heart disease  Omega-3 fats are found in fish, such as salmon, herring, trout, and tuna  Omega-3 fats can also be found in plant foods, such as walnuts, flaxseed, soybeans, and canola oil  Foods to limit or avoid:   · Grains:      ? Snacks that are made with partially hydrogenated oils, such as chips, regular crackers, and butter-flavored popcorn    ? High-fat baked goods, such as biscuits, croissants, doughnuts, pies, cookies, and pastries    · Dairy:      ? Whole milk, 2% milk, and yogurt and ice cream made with whole milk    ? Half and half creamer, heavy cream, and whipping cream    ? Cheese, cream cheese, and sour cream    · Meats and proteins:      ? High-fat cuts of meat (T-bone steak, regular hamburger, and ribs)    ? Fried meat, poultry (turkey and chicken), and fish    ? Poultry (chicken and turkey) with skin    ? Cold cuts (salami or bologna), hot dogs, mejia, and sausage    ?  Whole eggs and egg yolks    · Vegetables and fruits with added fat:      ? Fried vegetables or vegetables in butter or high-fat sauces, such as cream or cheese sauces    ? Fried fruit or fruit served with butter or cream    · Fats:      ? Butter, stick margarine, and shortening    ? Coconut, palm oil, and palm kernel oil    Foods to include:   · Grains:      ? Whole-grain breads, cereals, pasta, and brown rice    ? Low-fat crackers and pretzels    · Vegetables and fruits:      ? Fresh, frozen, or canned vegetables (no salt or low-sodium)    ? Fresh, frozen, dried, or canned fruit (canned in light syrup or fruit juice)    ? Avocado    · Low-fat dairy products:      ? Nonfat (skim) or 1% milk    ? Nonfat or low-fat cheese, yogurt, and cottage cheese    · Meats and proteins:      ? Chicken or turkey with no skin    ? Baked or broiled fish    ? Lean beef and pork (loin, round, extra lean hamburger)    ? Beans and peas, unsalted nuts, soy products    ? Egg whites and substitutes    ? Seeds and nuts    · Fats:      ? Unsaturated oil, such as canola, olive, peanut, soybean, or sunflower oil    ? Soft or liquid margarine and vegetable oil spread    ? Low-fat salad dressing    Other ways to decrease fat:   · Read food labels before you buy foods  Choose foods that have less than 30% of calories from fat  Choose low-fat or fat-free dairy products  Remember that fat free does not mean calorie free  These foods still contain calories, and too many calories can lead to weight gain  · Trim fat from meat and avoid fried food  Trim all visible fat from meat before you cook it  Remove the skin from poultry  Do not abel meat, fish, or poultry  Bake, roast, boil, or broil these foods instead  Avoid fried foods  Eat a baked potato instead of [place] fries  Steam vegetables instead of sautéing them in butter  · Add less fat to foods  Use imitation mejia bits on salads and baked potatoes instead of regular mejia bits   Use fat-free or low-fat salad dressings instead of regular dressings  Use low-fat or nonfat butter-flavored topping instead of regular butter or margarine on popcorn and other foods  Ways to decrease fat in recipes:  Replace high-fat ingredients with low-fat or nonfat ones  This may cause baked goods to be drier than usual  You may need to use nonfat cooking spray on pans to prevent food from sticking  You also may need to change the amount of other ingredients, such as water, in the recipe  Try the following:  · Use low-fat or light margarine instead of regular margarine or shortening  · Use lean ground turkey breast or chicken, or lean ground beef (less than 5% fat) instead of hamburger  · Add 1 teaspoon of canola oil to 8 ounces of skim milk instead of using cream or half and half  · Use grated zucchini, carrots, or apples in breads instead of coconut  · Use blenderized, low-fat cottage cheese, plain tofu, or low-fat ricotta cheese instead of cream cheese  · Use 1 egg white and 1 teaspoon of canola oil, or use ¼ cup (2 ounces) of fat-free egg substitute instead of a whole egg  · Replace half of the oil that is called for in a recipe with applesauce when you bake  Use 3 tablespoons of cocoa powder and 1 tablespoon of canola oil instead of a square of baking chocolate  How to increase fiber:  Eat enough high-fiber foods to get 20 to 30 grams of fiber every day  Slowly increase your fiber intake to avoid stomach cramps, gas, and other problems  · Eat 3 ounces of whole-grain foods each day  An ounce is about 1 slice of bread  Eat whole-grain breads, such as whole-wheat bread  Whole wheat, whole-wheat flour, or other whole grains should be listed as the first ingredient on the food label  Replace white flour with whole-grain flour or use half of each in recipes  Whole-grain flour is heavier than white flour, so you may have to add more yeast or baking powder       · Eat a high-fiber cereal for breakfast  Oatmeal is a good source of soluble fiber  Look for cereals that have bran or fiber in the name  Choose whole-grain products, such as brown rice, barley, and whole-wheat pasta  · Eat more beans, peas, and lentils  For example, add beans to soups or salads  Eat at least 5 cups of fruits and vegetables each day  Eat fruits and vegetables with the peel because the peel is high in fiber  © Copyright [employer] 2021 Information is for End User's use only and may not be sold, redistributed or otherwise used for commercial purposes  All illustrations and images included in CareNotes® are the copyrighted property of A D A M , Inc  or [address] [place], [place] [id number] elliot   The above information is an  only  It is not intended as medical advice for individual conditions or treatments  Talk to your doctor, nurse or pharmacist before following any medical regimen to see if it is safe and effective for you  Heart Healthy Diet   AMBULATORY CARE:   A heart healthy diet  is an eating plan low in unhealthy fats and sodium (salt)  The plan is high in healthy fats and fiber  A heart healthy diet helps improve your cholesterol levels and lowers your risk for heart disease and stroke  A dietitian will teach you how to read and understand food labels  Heart healthy diet guidelines to follow:   · Choose foods that contain healthy fats  ? Unsaturated fats  include monounsaturated and polyunsaturated fats  Unsaturated fat is found in foods such as soybean, canola, olive, corn, and safflower oils  It is also found in soft tub margarine that is made with liquid vegetable oil  ? Omega-3 fat  is found in certain fish, such as salmon, tuna, and trout, and in walnuts and flaxseed  Eat fish high in omega-3 fats at least 2 times a week  · Get 20 to 30 grams of fiber each day  Fruits, vegetables, whole-grain foods, and legumes (cooked beans) are good sources of fiber  · Limit or do not have unhealthy fats  ? Cholesterol  is found in animal foods, such as eggs and lobster, and in dairy products made from whole milk  Limit cholesterol to less than 200 mg each day  ? Saturated fat  is found in meats, such as mejia and hamburger  It is also found in chicken or turkey skin, whole milk, and butter  Limit saturated fat to less than 7% of your total daily calories  ? Trans fat  is found in packaged foods, such as potato chips and cookies  It is also in hard margarine, some fried foods, and shortening  Do not eat foods that contain trans fats  · Limit sodium as directed  You may be told to limit sodium to 2,000 to 2,300 mg each day  Choose low-sodium or no-salt-added foods  Add little or no salt to food you prepare  Use herbs and spices in place of salt  Include the following in your heart healthy plan:  Ask your dietitian or healthcare provider how many servings to have from each of the following food groups:  · Grains:      ? Whole-wheat breads, cereals, and pastas, and brown rice    ? Low-fat, low-sodium crackers and chips    · Vegetables:      ? Broccoli, green beans, green peas, and spinach    ? Collards, kale, and lima beans    ? Carrots, sweet potatoes, tomatoes, and peppers    ? Canned vegetables with no salt added    · Fruits:      ? Bananas, peaches, pears, and pineapple    ? Grapes, raisins, and dates    ? Oranges, tangerines, grapefruit, orange juice, and grapefruit juice    ? Apricots, mangoes, melons, and papaya    ? Raspberries and strawberries    ? Canned fruit with no added sugar    · Low-fat dairy:      ? Nonfat (skim) milk, 1% milk, and low-fat almond, cashew, or soy milks fortified with calcium    ? Low-fat cheese, regular or frozen yogurt, and cottage cheese    · Meats and proteins:      ? Lean cuts of beef and pork (loin, leg, round), skinless chicken and turkey    ?  Legumes, soy products, egg whites, or nuts    Limit or do not include the following in your heart healthy plan: · Unhealthy fats and oils:      ? Whole or 2% milk, cream cheese, sour cream, or cheese    ? High-fat cuts of beef (T-bone steaks, ribs), chicken or turkey with skin, and organ meats such as liver    ? Butter, stick margarine, shortening, and cooking oils such as coconut or palm oil    · Foods and liquids high in sodium:      ? Packaged foods, such as frozen dinners, cookies, macaroni and cheese, and cereals with more than 300 mg of sodium per serving    ? Vegetables with added sodium, such as instant potatoes, vegetables with added sauces, or regular canned vegetables    ? Cured or smoked meats, such as hot dogs, mejia, and sausage    ? High-sodium ketchup, barbecue sauce, salad dressing, pickles, olives, soy sauce, or miso    · Foods and liquids high in sugar:      ? Candy, cake, cookies, pies, or doughnuts    ? Soft drinks (soda), sports drinks, or sweetened tea    ? Canned or dry mixes for cakes, soups, sauces, or gravies    Other healthy heart guidelines:   · Do not smoke  Nicotine and other chemicals in cigarettes and cigars can cause lung and heart damage  Ask your healthcare provider for information if you currently smoke and need help to quit  E-cigarettes or smokeless tobacco still contain nicotine  Talk to your healthcare provider before you use these products  · Limit or do not drink alcohol as directed  Alcohol can damage your heart and raise your blood pressure  Your healthcare provider may give you specific daily and weekly limits  The general recommended limit is 1 drink a day for women 21 or older and for men 72 or older  Do not have more than 3 drinks in a day or 7 in a week  The recommended limit is 2 drinks a day for men 24to 59years of age  Do not have more than 4 drinks in a day or 14 in a week  A drink of alcohol is 12 ounces of beer, 5 ounces of wine, or 1½ ounces of liquor  · Exercise regularly    Exercise can help you maintain a healthy weight and improve your blood pressure and cholesterol levels  Regular exercise can also decrease your risk for heart problems  Ask your healthcare provider about the best exercise plan for you  Do not start an exercise program without asking your healthcare provider  Follow up with your doctor or cardiologist as directed:  Write down your questions so you remember to ask them during your visits  © [employer] 2021 Information is for End User's use only and may not be sold, redistributed or otherwise used for commercial purposes  All illustrations and images included in CareNotes® are the copyrighted property of A D A M , Inc  or [facility] Becca Arnold   The above information is an  only  It is not intended as medical advice for individual conditions or treatments  Talk to your doctor, nurse or pharmacist before following any medical regimen to see if it is safe and effective for you  Chronic Hypertension   AMBULATORY CARE:   Hypertension  is high blood pressure  Your blood pressure is the force of your blood moving against the walls of your arteries  Hypertension causes your blood pressure to get so high that your heart has to work much harder than normal  This can damage your heart  Even if you have hypertension for years, lifestyle changes, medicines, or both can help bring your blood pressure to normal   Call your local emergency number (911 in the [address] [facility]) or have someone call if:   · You have chest pain  · You have any of the following signs of a heart attack:      ? Squeezing, pressure, or pain in your chest    ? You may  also have any of the following:     § Discomfort or pain in your back, neck, jaw, stomach, or arm    § Shortness of breath    § Nausea or vomiting    § Lightheadedness or a sudden cold sweat    · You become confused or have difficulty speaking  · You suddenly feel lightheaded or have trouble breathing  Seek care immediately if:   · You have a severe headache or vision loss      · You have weakness in an arm or leg  Call your doctor or cardiologist if:   · You feel faint, dizzy, confused, or drowsy  · You have been taking your blood pressure medicine but your pressure is higher than your provider says it should be  · You have questions or concerns about your condition or care  Treatment for chronic hypertension  may include medicine to lower your blood pressure and cholesterol levels  A low cholesterol level helps prevent heart disease and makes it easier to control your blood pressure  Heart disease can make your blood pressure harder to control  You may also need to make lifestyle changes  What you need to know about the stages of hypertension:       · Normal blood pressure is 119/79 or lower   Your healthcare provider may only check your blood pressure each year if it stays at a normal level  · Elevated blood pressure is 120/79 to 129/79   This is sometimes called prehypertension  Your healthcare provider may suggest lifestyle changes to help lower your blood pressure to a normal level  He or she may then check it again in 3 to 6 months  · Stage 1 hypertension is 130/80  to 139/89   Your provider may recommend lifestyle changes, medication, and checks every 3 to 6 months until your blood pressure is controlled  · Stage 2 hypertension is 140/90 or higher   Your provider will recommend lifestyle changes and have you take 2 kinds of hypertension medicines  You will also need to have your blood pressure checked monthly until it is controlled  Manage chronic hypertension:   · Check your blood pressure at home  Avoid smoking, caffeine, and exercise at least 30 minutes before checking your blood pressure  Sit and rest for 5 minutes before you take your blood pressure  Extend your arm and support it on a flat surface  Your arm should be at the same level as your heart  Follow the directions that came with your blood pressure monitor   Check your blood pressure 2 times, 1 minute apart, before you take your medicine in the morning  Also check your blood pressure before your evening meal  Keep a record of your readings and bring it to your follow-up visits  Ask your healthcare provider what your blood pressure should be  · Manage any other health conditions you have  Health conditions such as diabetes can increase your risk for hypertension  Follow your healthcare provider's instructions and take all your medicines as directed  Talk to your healthcare provider about any new health conditions you have recently developed  · Ask about all medicines  Certain medicines can increase your blood pressure  Examples include oral birth control pills, decongestants, herbal supplements, and NSAIDs, such as ibuprofen  Your healthcare provider can tell you which medicines are safe for you to take  This includes prescription and over-the-counter medicines  Lifestyle changes you can make to lower your blood pressure: Your provider may want you to make more lifestyle changes if you are having trouble controlling your blood pressure  This may feel difficult over time, especially if you think you are making good changes but your pressure is still high  It might help to focus on one new change at a time  For example, try to add 1 more day of exercise, or exercise for an extra 10 minutes on 2 days  Small changes can make a big difference  Your healthcare provider can also refer you to specialists such as a dietitian who can help you make small changes  Your family members may be included in helping you learn to create lifestyle changes, such as the following:     · Limit sodium (salt) as directed  Too much sodium can affect your fluid balance  Check labels to find low-sodium or no-salt-added foods  Some low-sodium foods use potassium salts for flavor  Too much potassium can also cause health problems  Your healthcare provider will tell you how much sodium and potassium are safe for you to have in a day   He or she may recommend that you limit sodium to 2,300 mg a day  · Follow the meal plan recommended by your healthcare provider  A dietitian or your provider can give you more information on low-sodium plans or the DASH (Dietary Approaches to Stop Hypertension) eating plan  The DASH plan is low in sodium, processed sugar, unhealthy fats, and total fat  It is high in potassium, calcium, and fiber  These can be found in vegetables, fruit, and whole-grain foods  · Be physically active throughout the day  Physical activity, such as exercise, can help control your blood pressure and your weight  Be physically active for at least 30 minutes per day, on most days of the week  Include aerobic activity, such as walking or riding a bicycle  Also include strength training at least 2 times each week  Your healthcare providers can help you create a physical activity plan  · Decrease stress  This may help lower your blood pressure  Learn ways to relax, such as deep breathing or listening to music  · Limit alcohol as directed  Alcohol can increase your blood pressure  A drink of alcohol is 12 ounces of beer, 5 ounces of wine, or 1½ ounces of liquor  · Do not smoke  Nicotine and other chemicals in cigarettes and cigars can increase your blood pressure and also cause lung damage  Ask your healthcare provider for information if you currently smoke and need help to quit  E-cigarettes or smokeless tobacco still contain nicotine  Talk to your healthcare provider before you use these products  Follow up with your doctor or cardiologist as directed: You will need to return to have your blood pressure checked and to have other lab tests done  Write down your questions so you remember to ask them during your visits  © [employer] 2021 Information is for End User's use only and may not be sold, redistributed or otherwise used for commercial purposes   All illustrations and images included in [facility] are the copyrighted property of [employer]  or [address] [place], [place] [address]   The above information is an  only  It is not intended as medical advice for individual conditions or treatments  Talk to your doctor, nurse or pharmacist before following any medical regimen to see if it is safe and effective for you

## 2022-01-20 NOTE — PROGRESS NOTES
BMI Counseling: Body mass index is 26 97 kg/m²  The BMI is above normal  Nutrition recommendations include decreasing portion sizes and encouraging healthy choices of fruits and vegetables  Exercise recommendations include moderate physical activity 150 minutes/week  No pharmacotherapy was ordered  Rationale for BMI follow-up plan is due to patient being overweight or obese  Assessment/Plan:     No problem-specific Assessment & Plan notes found for this encounter  Diagnoses and all orders for this visit:    Stroke-like symptoms  -     Ambulatory Referral to Neurology; Future    Mixed hyperlipidemia    Metastasis to brain [facility])  -     Ambulatory Referral to Neurology; Future    Benign essential hypertension    Abnormal MRI of head  -     Ambulatory Referral to Neurology; Future    Hypocalcemia  -     Comprehensive metabolic panel; Future    Serum albumin decreased  -     Comprehensive metabolic panel; Future    Hypokalemia  -     Comprehensive metabolic panel; Future    Anemia, unspecified type  -     Hemoglobin and hematocrit, blood; Future    Other orders  -     Ascorbic Acid (vitamin C) 100 MG tablet; Take 100 mg by mouth daily     A/P: Doing well and back to baseline  Tolerating the meds  Discussed the imaging studies  Given the MRI changes, ? result of his past mets to the brain  Will refer to neuro for second opinion  Continue to modify his risks  Pt concerned about his sBP at 130 and discussed the need to keep it between 120 and 140  Will recheck labs  Continue current treatment and RTC three months for routine  Subjective:     Patient ID: Liban Champagne is a 77 y o  male  WM presents for short admission for slurred speech, weakness, etc  Admitted and final dx uncertain, but acted like a TIA  W/u including MRI, CTA, echo, etc with unremarkable for acute changes  Seen by neuro  Added plavix and liptor short term to ASA  D/c to home   Course relatively uncomplicated except for slight anemia, hypokalemia, and decrease calcium  MRI with some amyloid changes  Since d/c, no further events  Appetite and activity level back to baseline  No fever or chills  Tolerating his meds  NO c/o's  Review of Systems   Constitutional: Negative for activity change, chills, diaphoresis, fatigue and fever  HENT: Negative  Eyes: Negative for visual disturbance  Respiratory: Negative for cough, chest tightness, shortness of breath and wheezing  Cardiovascular: Negative for chest pain, palpitations and leg swelling  Gastrointestinal: Negative for abdominal pain, constipation, diarrhea, nausea and vomiting  Endocrine: Negative for cold intolerance and heat intolerance  Genitourinary: Negative for difficulty urinating, dysuria and frequency  Musculoskeletal: Negative for arthralgias, gait problem and myalgias  Neurological: Negative for dizziness, seizures, syncope, weakness, light-headedness and headaches  Psychiatric/Behavioral: Negative for confusion, dysphoric mood and sleep disturbance  The patient is not nervous/anxious  Objective:     Physical Exam  Vitals and nursing note reviewed  Constitutional:       General: He is not in acute distress  Appearance: Normal appearance  He is not ill-appearing  HENT:      Head: Normocephalic and atraumatic  Mouth/Throat:      Mouth: Mucous membranes are moist    Eyes:      Extraocular Movements: Extraocular movements intact  Conjunctiva/sclera: Conjunctivae normal       Pupils: Pupils are equal, round, and reactive to light  Cardiovascular:      Rate and Rhythm: Normal rate and regular rhythm  Heart sounds: Normal heart sounds  Pulmonary:      Effort: Pulmonary effort is normal  No respiratory distress  Breath sounds: Normal breath sounds  No wheezing or rales  Abdominal:      General: Bowel sounds are normal  There is no distension  Palpations: Abdomen is soft  Tenderness: There is no abdominal tenderness  Musculoskeletal:      Right lower leg: No edema  Left lower leg: No edema  Neurological:      General: No focal deficit present  Mental Status: He is alert and oriented to person, place, and time  Mental status is at baseline  Psychiatric:         Mood and Affect: Mood normal          Behavior: Behavior normal          Thought Content: Thought content normal          Judgment: Judgment normal            Vitals:    01/20/22 1400   BP: 130/60   Pulse: 63   Temp: (!) 96 9 °F (36 1 °C)   SpO2: 98%   Weight: 80 5 kg (177 lb 6 oz)   Height: 5' 8" (1 727 m)       Transitional Care Management Review:  Lindsey Fonseca is a 77 y o  male here for TCM follow up  During the TCM phone call patient stated:    TCM Call (since 12/20/2021)     Date and time call was made  1/14/2022 10:30 AM    Hospital care reviewed  Records reviewed        Patient was hospitialized at  [address]        Date of Admission  01/10/22    Date of discharge  01/11/22    Diagnosis  Stroke like Symptoms    Disposition  Home    Were the patients medications reviewed and updated  Yes    Current Symptoms  None      TCM Call (since 12/20/2021)     Post hospital issues  None    Should patient be enrolled in anticoag monitoring? No    Scheduled for follow up? Yes    Did you obtain your prescribed medications  Yes    Do you need help managing your prescriptions or medications  No    Is transportation to your appointment needed  No    I have advised the patient to call PCP with any new or worsening symptoms  carolina Echevarria Apo, DO      BMI Counseling: Body mass index is 26 97 kg/m²  The BMI is above normal  Nutrition recommendations include reducing portion sizes and decreasing overall calorie intake  Exercise recommendations include moderate aerobic physical activity for 150 minutes/week and vigorous aerobic physical activity for 75 minutes/week

## 2022-01-21 ENCOUNTER — TELEPHONE (OUTPATIENT)
Dept: NEUROLOGY | Facility: CLINIC | Age: 67
End: 2022-01-21

## 2022-01-21 NOTE — TELEPHONE ENCOUNTER
[facility]/STROKE LIKE SYMPTOMS        NOTES:    Recommendations for outpatient neurological follow up have yet to be determined      TRIAGED W/[facility]:  Patient can be seen by neuro vascular attending in 6 weeks  12:23 PM  20 days left  ok i scheduled w/patricia when dr Arun Hoff will be in the office as well  is this ok? ty! I think it is a bit of an unclear case, but if Dr Margaret Jackson can definitely discuss the patient with [place] I guess thats okay  12:28 PM  20 days left          SCHEDULED:  3/4/22 @ 11:15AM Nohemi SHIPLEY IN [facility]  DR Maulik Cook WILL BE IN THE OFFICE SAME DAY/TIME  SENT APPT CARD/DIRECTIONS

## 2022-01-24 NOTE — TELEPHONE ENCOUNTER
From: Bronwyn Rasmussen PA-C  Sent: 1/21/2022   2:26 PM EST  To: Fior Pryor  Subject: RE: HFU                                          That's fine  Thank you for asking  ----- Message -----  From: Fior Pryor  Sent: 1/21/2022  12:31 PM EST  To: Bronwyn Rasmussen PA-C  Subject: Lory Sink,     Just checking if you would be ok in seeing this patient? Dr Randall Orozco will be in, on the date the patient is scheduled   Thank you kindly!          -Montell Gosselin

## 2022-01-31 DIAGNOSIS — F41.9 ANXIETY: ICD-10-CM

## 2022-02-15 ENCOUNTER — APPOINTMENT (OUTPATIENT)
Dept: LAB | Facility: CLINIC | Age: 67
End: 2022-02-15
Payer: MEDICARE

## 2022-02-15 DIAGNOSIS — E87.6 HYPOKALEMIA: ICD-10-CM

## 2022-02-15 DIAGNOSIS — E83.51 HYPOCALCEMIA: ICD-10-CM

## 2022-02-15 DIAGNOSIS — D64.9 ANEMIA, UNSPECIFIED TYPE: ICD-10-CM

## 2022-02-15 DIAGNOSIS — E88.09 SERUM ALBUMIN DECREASED: ICD-10-CM

## 2022-02-15 LAB
ALBUMIN SERPL BCP-MCNC: 4.3 G/DL (ref 3.5–5)
ALP SERPL-CCNC: 104 U/L (ref 46–116)
ALT SERPL W P-5'-P-CCNC: 26 U/L (ref 12–78)
ANION GAP SERPL CALCULATED.3IONS-SCNC: 3 MMOL/L (ref 4–13)
AST SERPL W P-5'-P-CCNC: 16 U/L (ref 5–45)
BILIRUB SERPL-MCNC: 0.81 MG/DL (ref 0.2–1)
BUN SERPL-MCNC: 23 MG/DL (ref 5–25)
CALCIUM SERPL-MCNC: 9.9 MG/DL (ref 8.3–10.1)
CHLORIDE SERPL-SCNC: 105 MMOL/L (ref 100–108)
CO2 SERPL-SCNC: 29 MMOL/L (ref 21–32)
CREAT SERPL-MCNC: 1 MG/DL (ref 0.6–1.3)
GFR SERPL CREATININE-BSD FRML MDRD: 78 ML/MIN/1.73SQ M
GLUCOSE P FAST SERPL-MCNC: 86 MG/DL (ref 65–99)
HCT VFR BLD AUTO: 44.8 % (ref 36.5–49.3)
HGB BLD-MCNC: 14.2 G/DL (ref 12–17)
POTASSIUM SERPL-SCNC: 4.4 MMOL/L (ref 3.5–5.3)
PROT SERPL-MCNC: 8 G/DL (ref 6.4–8.2)
SODIUM SERPL-SCNC: 137 MMOL/L (ref 136–145)

## 2022-02-15 PROCEDURE — 36415 COLL VENOUS BLD VENIPUNCTURE: CPT

## 2022-02-15 PROCEDURE — 85014 HEMATOCRIT: CPT

## 2022-02-15 PROCEDURE — 85018 HEMOGLOBIN: CPT

## 2022-02-15 PROCEDURE — 80053 COMPREHEN METABOLIC PANEL: CPT

## 2022-03-04 ENCOUNTER — OFFICE VISIT (OUTPATIENT)
Dept: NEUROLOGY | Facility: CLINIC | Age: 67
End: 2022-03-04
Payer: MEDICARE

## 2022-03-04 VITALS
BODY MASS INDEX: 26.65 KG/M2 | TEMPERATURE: 98.2 F | WEIGHT: 175.3 LBS | SYSTOLIC BLOOD PRESSURE: 130 MMHG | HEART RATE: 60 BPM | DIASTOLIC BLOOD PRESSURE: 78 MMHG

## 2022-03-04 DIAGNOSIS — G45.9 TIA (TRANSIENT ISCHEMIC ATTACK): ICD-10-CM

## 2022-03-04 DIAGNOSIS — I48.0 PAROXYSMAL ATRIAL FIBRILLATION (HCC): ICD-10-CM

## 2022-03-04 DIAGNOSIS — E78.2 MIXED HYPERLIPIDEMIA: Primary | ICD-10-CM

## 2022-03-04 DIAGNOSIS — C79.31 METASTASIS TO BRAIN (HCC): ICD-10-CM

## 2022-03-04 PROCEDURE — 99215 OFFICE O/P EST HI 40 MIN: CPT | Performed by: PHYSICIAN ASSISTANT

## 2022-03-04 NOTE — ASSESSMENT & PLAN NOTE
· Presented with dysarthria, left facial droop, right upper extremity weakness lasting approximately 10 minutes with self resolution  · Imaging negative for acute infarct  · Etiology concerning for TIA  · Completed on DAPT x 21 days followed by aspirin monotherapy   He is compliant with his aspirin without significant side effects  · BP goal <130/80  · LDL goal <100  · HgbA1C <7 0  · Defer to PCP for medical management  · Avoid NSAIDS    Follow up PRN

## 2022-03-04 NOTE — PROGRESS NOTES
[address] [facility]  PATIENT:  Jeanette Kuhn  MRN:  [id number]  :  1955  DATE OF SERVICE:  3/4/2022      Assessment/Plan:        Problem List Items Addressed This Visit        Cardiovascular and Mediastinum    Paroxysmal atrial fibrillation (HCC)     · Reportedly PAF following interleukin infusions  · Not currently on [facility]         TIA (transient ischemic attack)     · Presented with dysarthria, left facial droop, right upper extremity weakness lasting approximately 10 minutes with self resolution  · Imaging negative for acute infarct  · Etiology concerning for TIA  · Completed on DAPT x 21 days followed by aspirin monotherapy  He is compliant with his aspirin without significant side effects  · BP goal <130/80  · LDL goal <100  · HgbA1C <7 0  · Defer to PCP for medical management  · Avoid NSAIDS    Follow up PRN             Nervous and Auditory    Metastasis to brain ([facility] [address] )     · WBRT in 2230-6880            Other    Hyperlipidemia - Primary     · Cholesterol 129, Trig 64, HDL 32, LDL 84                  History of Present Illness:   Jeanette Kuhn is a 77 y o  male PMH of HTN, atrial fibrillation not on AC, hypothyroidism, anxiety, concern for CAA, renal cell CA diagnosed in  s/p nephrectomy, mets to brain s/p WBRT and lung, HLD who presents for follow up in regard to his recent hospitalization for stroke like symptoms  Bean Garzon presented to [facility] on 01/10/2022 with symptoms of transient ambulatory dysfunction, dysarthria, left facial droop, RUE weakness, lasting about 5-10 minutes  /58  NIHSS of 0  CTH/CTA no LVO, or critical stenosis  MRI without acute infarct or hemorrhage  Extensive multifocal blooming artifact throughout the supra and infratentorial brain redemonstrated, possible related to amyloid angioma (herediary) versus perhaps sequela of chronic hypertensive encephalopathy or prior traumatic brain injury  Cannot fully rule out TIA for transient symptoms   If imaging truly represents CAA that could be cause for transient neurologic symptoms in and of itself  It was felt that CAA is less likely the cause of abnormal MRI and felt to be more sequelae of WBRT  Adriana Bal was started on DAPT for 21 days, followed by aspirin 81mg monotherapy  He has no known hemorrhagic complications  Adriana Bal has been on aspirin 81mg for secondary stroke prevention  He is compliant and tolerating the medication without significant side effects  No reported new TIA/stroke like symptoms  Results:     Stroke risk factors were evaluated including:   Lab Results   Component Value Date/Time    CHOLESTEROL 129 01/11/2022 06:36 AM     Lab Results   Component Value Date/Time    TRIG 64 01/11/2022 06:36 AM     Lab Results   Component Value Date/Time    HDL 32 (L) 01/11/2022 06:36 AM     Lab Results   Component Value Date/Time    LDLCALC 84 01/11/2022 06:36 AM       Lab Results   Component Value Date/Time    HGBA1C 5 3 01/11/2022 06:36 AM     Lab Results   Component Value Date/Time     01/11/2022 06:36 AM       Imaging:     Results for orders placed during the hospital encounter of 01/10/22    MRI brain wo contrast    Narrative  MRI BRAIN WITHOUT CONTRAST    INDICATION: Stroke pathway  Ambulatory dysfunction and dysarthria    COMPARISON:   9/9/2019; 1/10/2022; 5/19/2016    TECHNIQUE:  Sagittal T1, axial T2, axial FLAIR, axial T1, axial [place] and axial diffusion imaging  IMAGE QUALITY:  Diagnostic  FINDINGS:    BRAIN PARENCHYMA:  There is no discrete mass, mass effect or midline shift  There is no intracranial hemorrhage  There is no evidence of acute infarction and diffusion imaging is unremarkable  Small scattered hyperintensities on T2/FLAIR imaging are  noted in the periventricular and subcortical white matter demonstrating an appearance that is statistically most likely to represent moderate microangiopathic change     Innumerable foci of susceptibility blooming artifact redemonstrated throughout the  supra and infratentorial brain, more pronounced than on the prior study possibly in part due to differences in imaging technique as the current study has susceptibility weighted imaging, more sensitive pulse sequence, compared to the previous study which  has axial T2 Star imaging /gradient echo imaging  However on the prior outside brain MRI which did have susceptibility weighted imaging from 5/19/2016 the blooming artifact is grossly stable     No definite acute intracranial hemorrhage  VENTRICLES:  Normal for the patient's age  SELLA AND PITUITARY GLAND:  Normal     ORBITS:  Bilateral lens implants noted    PARANASAL SINUSES:  Mild right sphenoid sinus mucosal thickening with a small air-fluid level  VASCULATURE:  Evaluation of the major intracranial vasculature demonstrates appropriate flow voids  CALVARIUM AND SKULL BASE:  Normal     EXTRACRANIAL SOFT TISSUES:  Normal     Impression  1  No acute infarction, intracranial hemorrhage or mass effect  2   Moderate, chronic microangiopathy redemonstrated  3   Extensive multifocal blooming artifact throughout the supra and infratentorial brain redemonstrated, possibly related to amyloid angiopathy  Differential diagnosis includes multiple cavernous angiomas (hereditary) versus perhaps sequela of chronic  hypertensive encephalopathy or prior traumatic brain injury  Correlation with clinical history recommended  No acute intracranial hemorrhage  Workstation performed: [id number]    Results for orders placed during the hospital encounter of 01/10/22    CTA stroke alert (head/neck)    Narrative  CTA NECK AND BRAIN WITH CONTRAST    INDICATION: Transient ischemic attack (TIA)  R facial droop, R arm numbness    COMPARISON:   None  TECHNIQUE:   Post contrast imaging was performed after administration of iodinated contrast through the neck and brain  Post contrast axial 0 625 mm images timed to opacify the arterial system      3D rendering was performed on an independent workstation  MIP reconstructions performed  Coronal reconstructions were performed of the noncontrast portion of the brain  Radiation dose length product (DLP) for this visit:  464 07 mGy-cm   This examination, like all CT scans performed in the [facility], was performed utilizing techniques to minimize radiation dose exposure, including the use of iterative  reconstruction and automated exposure control  IV Contrast:  85 mL of iohexol (OMNIPAQUE)    IMAGE QUALITY:   Diagnostic    FINDINGS:    CERVICAL VASCULATURE  AORTIC ARCH AND GREAT VESSELS: Aortic arch and great vessel origins are unremarkable  RIGHT VERTEBRAL ARTERY CERVICAL SEGMENT:The vessel origin is unremarkable  The vessel is patent throughout the neck without high-grade stenosis  LEFT VERTEBRAL ARTERY CERVICAL SEGMENT: The vessel origin is unremarkable  The vessel is patent throughout the neck without high-grade stenosis  RIGHT EXTRACRANIAL CAROTID SEGMENT:Mild atherosclerotic disease at the bifurcation  No stenosis or dissection  LEFT EXTRACRANIAL CAROTID SEGMENT: Mild atherosclerotic disease at the bifurcation  No stenosis or dissection  INTRACRANIAL VASCULATURE  INTERNAL CAROTID ARTERIES: Mild atherosclerotic disease of distal cavernous segments of bilateral internal carotid arteries  Normal ophthalmic artery origins  ANTERIOR CIRCULATION:  Normal A1 segments  Bilateral anterior cerebral arteries are unremarkable  Normal anterior comminuting artery  MIDDLE CEREBRAL ARTERY CIRCULATION:  Bilateral M1 segments and major M2 branches are patent without high-grade stenosis  DISTAL VERTEBRAL ARTERIES:  Distal vertebral arteries are patent without high-grade stenosis  Suggested bilateral common origin AICA/PICA    BASILAR ARTERY:  Basilar artery is unremarkable  Normal superior cerebellar arteries        POSTERIOR CEREBRAL ARTERIES:    Bilateral posterior cerebral arteries are patent without high-grade stenosis  Normal posterior communicating arteries  DURAL VENOUS SINUSES:  Unremarkable  NON VASCULAR ANATOMY    BONY STRUCTURES: No acute or aggressive appearing osseous abnormality  SOFT TISSUES OF THE NECK:  Unremarkable  THORACIC INLET:  Unremarkable  Impression  1  No intracranial large vessel occlusion or critical stenosis  No aneurysm  2   No hemodynamically significant stenosis of cervical carotid and vertebral arteries  No dissection  I personally discussed this study with Dr Jad Armas on 1/10/2022 at 9:49 AM         Workstation performed: [id number]    No results found for this or any previous visit  No results found for this or any previous visit  No results found for this or any previous visit  No results found for this or any previous visit        Past Medical History:     Past Medical History:   Diagnosis Date    Abnormal eye finding     last assessed 08/18/2015    Anemia     Atrial fibrillation (HCC)     patient denies    Generalized anxiety disorder     History of kidney cancer     Hyperlipidemia     no longer    Hypertension     no longer       Patient Active Problem List   Diagnosis    Benign essential hypertension    Bone metastasis (HCC)    Degenerative disk disease    Generalized anxiety disorder    Generalized osteoarthritis    Hyperlipidemia    Iron deficiency anemia secondary to inadequate dietary iron intake    Macular edema    Malignant neoplasm of kidney ([facility] [address] )    Metastasis to brain ([facility] [address] [facility]    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 ([facility] [address] [facility]    Malignant neoplasm metastatic to bone (HCC)    Paroxysmal atrial fibrillation ([facility] [address] )    Personal history of radiation therapy    Pulmonary nodules    Benign colonic polyp    Hypothyroidism    Cholelithiasis    TIA (transient ischemic attack)       Medications:      Current Outpatient Medications   Medication Sig Dispense Refill    Ascorbic Acid (vitamin C) 100 MG tablet Take 100 mg by mouth daily      aspirin 81 mg chewable tablet Chew 1 tablet (81 mg total) daily 30 tablet 0    Bevacizumab (AVASTIN IV) Infuse into a venous catheter      sertraline (ZOLOFT) 50 mg tablet TAKE 1 TABLET DAILY 90 tablet 3    atorvastatin (LIPITOR) 40 mg tablet Take 1 tablet (40 mg total) by mouth every evening (Patient not taking: Reported on 3/4/2022 ) 30 tablet 0    clopidogrel (Plavix) 75 mg tablet Take 1 tablet (75 mg total) by mouth daily (Patient not taking: Reported on 3/4/2022 ) 20 tablet 0     No current facility-administered medications for this visit  Allergies: Allergies   Allergen Reactions    Prednisone GI Intolerance       Family History:     Family History   Problem Relation Age of Onset    No Known Problems Mother     Cancer Paternal Uncle        Social History:     Social History     Socioeconomic History    Marital status: /Civil Union     Spouse name: Not on file    Number of children: Not on file    Years of education: Not on file    Highest education level: Not on file   Occupational History    Occupation: Retired     Employer: OTHER   Tobacco Use    Smoking status: Former Smoker     Types: Cigarettes     Quit date:      Years since quittin     Smokeless tobacco: Never Used    Tobacco comment: 2 cigarettes   Vaping Use    Vaping Use: Not on file   Substance and Sexual Activity    Alcohol use: No    Drug use: Never    Sexual activity: Not on file   Other Topics Concern    Not on file   Social History Narrative    Caffeine use- 1/2 caf 1/2 dec   Coffee- 1 1/2 cups     Social Determinants of Health     Financial Resource Strain: Not on file   Food Insecurity: Not on file   Transportation Needs: Not on file   Physical Activity: Not on file   Stress: Not on file   Social Connections: Not on file   Intimate Partner Violence: Not on file   Housing Stability: Not on file         ROS:     Review of Systems   Constitutional: Negative  Negative for appetite change and fever  HENT: Negative  Negative for hearing loss, tinnitus, trouble swallowing and voice change  Eyes: Positive for visual disturbance  Negative for photophobia and pain  Respiratory: Negative  Negative for shortness of breath  Cardiovascular: Negative  Negative for palpitations  Gastrointestinal: Negative  Negative for nausea and vomiting  Endocrine: Negative  Negative for cold intolerance  Genitourinary: Negative  Negative for dysuria, frequency and urgency  Musculoskeletal: Negative  Negative for myalgias and neck pain  Skin: Negative  Negative for rash  Allergic/Immunologic: Negative  Neurological: Negative  Negative for dizziness, tremors, seizures, syncope, facial asymmetry, speech difficulty, weakness, light-headedness, numbness and headaches  Hematological: Negative  Does not bruise/bleed easily  Psychiatric/Behavioral: Positive for sleep disturbance (cant get to sleep at night)  Negative for confusion and hallucinations  All other systems reviewed and are negative  ROS reviewed personally and edited as needed  Objective:   Physical Exam:    /78 (BP Location: Right arm, Patient Position: Sitting, Cuff Size: Standard)   Pulse 60   Temp 98 2 °F (36 8 °C)   Wt 79 5 kg (175 lb 4 8 oz)   BMI 26 65 kg/m²     Physical Exam  Eyes:      Extraocular Movements: EOM normal       Pupils: Pupils are equal, round, and reactive to light  Neurological:      Mental Status: He is oriented to person, place, and time  Coordination: Finger-Nose-Finger Test normal       Gait: Gait is intact  Deep Tendon Reflexes: Strength normal       Reflex Scores:       Bicep reflexes are 1+ on the right side and 1+ on the left side  Brachioradialis reflexes are 1+ on the right side and 1+ on the left side  Patellar reflexes are 1+ on the right side and 1+ on the left side         Achilles reflexes are 1+ on the right side and 1+ on the left side  Psychiatric:         Speech: Speech normal        Neurologic Exam     Mental Status   Oriented to person, place, and time  Follows 2 step commands  Attention: normal  Concentration: normal    Speech: speech is normal   Level of consciousness: alert  Knowledge: good  Normal comprehension  Cranial Nerves     CN III, IV, VI   Pupils are equal, round, and reactive to light  Extraocular motions are normal    Right pupil: Size: 3 mm  Shape: regular  Reactivity: brisk  Left pupil: Size: 3 mm  Shape: regular  Reactivity: brisk  CN III: no CN III palsy  CN VI: no CN VI palsy  Nystagmus: none   Diplopia: none  Ophthalmoparesis: none  Upgaze: normal  Downgaze: normal  Conjugate gaze: present    CN V   Facial sensation intact  CN VII   Facial expression full, symmetric  CN VIII   Hearing: intact    CN IX, X   Palate: symmetric    CN XI   Right trapezius strength: normal  Left trapezius strength: normal    CN XII   Tongue: not atrophic  Fasciculations: absent  Tongue deviation: none    Motor Exam   Right arm pronator drift: absent  Left arm pronator drift: absent    Strength   Strength 5/5 throughout  Sensory Exam   Light touch normal      Gait, Coordination, and Reflexes     Gait  Gait: normal    Coordination   Finger to nose coordination: normal    Reflexes   Right brachioradialis: 1+  Left brachioradialis: 1+  Right biceps: 1+  Left biceps: 1+  Right patellar: 1+  Left patellar: 1+  Right achilles: 1+  Left achilles: 1+        I have spent 45 minutes with Patient  today in which greater than 50% of this time was spent in counseling/coordination of care regarding Diagnostic results, Prognosis, Risks and benefits of tx options, Intructions for management, Patient and family education, Importance of tx compliance, Risk factor reductions, Impressions and Plan of care as above

## 2022-03-04 NOTE — PROGRESS NOTES
Review of Systems   Constitutional: Negative  Negative for appetite change and fever  HENT: Negative  Negative for hearing loss, tinnitus, trouble swallowing and voice change  Eyes: Positive for visual disturbance  Negative for photophobia and pain  Respiratory: Negative  Negative for shortness of breath  Cardiovascular: Negative  Negative for palpitations  Gastrointestinal: Negative  Negative for nausea and vomiting  Endocrine: Negative  Negative for cold intolerance  Genitourinary: Negative  Negative for dysuria, frequency and urgency  Musculoskeletal: Negative  Negative for myalgias and neck pain  Skin: Negative  Negative for rash  Allergic/Immunologic: Negative  Neurological: Negative  Negative for dizziness, tremors, seizures, syncope, facial asymmetry, speech difficulty, weakness, light-headedness, numbness and headaches  Hematological: Negative  Does not bruise/bleed easily  Psychiatric/Behavioral: Positive for sleep disturbance (cant get to sleep at night)  Negative for confusion and hallucinations  All other systems reviewed and are negative

## 2022-03-04 NOTE — PATIENT INSTRUCTIONS
 Risk factors for stroke include high blood pressure, high cholesterol, diabetes, sleep apnea, gender (men more so than females), age, atrial fibrillation (abnormal heart rhythm), underlying clotting or bleeding disorder   Continue with aspirin 81mg daily for secondary stroke prevention   BP goal < 130/80  Recommend monitoring blood pressure once in a while   LDL goal < 100   Defer to PCP regarding glycemic, cholesterol and blood pressure management    Counseling    I advised patient to avoid using NSAIDs for headaches or other pain and to stick to tylenol if needed   Recommend mediterranean diet & regular exercise regimen atleast 4-5 times a week for 20-30 minutes  Sai Jimenez Educated patient/family regarding medication compliance    Follow up on an as needed basis  I would be happy to see the patient sooner if any new questions/concerns arise  Patient/Guardian was advised to the call the office if they have any questions and concerns in the meantime  Patient/Guardian does understand that if they have any new stroke like symptoms such as facial droop on one side, weakness/paralysis on either side, speech trouble, numbness on one side, balance issues, any vision changes, extreme dizziness or any new headache, to call 9-1-1 immediately or to proceed to the nearest ER immediately

## 2022-03-04 NOTE — ASSESSMENT & PLAN NOTE
· Reportedly PAF following interleukin infusions     · Not currently on [facility]

## 2022-03-31 ENCOUNTER — OFFICE VISIT (OUTPATIENT)
Dept: URGENT CARE | Facility: CLINIC | Age: 67
End: 2022-03-31
Payer: MEDICARE

## 2022-03-31 VITALS
BODY MASS INDEX: 26.61 KG/M2 | OXYGEN SATURATION: 97 % | DIASTOLIC BLOOD PRESSURE: 67 MMHG | WEIGHT: 175 LBS | HEART RATE: 60 BPM | SYSTOLIC BLOOD PRESSURE: 132 MMHG | RESPIRATION RATE: 18 BRPM | TEMPERATURE: 98.5 F

## 2022-03-31 DIAGNOSIS — L02.415 ABSCESS OF HIP, RIGHT: Primary | ICD-10-CM

## 2022-03-31 PROCEDURE — G0463 HOSPITAL OUTPT CLINIC VISIT: HCPCS | Performed by: PHYSICIAN ASSISTANT

## 2022-03-31 PROCEDURE — 99213 OFFICE O/P EST LOW 20 MIN: CPT | Performed by: PHYSICIAN ASSISTANT

## 2022-03-31 RX ORDER — SULFAMETHOXAZOLE AND TRIMETHOPRIM 800; 160 MG/1; MG/1
1 TABLET ORAL EVERY 12 HOURS SCHEDULED
Qty: 20 TABLET | Refills: 0 | Status: SHIPPED | OUTPATIENT
Start: 2022-03-31 | End: 2022-04-10

## 2022-03-31 RX ORDER — MONTELUKAST SODIUM 10 MG/1
TABLET ORAL
COMMUNITY
Start: 2022-02-08

## 2022-03-31 NOTE — PROGRESS NOTES
[facility]        NAME: Fe Dillon is a 77 y o  male  : 1955    MRN: [id number]  DATE: 2022  TIME: 11:33 AM    Assessment and Plan   Abscess of hip, right [L02 [id number]]  1  Abscess of hip, right  sulfamethoxazole-trimethoprim (BACTRIM DS) 800-160 mg per tablet         Patient Instructions       Follow up with PCP in 3-5 days  Proceed to  ER if symptoms worsen  Chief Complaint     Chief Complaint   Patient presents with    Wound Infection     left hip x 5 days         History of Present Illness       Patient is a 76 y/o/m presenting to [facility] with infected cyst of right hip  Patient reports developing this cyst about 45-50 years ago  Patient recently began irritating the area  Redness and pain surrounding site  No drainage or fevers  Review of Systems   Review of Systems   Constitutional: Negative for chills and fever  HENT: Negative for ear pain and sore throat  Eyes: Negative for pain and visual disturbance  Respiratory: Negative for cough and shortness of breath  Cardiovascular: Negative for chest pain and palpitations  Gastrointestinal: Negative for abdominal pain and vomiting  Genitourinary: Negative for dysuria and hematuria  Musculoskeletal: Negative for arthralgias and back pain  Skin: Positive for wound  Negative for color change and rash  Neurological: Negative for seizures and syncope  All other systems reviewed and are negative          Current Medications       Current Outpatient Medications:     Ascorbic Acid (vitamin C) 100 MG tablet, Take 100 mg by mouth daily, Disp: , Rfl:     aspirin 81 mg chewable tablet, Chew 1 tablet (81 mg total) daily, Disp: 30 tablet, Rfl: 0    sertraline (ZOLOFT) 50 mg tablet, TAKE 1 TABLET DAILY, Disp: 90 tablet, Rfl: 3    atorvastatin (LIPITOR) 40 mg tablet, Take 1 tablet (40 mg total) by mouth every evening (Patient not taking: Reported on 3/4/2022 ), Disp: 30 tablet, Rfl: 0    Bevacizumab (AVASTIN IV), Infuse into a venous catheter (Patient not taking: Reported on 3/31/2022 ), Disp: , Rfl:     clopidogrel (Plavix) 75 mg tablet, Take 1 tablet (75 mg total) by mouth daily (Patient not taking: Reported on 3/4/2022 ), Disp: 20 tablet, Rfl: 0    montelukast (SINGULAIR) 10 mg tablet, , Disp: , Rfl:     sulfamethoxazole-trimethoprim (BACTRIM DS) 800-160 mg per tablet, Take 1 tablet by mouth every 12 (twelve) hours for 10 days, Disp: 20 tablet, Rfl: 0    Current Allergies     Allergies as of 03/31/2022 - Reviewed 03/31/2022   Allergen Reaction Noted    Prednisone GI Intolerance 06/24/2020            The following portions of the patient's history were reviewed and updated as appropriate: allergies, current medications, past family history, past medical history, past social history, past surgical history and problem list      Past Medical History:   Diagnosis Date    Abnormal eye finding     last assessed 08/18/2015    Anemia     Atrial fibrillation ([address] )     patient denies    Generalized anxiety disorder     History of kidney cancer     Hyperlipidemia     no longer    Hypertension     no longer       Past Surgical History:   Procedure Laterality Date    COLONOSCOPY N/A 3/10/2017    Procedure: COLONOSCOPY;  Surgeon: Valente Anglin MD;  Location: [facility];  Service:    Nara Gonzalez EXTERNAL EAR SURGERY      KIDNEY SURGERY      TIBIA FRACTURE SURGERY      TONSILLECTOMY  childhood    TOOTH EXTRACTION  08/11/2021       Family History   Problem Relation Age of Onset    No Known Problems Mother     Cancer Paternal Uncle          Medications have been verified  Objective   /67   Pulse 60   Temp 98 5 °F (36 9 °C)   Resp 18   Wt 79 4 kg (175 lb)   SpO2 97%   BMI 26 61 kg/m²   No LMP for male patient  Physical Exam     Physical Exam  Constitutional:       Appearance: Normal appearance  He is normal weight  HENT:      Head: Normocephalic and atraumatic        Nose: Nose normal       Mouth/Throat: Mouth: Mucous membranes are moist    Eyes:      Extraocular Movements: Extraocular movements intact  Conjunctiva/sclera: Conjunctivae normal       Pupils: Pupils are equal, round, and reactive to light  Cardiovascular:      Rate and Rhythm: Normal rate  Pulmonary:      Effort: Pulmonary effort is normal    Musculoskeletal:         General: Normal range of motion  Cervical back: Normal range of motion and neck supple  Skin:     General: Skin is warm and dry  Neurological:      General: No focal deficit present  Mental Status: He is alert and oriented to person, place, and time     Psychiatric:         Mood and Affect: Mood normal          Behavior: Behavior normal

## 2022-04-04 ENCOUNTER — OFFICE VISIT (OUTPATIENT)
Dept: SURGERY | Facility: CLINIC | Age: 67
End: 2022-04-04
Payer: MEDICARE

## 2022-04-04 ENCOUNTER — OFFICE VISIT (OUTPATIENT)
Dept: INTERNAL MEDICINE CLINIC | Facility: CLINIC | Age: 67
End: 2022-04-04
Payer: MEDICARE

## 2022-04-04 VITALS
HEIGHT: 68 IN | BODY MASS INDEX: 26.55 KG/M2 | TEMPERATURE: 96.9 F | WEIGHT: 175.2 LBS | OXYGEN SATURATION: 98 % | SYSTOLIC BLOOD PRESSURE: 110 MMHG | DIASTOLIC BLOOD PRESSURE: 66 MMHG | HEART RATE: 52 BPM

## 2022-04-04 VITALS
OXYGEN SATURATION: 94 % | RESPIRATION RATE: 18 BRPM | SYSTOLIC BLOOD PRESSURE: 100 MMHG | TEMPERATURE: 98.4 F | HEIGHT: 68 IN | BODY MASS INDEX: 26.67 KG/M2 | HEART RATE: 77 BPM | DIASTOLIC BLOOD PRESSURE: 60 MMHG | WEIGHT: 176 LBS

## 2022-04-04 DIAGNOSIS — L02.91 ABSCESS: Primary | ICD-10-CM

## 2022-04-04 DIAGNOSIS — L02.415 ABSCESS OF SKIN OF RIGHT HIP: Primary | ICD-10-CM

## 2022-04-04 DIAGNOSIS — L02.91 ABSCESS: ICD-10-CM

## 2022-04-04 PROCEDURE — 87077 CULTURE AEROBIC IDENTIFY: CPT | Performed by: SURGERY

## 2022-04-04 PROCEDURE — 87070 CULTURE OTHR SPECIMN AEROBIC: CPT | Performed by: SURGERY

## 2022-04-04 PROCEDURE — 99213 OFFICE O/P EST LOW 20 MIN: CPT | Performed by: INTERNAL MEDICINE

## 2022-04-04 PROCEDURE — 88304 TISSUE EXAM BY PATHOLOGIST: CPT | Performed by: PATHOLOGY

## 2022-04-04 PROCEDURE — 10060 I&D ABSCESS SIMPLE/SINGLE: CPT | Performed by: SURGERY

## 2022-04-04 PROCEDURE — 99203 OFFICE O/P NEW LOW 30 MIN: CPT | Performed by: SURGERY

## 2022-04-04 PROCEDURE — 87186 SC STD MICRODIL/AGAR DIL: CPT | Performed by: SURGERY

## 2022-04-04 PROCEDURE — 87205 SMEAR GRAM STAIN: CPT | Performed by: SURGERY

## 2022-04-04 NOTE — ASSESSMENT & PLAN NOTE
The patient is a pleasant 70-year-old male presenting with a longstanding history of a right hip epidermoid cyst which has recently become infected  He was prescribed antibiotic therapy week ago  Symptoms have persisted and the cyst is organized into an abscess for which drainage is now indicated  On physical examination he is a pleasant well-nourished well-developed male  He is in no acute distress  Awake alert oriented  Competent reliable as a historian  Inspection of the skin of the right hip confirms the presence of a right hip abscess secondary to an infected epidermoid cyst     I have advised incision and drainage of the epidermoid cyst   The technical details of the procedures well as the options benefits risks and alternatives were reviewed  Informed consent obtained to proceed

## 2022-04-04 NOTE — PROGRESS NOTES
Depression Screening and Follow-up Plan: Patient was screened for depression during today's encounter  They screened negative with a PHQ-2 score of 0  Assessment/Plan:  Problem List Items Addressed This Visit     None      Visit Diagnoses     Abscess    -  Primary    Relevant Orders    Ambulatory referral to General Surgery           Diagnoses and all orders for this visit:    Abscess  -     Ambulatory referral to General Surgery; Future        No problem-specific Assessment & Plan notes found for this encounter  A/P: Definite abscess w/o improvement on abx  Needed to be I & D  Concern now is that it may me complex given 15 year duration  Spoke with general sx and agreed to see him now  Continue abx for now and suspect c/s will be obtained  Might consider adding clindamycin, but will let that up to the sx  RTC as scheduled  Td 2015  Subjective:      Patient ID: Melecio José is a 77 y o  male  WM presents for UC f/u for right hip infected cyst  Pt started on bactrim  Reports cyst there for 15 years, and over the past week, became infected after pt manipulated it  Went to the UC and given bactrim  Not much better  No sx intervention at that time  No fever or chills  Tolerating the meds  The following portions of the patient's history were reviewed and updated as appropriate:   He has a past medical history of Abnormal eye finding, Anemia, Atrial fibrillation ([address] ), Generalized anxiety disorder, History of kidney cancer, Hyperlipidemia, and Hypertension  ,  does not have any pertinent problems on file  ,   has a past surgical history that includes External ear surgery; Kidney surgery; Tibia fracture surgery; Colonoscopy (N/A, 3/10/2017); Tonsillectomy (childhood); and Tooth extraction (08/11/2021)  ,  family history includes Cancer in his paternal uncle; No Known Problems in his mother  ,   reports that he quit smoking about 23 years ago  His smoking use included cigarettes   He has never used smokeless tobacco  He reports that he does not drink alcohol and does not use drugs  ,  is allergic to prednisone     Current Outpatient Medications   Medication Sig Dispense Refill    Ascorbic Acid (vitamin C) 100 MG tablet Take 100 mg by mouth daily      aspirin 81 mg chewable tablet Chew 1 tablet (81 mg total) daily 30 tablet 0    Bevacizumab (AVASTIN IV) Infuse into a venous catheter Every 4 weeks       sertraline (ZOLOFT) 50 mg tablet TAKE 1 TABLET DAILY 90 tablet 3    sulfamethoxazole-trimethoprim (BACTRIM DS) 800-160 mg per tablet Take 1 tablet by mouth every 12 (twelve) hours for 10 days 20 tablet 0    atorvastatin (LIPITOR) 40 mg tablet Take 1 tablet (40 mg total) by mouth every evening (Patient not taking: Reported on 3/4/2022 ) 30 tablet 0    clopidogrel (Plavix) 75 mg tablet Take 1 tablet (75 mg total) by mouth daily (Patient not taking: Reported on 3/4/2022 ) 20 tablet 0    montelukast (SINGULAIR) 10 mg tablet  (Patient not taking: Reported on 3/31/2022 )       No current facility-administered medications for this visit  Review of Systems   Constitutional: Positive for activity change  Negative for chills, diaphoresis, fatigue and fever  Respiratory: Negative for cough, chest tightness, shortness of breath and wheezing  Cardiovascular: Negative for chest pain, palpitations and leg swelling  Gastrointestinal: Negative for abdominal pain, constipation, diarrhea, nausea and vomiting  Genitourinary: Negative for difficulty urinating, dysuria and frequency  Musculoskeletal: Negative for arthralgias, gait problem and myalgias  Skin: Positive for wound  Neurological: Negative for light-headedness and headaches  Psychiatric/Behavioral: Negative for confusion  The patient is not nervous/anxious          PHQ-2/9 Depression Screening    Little interest or pleasure in doing things: 0 - not at all  Feeling down, depressed, or hopeless: 0 - not at all  PHQ-2 Score: 0  PHQ-2 Interpretation: Negative depression screen        Objective:  Vitals:    04/04/22 0806   BP: 110/66   Pulse: (!) 52   Temp: (!) 96 9 °F (36 1 °C)   TempSrc: Tympanic   SpO2: 98%   Weight: 79 5 kg (175 lb 3 2 oz)   Height: 5' 8" (1 727 m)     Body mass index is 26 64 kg/m²  Physical Exam  Vitals and nursing note reviewed  Constitutional:       General: He is not in acute distress  Appearance: Normal appearance  He is not ill-appearing  HENT:      Head: Normocephalic and atraumatic  Mouth/Throat:      Mouth: Mucous membranes are moist    Eyes:      Extraocular Movements: Extraocular movements intact  Conjunctiva/sclera: Conjunctivae normal       Pupils: Pupils are equal, round, and reactive to light  Skin:     Findings: Lesion (right him with a large area of increase temp, erythema and induration    Central flucttuant area noted  ) present  Neurological:      General: No focal deficit present  Mental Status: He is alert and oriented to person, place, and time  Mental status is at baseline  Psychiatric:         Mood and Affect: Mood normal          Behavior: Behavior normal          Thought Content:  Thought content normal          Judgment: Judgment normal

## 2022-04-04 NOTE — PROGRESS NOTES
Assessment/Plan:    Abscess of skin of right hip  The patient is a pleasant 60-year-old male presenting with a longstanding history of a right hip epidermoid cyst which has recently become infected  He was prescribed antibiotic therapy week ago  Symptoms have persisted and the cyst is organized into an abscess for which drainage is now indicated  On physical examination he is a pleasant well-nourished well-developed male  He is in no acute distress  Awake alert oriented  Competent reliable as a historian  Inspection of the skin of the right hip confirms the presence of a right hip abscess secondary to an infected epidermoid cyst     I have advised incision and drainage of the epidermoid cyst   The technical details of the procedures well as the options benefits risks and alternatives were reviewed  Informed consent obtained to proceed  Subjective:      Patient ID: Meeta Allen is a 77 y o  male  Patient came in today for a cyst on his right hip  Patient states he had the cyst for 50 years but it has been infected for a week due to him picking at it  The cyst develop a black head and its big and red  No fevers or chills  Farzana ORTIZ MA       The following portions of the patient's history were reviewed and updated as appropriate: allergies, current medications, past family history, past medical history, past social history, past surgical history and problem list     Review of Systems   Constitutional: Negative for chills and fever  HENT: Negative for ear pain and sore throat  Eyes: Negative for pain and visual disturbance  Respiratory: Negative for cough and shortness of breath  Cardiovascular: Negative for chest pain and palpitations  Gastrointestinal: Negative for abdominal pain and vomiting  Genitourinary: Negative for dysuria and hematuria  Musculoskeletal: Negative for arthralgias and back pain  Skin: Negative for color change and rash     Neurological: Negative for seizures and syncope  All other systems reviewed and are negative  Objective:      /60 (BP Location: Left arm, Patient Position: Sitting, Cuff Size: Large)   Pulse 77   Temp 98 4 °F (36 9 °C) (Temporal)   Resp 18   Ht 5' 8" (1 727 m)   Wt 79 8 kg (176 lb)   SpO2 94%   BMI 26 76 kg/m²            Physical Exam  Vitals and nursing note reviewed  Constitutional:       Appearance: He is well-developed  HENT:      Head: Normocephalic and atraumatic  Eyes:      Conjunctiva/sclera: Conjunctivae normal       Pupils: Pupils are equal, round, and reactive to light  Cardiovascular:      Rate and Rhythm: Normal rate and regular rhythm  Pulmonary:      Effort: Pulmonary effort is normal       Breath sounds: Normal breath sounds  Abdominal:      General: Bowel sounds are normal       Palpations: Abdomen is soft  Musculoskeletal:         General: Normal range of motion  Cervical back: Normal range of motion and neck supple  Skin:     General: Skin is warm and dry  Comments: Right hip skin abscess present   Neurological:      Mental Status: He is alert and oriented to person, place, and time  Psychiatric:         Behavior: Behavior normal          Thought Content: Thought content normal          Judgment: Judgment normal        Incision and Drainage    Date/Time: 4/4/2022 9:45 AM  Performed by: Evelyn Huertas MD  Authorized by: Evelyn Huertas MD   Universal Protocol:  Consent: Verbal consent obtained  Risks and benefits: risks, benefits and alternatives were discussed  Consent given by: patient  Time out: Immediately prior to procedure a "time out" was called to verify the correct patient, procedure, equipment, support staff and site/side marked as required    Timeout called at: 4/4/2022 9:45 AM   Patient understanding: patient states understanding of the procedure being performed  Patient consent: the patient's understanding of the procedure matches consent given  Site marked: the operative site was marked  Patient identity confirmed: verbally with patient      Patient location:  Clinic  Location:     Type:  Abscess    Location:  Lower extremity    Lower extremity location:  R hip  Pre-procedure details:     Skin preparation:  Betadine  Anesthesia (see MAR for exact dosages): Anesthesia method:  Local infiltration    Local anesthetic:  Lidocaine 1% WITH epi  Procedure details:     Complexity:  Simple    Incision types:  Single straight    Scalpel blade:  15    Approach:  Open    Incision depth:  Subcutaneous    Wound management:  Probed and deloculated, extensive cleaning and debrided    Drainage:  Purulent    Drainage amount: Moderate    Wound treatment:  Wound left open    Packing material: 4 x 4 gauze  Post-procedure details:     Patient tolerance of procedure:   Tolerated well, no immediate complications

## 2022-04-04 NOTE — LETTER
April 4, 2022     Silvano Suarez DO  [address]    Patient: Mckenzie Umana   YOB: 1955   Date of Visit: 4/4/2022       Dear Dr Yasmin Renteria: Thank you for referring Anitra Conn to me for evaluation  Below are my notes for this consultation  If you have questions, please do not hesitate to call me  I look forward to following your patient along with you  Sincerely,        Clark Clemens MD        CC: No Recipients  Clark Clemens MD  4/4/2022  9:46 AM  Sign when Signing Visit  Assessment/Plan:    Abscess of skin of right hip  The patient is a pleasant 80-year-old male presenting with a longstanding history of a right hip epidermoid cyst which has recently become infected  He was prescribed antibiotic therapy week ago  Symptoms have persisted and the cyst is organized into an abscess for which drainage is now indicated  On physical examination he is a pleasant well-nourished well-developed male  He is in no acute distress  Awake alert oriented  Competent reliable as a historian  Inspection of the skin of the right hip confirms the presence of a right hip abscess secondary to an infected epidermoid cyst     I have advised incision and drainage of the epidermoid cyst   The technical details of the procedures well as the options benefits risks and alternatives were reviewed  Informed consent obtained to proceed  Subjective:      Patient ID: Mckenzie Umana is a 77 y o  male  Patient came in today for a cyst on his right hip  Patient states he had the cyst for 50 years but it has been infected for a week due to him picking at it  The cyst develop a black head and its big and red  No fevers or chills  Farzana ORTIZ MA       The following portions of the patient's history were reviewed and updated as appropriate: allergies, current medications, past family history, past medical history, past social history, past surgical history and problem list     Review of Systems   Constitutional: Negative for chills and fever  HENT: Negative for ear pain and sore throat  Eyes: Negative for pain and visual disturbance  Respiratory: Negative for cough and shortness of breath  Cardiovascular: Negative for chest pain and palpitations  Gastrointestinal: Negative for abdominal pain and vomiting  Genitourinary: Negative for dysuria and hematuria  Musculoskeletal: Negative for arthralgias and back pain  Skin: Negative for color change and rash  Neurological: Negative for seizures and syncope  All other systems reviewed and are negative  Objective:      /60 (BP Location: Left arm, Patient Position: Sitting, Cuff Size: Large)   Pulse 77   Temp 98 4 °F (36 9 °C) (Temporal)   Resp 18   Ht 5' 8" (1 727 m)   Wt 79 8 kg (176 lb)   SpO2 94%   BMI 26 76 kg/m²            Physical Exam  Vitals and nursing note reviewed  Constitutional:       Appearance: He is well-developed  HENT:      Head: Normocephalic and atraumatic  Eyes:      Conjunctiva/sclera: Conjunctivae normal       Pupils: Pupils are equal, round, and reactive to light  Cardiovascular:      Rate and Rhythm: Normal rate and regular rhythm  Pulmonary:      Effort: Pulmonary effort is normal       Breath sounds: Normal breath sounds  Abdominal:      General: Bowel sounds are normal       Palpations: Abdomen is soft  Musculoskeletal:         General: Normal range of motion  Cervical back: Normal range of motion and neck supple  Skin:     General: Skin is warm and dry  Comments: Right hip skin abscess present   Neurological:      Mental Status: He is alert and oriented to person, place, and time  Psychiatric:         Behavior: Behavior normal          Thought Content:  Thought content normal          Judgment: Judgment normal        Incision and Drainage    Date/Time: 4/4/2022 9:45 AM  Performed by: Olga Barakat MD  Authorized by: Tasneem Oswald Fredrick Stacy MD   Universal Protocol:  Consent: Verbal consent obtained  Risks and benefits: risks, benefits and alternatives were discussed  Consent given by: patient  Time out: Immediately prior to procedure a "time out" was called to verify the correct patient, procedure, equipment, support staff and site/side marked as required  Timeout called at: 4/4/2022 9:45 AM   Patient understanding: patient states understanding of the procedure being performed  Patient consent: the patient's understanding of the procedure matches consent given  Site marked: the operative site was marked  Patient identity confirmed: verbally with patient      Patient location:  Clinic  Location:     Type:  Abscess    Location:  Lower extremity    Lower extremity location:  R hip  Pre-procedure details:     Skin preparation:  Betadine  Anesthesia (see MAR for exact dosages): Anesthesia method:  Local infiltration    Local anesthetic:  Lidocaine 1% WITH epi  Procedure details:     Complexity:  Simple    Incision types:  Single straight    Scalpel blade:  15    Approach:  Open    Incision depth:  Subcutaneous    Wound management:  Probed and deloculated, extensive cleaning and debrided    Drainage:  Purulent    Drainage amount: Moderate    Wound treatment:  Wound left open    Packing material: 4 x 4 gauze  Post-procedure details:     Patient tolerance of procedure:   Tolerated well, no immediate complications

## 2022-04-07 LAB
BACTERIA WND AEROBE CULT: ABNORMAL
BACTERIA WND AEROBE CULT: ABNORMAL
GRAM STN SPEC: ABNORMAL
GRAM STN SPEC: ABNORMAL

## 2022-04-11 ENCOUNTER — OFFICE VISIT (OUTPATIENT)
Dept: SURGERY | Facility: CLINIC | Age: 67
End: 2022-04-11

## 2022-04-11 VITALS — TEMPERATURE: 98.1 F

## 2022-04-11 DIAGNOSIS — L02.415 ABSCESS OF SKIN OF RIGHT HIP: Primary | ICD-10-CM

## 2022-04-11 PROCEDURE — 99024 POSTOP FOLLOW-UP VISIT: CPT | Performed by: SURGERY

## 2022-04-11 NOTE — LETTER
April 11, 2022     Grzegorz Singh DO  [address]    Patient: Carl Sahni   YOB: 1955   Date of Visit: 4/11/2022       Dear Dr Virginia Schlatter: Thank you for referring Juan Diego Ybarra to me for evaluation  Below are my notes for this consultation  If you have questions, please do not hesitate to call me  I look forward to following your patient along with you  Sincerely,        Marni Hardy MD        CC: No Recipients  Marni Hardy MD  4/11/2022 10:10 AM  Incomplete  Assessment/Plan:    Abscess of skin of right hip  Patient returns the office status post I&D of a right hip abscess  Today in the office the patient voiced no new complaints referable to the wound  The patient's wound culture reviewed with the patient  Confirmed the presence of a Morganella morganii infection susceptible to the prescribed Bactrim  Today on physical examination the patient is well appearing  The surgical wound clean dry and open  Granulating and healing nicely by secondary intention  Patient educated regarding wound care  I look for to seeing him back in 2 weeks time  Subjective:      Patient ID: Carl Sahni is a 77 y o  male  Patient came in today for a s/p I&D 04/04/2022 R hip  He explain theres is a little bit of drainage and that it can't be packed anymore because its healing from the inside, so his wife is just applying gauze  He also mention its a little tender but no severe pain  No fevers or chills  Corrie ORTIZ MA       The following portions of the patient's history were reviewed and updated as appropriate: allergies, current medications, past family history, past medical history, past social history, past surgical history and problem list     Review of Systems   Constitutional: Negative for chills and fever  HENT: Negative for ear pain and sore throat  Eyes: Negative for pain and visual disturbance     Respiratory: Negative for cough and shortness of breath  Cardiovascular: Negative for chest pain and palpitations  Gastrointestinal: Negative for abdominal pain and vomiting  Genitourinary: Negative for dysuria and hematuria  Musculoskeletal: Negative for arthralgias and back pain  Skin: Negative for color change and rash  Neurological: Negative for seizures and syncope  All other systems reviewed and are negative  Objective:      Temp 98 1 °F (36 7 °C) (Temporal)          Physical Exam  Vitals and nursing note reviewed  Constitutional:       Appearance: He is well-developed  HENT:      Head: Normocephalic and atraumatic  Eyes:      Conjunctiva/sclera: Conjunctivae normal       Pupils: Pupils are equal, round, and reactive to light  Cardiovascular:      Rate and Rhythm: Normal rate and regular rhythm  Pulmonary:      Effort: Pulmonary effort is normal       Breath sounds: Normal breath sounds  Abdominal:      General: Bowel sounds are normal       Palpations: Abdomen is soft  Musculoskeletal:         General: Normal range of motion  Cervical back: Normal range of motion and neck supple  Skin:     General: Skin is warm and dry  Comments: Skin exam as described above   Neurological:      Mental Status: He is alert and oriented to person, place, and time  Psychiatric:         Behavior: Behavior normal          Thought Content:  Thought content normal          Judgment: Judgment normal

## 2022-04-11 NOTE — PROGRESS NOTES
Assessment/Plan:    Abscess of skin of right hip  Patient returns the office status post I&D of a right hip abscess  Today in the office the patient voiced no new complaints referable to the wound  The patient's wound culture reviewed with the patient  Confirmed the presence of a Morganella morganii infection susceptible to the prescribed Bactrim  Today on physical examination the patient is well appearing  The surgical wound clean dry and open  Granulating and healing nicely by secondary intention  Patient educated regarding wound care  I look for to seeing him back in 2 weeks time  Subjective:      Patient ID: Tahira Anderson is a 77 y o  male  Patient came in today for a s/p I&D 04/04/2022 R hip  He explain theres is a little bit of drainage and that it can't be packed anymore because its healing from the inside, so his wife is just applying gauze  He also mention its a little tender but no severe pain  No fevers or chills  Corrie ORTIZ MA       The following portions of the patient's history were reviewed and updated as appropriate: allergies, current medications, past family history, past medical history, past social history, past surgical history and problem list     Review of Systems   Constitutional: Negative for chills and fever  HENT: Negative for ear pain and sore throat  Eyes: Negative for pain and visual disturbance  Respiratory: Negative for cough and shortness of breath  Cardiovascular: Negative for chest pain and palpitations  Gastrointestinal: Negative for abdominal pain and vomiting  Genitourinary: Negative for dysuria and hematuria  Musculoskeletal: Negative for arthralgias and back pain  Skin: Negative for color change and rash  Neurological: Negative for seizures and syncope  All other systems reviewed and are negative  Objective:      Temp 98 1 °F (36 7 °C) (Temporal)          Physical Exam  Vitals and nursing note reviewed  Constitutional:       Appearance: He is well-developed  HENT:      Head: Normocephalic and atraumatic  Eyes:      Conjunctiva/sclera: Conjunctivae normal       Pupils: Pupils are equal, round, and reactive to light  Cardiovascular:      Rate and Rhythm: Normal rate and regular rhythm  Pulmonary:      Effort: Pulmonary effort is normal       Breath sounds: Normal breath sounds  Abdominal:      General: Bowel sounds are normal       Palpations: Abdomen is soft  Musculoskeletal:         General: Normal range of motion  Cervical back: Normal range of motion and neck supple  Skin:     General: Skin is warm and dry  Comments: Skin exam as described above   Neurological:      Mental Status: He is alert and oriented to person, place, and time  Psychiatric:         Behavior: Behavior normal          Thought Content:  Thought content normal          Judgment: Judgment normal

## 2022-04-11 NOTE — ASSESSMENT & PLAN NOTE
Patient returns the office status post I&D of a right hip abscess  Today in the office the patient voiced no new complaints referable to the wound  The patient's wound culture reviewed with the patient  Confirmed the presence of a Morganella morganii infection susceptible to the prescribed Bactrim  Today on physical examination the patient is well appearing  The surgical wound clean dry and open  Granulating and healing nicely by secondary intention  Patient educated regarding wound care  I look for to seeing him back in 2 weeks time

## 2022-04-18 ENCOUNTER — TELEPHONE (OUTPATIENT)
Dept: SURGERY | Facility: CLINIC | Age: 67
End: 2022-04-18

## 2022-04-21 ENCOUNTER — OFFICE VISIT (OUTPATIENT)
Dept: INTERNAL MEDICINE CLINIC | Facility: CLINIC | Age: 67
End: 2022-04-21
Payer: MEDICARE

## 2022-04-21 VITALS
SYSTOLIC BLOOD PRESSURE: 106 MMHG | WEIGHT: 176.2 LBS | DIASTOLIC BLOOD PRESSURE: 62 MMHG | HEIGHT: 68 IN | OXYGEN SATURATION: 95 % | TEMPERATURE: 97.5 F | HEART RATE: 65 BPM | BODY MASS INDEX: 26.7 KG/M2

## 2022-04-21 DIAGNOSIS — Z12.5 SCREENING FOR PROSTATE CANCER: ICD-10-CM

## 2022-04-21 DIAGNOSIS — Z00.00 MEDICARE ANNUAL WELLNESS VISIT, SUBSEQUENT: ICD-10-CM

## 2022-04-21 DIAGNOSIS — I10 BENIGN ESSENTIAL HYPERTENSION: Primary | ICD-10-CM

## 2022-04-21 DIAGNOSIS — I48.0 PAROXYSMAL ATRIAL FIBRILLATION (HCC): ICD-10-CM

## 2022-04-21 DIAGNOSIS — Z23 ENCOUNTER FOR VACCINATION: ICD-10-CM

## 2022-04-21 DIAGNOSIS — E03.9 ACQUIRED HYPOTHYROIDISM: ICD-10-CM

## 2022-04-21 DIAGNOSIS — E78.2 MIXED HYPERLIPIDEMIA: ICD-10-CM

## 2022-04-21 DIAGNOSIS — C64.9 MALIGNANT NEOPLASM OF KIDNEY, UNSPECIFIED LATERALITY (HCC): ICD-10-CM

## 2022-04-21 DIAGNOSIS — D50.8 IRON DEFICIENCY ANEMIA SECONDARY TO INADEQUATE DIETARY IRON INTAKE: ICD-10-CM

## 2022-04-21 DIAGNOSIS — Z12.11 SCREEN FOR COLON CANCER: ICD-10-CM

## 2022-04-21 DIAGNOSIS — F41.1 GENERALIZED ANXIETY DISORDER: ICD-10-CM

## 2022-04-21 PROCEDURE — 99214 OFFICE O/P EST MOD 30 MIN: CPT | Performed by: INTERNAL MEDICINE

## 2022-04-21 PROCEDURE — 1123F ACP DISCUSS/DSCN MKR DOCD: CPT | Performed by: INTERNAL MEDICINE

## 2022-04-21 PROCEDURE — G0438 PPPS, INITIAL VISIT: HCPCS | Performed by: INTERNAL MEDICINE

## 2022-04-21 NOTE — PROGRESS NOTES
Assessment/Plan:  Problem List Items Addressed This Visit        Endocrine    Hypothyroidism    Relevant Orders    TSH, 3rd generation       Cardiovascular and Mediastinum    Paroxysmal atrial fibrillation (HCC)    Benign essential hypertension - Primary       Genitourinary    Malignant neoplasm of kidney (HCC)       Other    Iron deficiency anemia secondary to inadequate dietary iron intake    Relevant Orders    Iron Panel (Includes Ferritin, Iron Sat%, Iron, and TIBC)    Hyperlipidemia    Generalized anxiety disorder      Other Visit Diagnoses     Screening for prostate cancer        Relevant Orders    PSA, Total Screen    Medicare annual wellness visit, subsequent        Screen for colon cancer        Relevant Orders    Ambulatory referral for colonoscopy    Encounter for vaccination               Diagnoses and all orders for this visit:    Benign essential hypertension    Acquired hypothyroidism  -     TSH, 3rd generation; Future    Paroxysmal atrial fibrillation (HCC)    Malignant neoplasm of kidney, unspecified laterality (HCC)    Generalized anxiety disorder    Mixed hyperlipidemia    Iron deficiency anemia secondary to inadequate dietary iron intake  -     Iron Panel (Includes Ferritin, Iron Sat%, Iron, and TIBC); Future    Screening for prostate cancer  -     PSA, Total Screen; Future    Medicare annual wellness visit, subsequent    Screen for colon cancer  -     Ambulatory referral for colonoscopy; Future    Encounter for vaccination        No problem-specific Assessment & Plan notes found for this encounter  A/P: Doing well and will check labs  Order CRC  Discussed vaccines defers the pneumonia vaccine  Keep f/u with the eye specialist  Continue current treatment and RTC four months for routine  Subjective:      Patient ID: Aiden Rendon is a 77 y o  male  WM RTC for f/u HTN, Hypothyroidism, etc  Doing well and no new issues  Remains active w/o difficulty and no falls   Renal cancer with mets is in remission  No stroke like events  Chronic pain is manageable  EMBER is controlled  Due for CRC, labs, and vaccines  The following portions of the patient's history were reviewed and updated as appropriate:   He has a past medical history of Abnormal eye finding, Anemia, Atrial fibrillation ([address] ), Generalized anxiety disorder, History of kidney cancer, Hyperlipidemia, and Hypertension  ,  does not have any pertinent problems on file  ,   has a past surgical history that includes External ear surgery; Kidney surgery; Tibia fracture surgery; Colonoscopy (N/A, 3/10/2017); Tonsillectomy (childhood); and Tooth extraction (08/11/2021)  ,  family history includes Cancer in his paternal uncle; No Known Problems in his mother  ,   reports that he quit smoking about 23 years ago  His smoking use included cigarettes  He has never used smokeless tobacco  He reports that he does not drink alcohol and does not use drugs  ,  is allergic to prednisone     Current Outpatient Medications   Medication Sig Dispense Refill    Ascorbic Acid (vitamin C) 100 MG tablet Take 100 mg by mouth daily      aspirin 81 mg chewable tablet Chew 1 tablet (81 mg total) daily 30 tablet 0    Bevacizumab (AVASTIN IV) Infuse into a venous catheter Every 4 weeks       sertraline (ZOLOFT) 50 mg tablet TAKE 1 TABLET DAILY 90 tablet 3    atorvastatin (LIPITOR) 40 mg tablet Take 1 tablet (40 mg total) by mouth every evening (Patient not taking: Reported on 3/4/2022 ) 30 tablet 0    clopidogrel (Plavix) 75 mg tablet Take 1 tablet (75 mg total) by mouth daily (Patient not taking: Reported on 3/4/2022 ) 20 tablet 0    montelukast (SINGULAIR) 10 mg tablet  (Patient not taking: Reported on 3/31/2022 )       No current facility-administered medications for this visit  Review of Systems   Constitutional: Negative for activity change, chills, diaphoresis, fatigue and fever  HENT: Negative  Eyes: Negative for visual disturbance     Respiratory: Negative for cough, chest tightness, shortness of breath and wheezing  Cardiovascular: Negative for chest pain, palpitations and leg swelling  Gastrointestinal: Negative for abdominal pain, constipation, diarrhea, nausea and vomiting  Endocrine: Negative for cold intolerance and heat intolerance  Genitourinary: Negative for difficulty urinating, dysuria and frequency  Musculoskeletal: Negative for arthralgias, gait problem and myalgias  Neurological: Negative for dizziness, seizures, syncope, weakness, light-headedness and headaches  Psychiatric/Behavioral: Negative for confusion, dysphoric mood and sleep disturbance  The patient is not nervous/anxious  PHQ-2/9 Depression Screening    Little interest or pleasure in doing things: 0 - not at all  Feeling down, depressed, or hopeless: 0 - not at all  PHQ-2 Score: 0  PHQ-2 Interpretation: Negative depression screen        Objective:  Vitals:    04/21/22 1044   BP: 106/62   Pulse: 65   Temp: 97 5 °F (36 4 °C)   TempSrc: Tympanic   SpO2: 95%   Weight: 79 9 kg (176 lb 3 2 oz)   Height: 5' 8" (1 727 m)     Body mass index is 26 79 kg/m²  Physical Exam  Vitals and nursing note reviewed  Constitutional:       General: He is not in acute distress  Appearance: Normal appearance  He is not ill-appearing  HENT:      Head: Normocephalic and atraumatic  Mouth/Throat:      Mouth: Mucous membranes are moist    Eyes:      Extraocular Movements: Extraocular movements intact  Conjunctiva/sclera: Conjunctivae normal       Pupils: Pupils are equal, round, and reactive to light  Cardiovascular:      Rate and Rhythm: Normal rate and regular rhythm  Heart sounds: Normal heart sounds  Pulmonary:      Effort: Pulmonary effort is normal  No respiratory distress  Breath sounds: Normal breath sounds  No wheezing or rales  Abdominal:      General: Bowel sounds are normal  There is no distension  Palpations: Abdomen is soft  Tenderness: There is no abdominal tenderness  Musculoskeletal:      Cervical back: Neck supple  Right lower leg: No edema  Left lower leg: No edema  Neurological:      General: No focal deficit present  Mental Status: He is alert and oriented to person, place, and time  Mental status is at baseline  Psychiatric:         Mood and Affect: Mood normal          Behavior: Behavior normal          Thought Content:  Thought content normal          Judgment: Judgment normal

## 2022-04-21 NOTE — PATIENT INSTRUCTIONS
Chronic Hypertension   AMBULATORY CARE:   Hypertension  is high blood pressure  Your blood pressure is the force of your blood moving against the walls of your arteries  Hypertension causes your blood pressure to get so high that your heart has to work much harder than normal  This can damage your heart  Even if you have hypertension for years, lifestyle changes, medicines, or both can help bring your blood pressure to normal   Call your local emergency number (911 in the [address] [facility]) or have someone call if:   · You have chest pain  · You have any of the following signs of a heart attack:      ? Squeezing, pressure, or pain in your chest    ? You may  also have any of the following:     § Discomfort or pain in your back, neck, jaw, stomach, or arm    § Shortness of breath    § Nausea or vomiting    § Lightheadedness or a sudden cold sweat    · You become confused or have difficulty speaking  · You suddenly feel lightheaded or have trouble breathing  Seek care immediately if:   · You have a severe headache or vision loss  · You have weakness in an arm or leg  Call your doctor or cardiologist if:   · You feel faint, dizzy, confused, or drowsy  · You have been taking your blood pressure medicine but your pressure is higher than your provider says it should be  · You have questions or concerns about your condition or care  Treatment for chronic hypertension  may include medicine to lower your blood pressure and cholesterol levels  A low cholesterol level helps prevent heart disease and makes it easier to control your blood pressure  Heart disease can make your blood pressure harder to control  You may also need to make lifestyle changes  What you need to know about the stages of hypertension:       · Normal blood pressure is 119/79 or lower   Your healthcare provider may only check your blood pressure each year if it stays at a normal level  · Elevated blood pressure is 120/79 to 129/79    This is sometimes called prehypertension  Your healthcare provider may suggest lifestyle changes to help lower your blood pressure to a normal level  He or she may then check it again in 3 to 6 months  · Stage 1 hypertension is 130/80  to 139/89   Your provider may recommend lifestyle changes, medication, and checks every 3 to 6 months until your blood pressure is controlled  · Stage 2 hypertension is 140/90 or higher   Your provider will recommend lifestyle changes and have you take 2 kinds of hypertension medicines  You will also need to have your blood pressure checked monthly until it is controlled  Manage chronic hypertension:   · Check your blood pressure at home  Avoid smoking, caffeine, and exercise at least 30 minutes before checking your blood pressure  Sit and rest for 5 minutes before you take your blood pressure  Extend your arm and support it on a flat surface  Your arm should be at the same level as your heart  Follow the directions that came with your blood pressure monitor  Check your blood pressure 2 times, 1 minute apart, before you take your medicine in the morning  Also check your blood pressure before your evening meal  Keep a record of your readings and bring it to your follow-up visits  Ask your healthcare provider what your blood pressure should be  · Manage any other health conditions you have  Health conditions such as diabetes can increase your risk for hypertension  Follow your healthcare provider's instructions and take all your medicines as directed  Talk to your healthcare provider about any new health conditions you have recently developed  · Ask about all medicines  Certain medicines can increase your blood pressure  Examples include oral birth control pills, decongestants, herbal supplements, and NSAIDs, such as ibuprofen  Your healthcare provider can tell you which medicines are safe for you to take   This includes prescription and over-the-counter medicines  Lifestyle changes you can make to lower your blood pressure: Your provider may want you to make more lifestyle changes if you are having trouble controlling your blood pressure  This may feel difficult over time, especially if you think you are making good changes but your pressure is still high  It might help to focus on one new change at a time  For example, try to add 1 more day of exercise, or exercise for an extra 10 minutes on 2 days  Small changes can make a big difference  Your healthcare provider can also refer you to specialists such as a dietitian who can help you make small changes  Your family members may be included in helping you learn to create lifestyle changes, such as the following:     · Limit sodium (salt) as directed  Too much sodium can affect your fluid balance  Check labels to find low-sodium or no-salt-added foods  Some low-sodium foods use potassium salts for flavor  Too much potassium can also cause health problems  Your healthcare provider will tell you how much sodium and potassium are safe for you to have in a day  He or she may recommend that you limit sodium to 2,300 mg a day  · Follow the meal plan recommended by your healthcare provider  A dietitian or your provider can give you more information on low-sodium plans or the DASH (Dietary Approaches to Stop Hypertension) eating plan  The DASH plan is low in sodium, processed sugar, unhealthy fats, and total fat  It is high in potassium, calcium, and fiber  These can be found in vegetables, fruit, and whole-grain foods  · Be physically active throughout the day  Physical activity, such as exercise, can help control your blood pressure and your weight  Be physically active for at least 30 minutes per day, on most days of the week  Include aerobic activity, such as walking or riding a bicycle  Also include strength training at least 2 times each week   Your healthcare providers can help you create a physical activity plan  · Decrease stress  This may help lower your blood pressure  Learn ways to relax, such as deep breathing or listening to music  · Limit alcohol as directed  Alcohol can increase your blood pressure  A drink of alcohol is 12 ounces of beer, 5 ounces of wine, or 1½ ounces of liquor  · Do not smoke  Nicotine and other chemicals in cigarettes and cigars can increase your blood pressure and also cause lung damage  Ask your healthcare provider for information if you currently smoke and need help to quit  E-cigarettes or smokeless tobacco still contain nicotine  Talk to your healthcare provider before you use these products  Follow up with your doctor or cardiologist as directed: You will need to return to have your blood pressure checked and to have other lab tests done  Write down your questions so you remember to ask them during your visits  © Copyright [employer] 2022 Information is for End User's use only and may not be sold, redistributed or otherwise used for commercial purposes  All illustrations and images included in CareNotes® are the copyrighted property of A [employer] A M [employer]  or [address] [place], [place] [address]   The above information is an  only  It is not intended as medical advice for individual conditions or treatments  Talk to your doctor, nurse or pharmacist before following any medical regimen to see if it is safe and effective for you  Medicare Preventive Visit Patient Instructions  Thank you for completing your Welcome to Medicare Visit or Medicare Annual Wellness Visit today  Your next wellness visit will be due in one year (4/22/2023)  The screening/preventive services that you may require over the next 5-10 years are detailed below  Some tests may not apply to you based off risk factors and/or age  Screening tests ordered at today's visit but not completed yet may show as past due   Also, please note that scanned in results may not display below   Preventive Screenings:  Service Recommendations Previous Testing/Comments   Colorectal Cancer Screening  · Colonoscopy    · Fecal Occult Blood Test (FOBT)/Fecal Immunochemical Test (FIT)  · Fecal DNA/Cologuard Test  · Flexible Sigmoidoscopy Age: 54-65 years old   Colonoscopy: every 10 years (May be performed more frequently if at higher risk)  OR  FOBT/FIT: every 1 year  OR  Cologuard: every 3 years  OR  Sigmoidoscopy: every 5 years  Screening may be recommended earlier than age 48 if at higher risk for colorectal cancer  Also, an individualized decision between you and your healthcare provider will decide whether screening between the ages of 74-80 would be appropriate  Colonoscopy: 03/10/2017  FOBT/FIT: Not on file  Cologuard: Not on file  Sigmoidoscopy: Not on file          Prostate Cancer Screening Individualized decision between patient and health care provider in men between ages of 53-78   Medicare will cover every 12 months beginning on the day after your 50th birthday PSA: 0 8 ng/mL     Screening Current     Hepatitis C Screening Once for adults born between 1945 and 1965  More frequently in patients at high risk for Hepatitis C Hep C Antibody: 09/26/2018    Screening Current   Diabetes Screening 1-2 times per year if you're at risk for diabetes or have pre-diabetes Fasting glucose: 86 mg/dL   A1C: 5 3 %    Screening Current   Cholesterol Screening Once every 5 years if you don't have a lipid disorder  May order more often based on risk factors  Lipid panel: 01/11/2022    Screening Not Indicated  History Lipid Disorder      Other Preventive Screenings Covered by Medicare:  1  Abdominal Aortic Aneurysm (AAA) Screening: covered once if your at risk  You're considered to be at risk if you have a family history of AAA or a male between the age of 73-68 who smoking at least 100 cigarettes in your lifetime    2  Lung Cancer Screening: covers low dose CT scan once per year if you meet all of the following conditions: (1) Age 50-69; (2) No signs or symptoms of lung cancer; (3) Current smoker or have quit smoking within the last 15 years; (4) You have a tobacco smoking history of at least 30 pack years (packs per day x number of years you smoked); (5) You get a written order from a healthcare provider  3  Glaucoma Screening: covered annually if you're considered high risk: (1) You have diabetes OR (2) Family history of glaucoma OR (3)  aged 48 and older OR (3)  American aged 72 and older  3  Osteoporosis Screening: covered every 2 years if you meet one of the following conditions: (1) Have a vertebral abnormality; (2) On glucocorticoid therapy for more than 3 months; (3) Have primary hyperparathyroidism; (4) On osteoporosis medications and need to assess response to drug therapy  5  HIV Screening: covered annually if you're between the age of 12-76  Also covered annually if you are younger than 13 and older than 72 with risk factors for HIV infection  For pregnant patients, it is covered up to 3 times per pregnancy  Immunizations:  Immunization Recommendations   Influenza Vaccine Annual influenza vaccination during flu season is recommended for all persons aged >= 6 months who do not have contraindications   Pneumococcal Vaccine (Prevnar and Pneumovax)  * Prevnar = PCV13  * Pneumovax = PPSV23 Adults 25-60 years old: 1-3 doses may be recommended based on certain risk factors  Adults 72 years old: Prevnar (PCV13) vaccine recommended followed by Pneumovax (PPSV23) vaccine  If already received PPSV23 since turning 65, then PCV13 recommended at least one year after PPSV23 dose  Hepatitis B Vaccine 3 dose series if at intermediate or high risk (ex: diabetes, end stage renal disease, liver disease)   Tetanus (Td) Vaccine - COST NOT COVERED BY MEDICARE PART B Following completion of primary series, a booster dose should be given every 10 years to maintain immunity against tetanus   Td may also be given as tetanus wound prophylaxis  Tdap Vaccine - COST NOT COVERED BY MEDICARE PART B Recommended at least once for all adults  For pregnant patients, recommended with each pregnancy  Shingles Vaccine (Shingrix) - COST NOT COVERED BY MEDICARE PART B  2 shot series recommended in those aged 48 and above     Health Maintenance Due:      Topic Date Due    Colorectal Cancer Screening  03/10/2022    Hepatitis C Screening  Completed     Immunizations Due:      Topic Date Due    Pneumococcal Vaccine: 65+ Years (1 of 4 - PCV13) Never done    COVID-19 Vaccine (4 - Booster for Ayers Peter series) 04/22/2022     Advance Directives   What are advance directives? Advance directives are legal documents that state your wishes and plans for medical care  These plans are made ahead of time in case you lose your ability to make decisions for yourself  Advance directives can apply to any medical decision, such as the treatments you want, and if you want to donate organs  What are the types of advance directives? There are many types of advance directives, and each state has rules about how to use them  You may choose a combination of any of the following:  · Living will: This is a written record of the treatment you want  You can also choose which treatments you do not want, which to limit, and which to stop at a certain time  This includes surgery, medicine, IV fluid, and tube feedings  · Durable power of  for healthcare [facility]): This is a written record that states who you want to make healthcare choices for you when you are unable to make them for yourself  This person, called a proxy, is usually a family member or a friend  You may choose more than 1 proxy  · Do not resuscitate (DNR) order:  A DNR order is used in case your heart stops beating or you stop breathing  It is a request not to have certain forms of treatment, such as CPR  A DNR order may be included in other types of advance directives    · Medical directive: This covers the care that you want if you are in a coma, near death, or unable to make decisions for yourself  You can list the treatments you want for each condition  Treatment may include pain medicine, surgery, blood transfusions, dialysis, IV or tube feedings, and a ventilator (breathing machine)  · Values history: This document has questions about your views, beliefs, and how you feel and think about life  This information can help others choose the care that you would choose  Why are advance directives important? An advance directive helps you control your care  Although spoken wishes may be used, it is better to have your wishes written down  Spoken wishes can be misunderstood, or not followed  Treatments may be given even if you do not want them  An advance directive may make it easier for your family to make difficult choices about your care  Fall Prevention    Fall prevention  includes ways to make your home and other areas safer  It also includes ways you can move more carefully to prevent a fall  Health conditions that cause changes in your blood pressure, vision, or muscle strength and coordination may increase your risk for falls  Medicines may also increase your risk for falls if they make you dizzy, weak, or sleepy  Fall prevention tips:   · Stand or sit up slowly  · Use assistive devices as directed  · Wear shoes that fit well and have soles that   · Wear a personal alarm  · Stay active  · Manage your medical conditions  Home Safety Tips:  · Add items to prevent falls in the bathroom  · Keep paths clear  · Install bright lights in your home  · Keep items you use often on shelves within reach  · Paint or place reflective tape on the edges of your stairs      Weight Management   Why it is important to manage your weight:  Being overweight increases your risk of health conditions such as heart disease, high blood pressure, type 2 diabetes, and certain types of cancer  It can also increase your risk for osteoarthritis, sleep apnea, and other respiratory problems  Aim for a slow, steady weight loss  Even a small amount of weight loss can lower your risk of health problems  How to lose weight safely:  A safe and healthy way to lose weight is to eat fewer calories and get regular exercise  You can lose up about 1 pound a week by decreasing the number of calories you eat by 500 calories each day  Healthy meal plan for weight management:  A healthy meal plan includes a variety of foods, contains fewer calories, and helps you stay healthy  A healthy meal plan includes the following:  · Eat whole-grain foods more often  A healthy meal plan should contain fiber  Fiber is the part of grains, fruits, and vegetables that is not broken down by your body  Whole-grain foods are healthy and provide extra fiber in your diet  Some examples of whole-grain foods are whole-wheat breads and pastas, oatmeal, brown rice, and bulgur  · Eat a variety of vegetables every day  Include dark, leafy greens such as spinach, kale, lyndsay greens, and mustard greens  Eat yellow and orange vegetables such as carrots, sweet potatoes, and winter squash  · Eat a variety of fruits every day  Choose fresh or canned fruit (canned in its own juice or light syrup) instead of juice  Fruit juice has very little or no fiber  · Eat low-fat dairy foods  Drink fat-free (skim) milk or 1% milk  Eat fat-free yogurt and low-fat cottage cheese  Try low-fat cheeses such as mozzarella and other reduced-fat cheeses  · Choose meat and other protein foods that are low in fat  Choose beans or other legumes such as split peas or lentils  Choose fish, skinless poultry (chicken or turkey), or lean cuts of red meat (beef or pork)  Before you cook meat or poultry, cut off any visible fat  · Use less fat and oil  Try baking foods instead of frying them   Add less fat, such as margarine, sour cream, regular salad dressing and mayonnaise to foods  Eat fewer high-fat foods  Some examples of high-fat foods include french fries, doughnuts, ice cream, and cakes  · Eat fewer sweets  Limit foods and drinks that are high in sugar  This includes candy, cookies, regular soda, and sweetened drinks  Exercise:  Exercise at least 30 minutes per day on most days of the week  Some examples of exercise include walking, biking, dancing, and swimming  You can also fit in more physical activity by taking the stairs instead of the elevator or parking farther away from stores  Ask your healthcare provider about the best exercise plan for you  © Copyright [employer] 2018 Information is for End User's use only and may not be sold, redistributed or otherwise used for commercial purposes  All illustrations and images included in CareNotes® are the copyrighted property of [employer]  or [employer] [facility] Preventive Visit Patient Instructions  Thank you for completing your Welcome to Medicare Visit or Medicare Annual Wellness Visit today  Your next wellness visit will be due in one year (4/22/2023)  The screening/preventive services that you may require over the next 5-10 years are detailed below  Some tests may not apply to you based off risk factors and/or age  Screening tests ordered at today's visit but not completed yet may show as past due  Also, please note that scanned in results may not display below    Preventive Screenings:  Service Recommendations Previous Testing/Comments   Colorectal Cancer Screening  · Colonoscopy    · Fecal Occult Blood Test (FOBT)/Fecal Immunochemical Test (FIT)  · Fecal DNA/Cologuard Test  · Flexible Sigmoidoscopy Age: 54-65 years old   Colonoscopy: every 10 years (May be performed more frequently if at higher risk)  OR  FOBT/FIT: every 1 year  OR  Cologuard: every 3 years  OR  Sigmoidoscopy: every 5 years  Screening may be recommended earlier than age 48 if at higher risk for colorectal cancer  Also, an individualized decision between you and your healthcare provider will decide whether screening between the ages of 74-80 would be appropriate  Colonoscopy: 03/10/2017  FOBT/FIT: Not on file  Cologuard: Not on file  Sigmoidoscopy: Not on file          Prostate Cancer Screening Individualized decision between patient and health care provider in men between ages of 53-78   Medicare will cover every 12 months beginning on the day after your 50th birthday PSA: 0 8 ng/mL     Screening Current     Hepatitis C Screening Once for adults born between 1945 and 1965  More frequently in patients at high risk for Hepatitis C Hep C Antibody: 09/26/2018    Screening Current   Diabetes Screening 1-2 times per year if you're at risk for diabetes or have pre-diabetes Fasting glucose: 86 mg/dL   A1C: 5 3 %    Screening Current   Cholesterol Screening Once every 5 years if you don't have a lipid disorder  May order more often based on risk factors  Lipid panel: 01/11/2022    Screening Not Indicated  History Lipid Disorder      Other Preventive Screenings Covered by Medicare:  6  Abdominal Aortic Aneurysm (AAA) Screening: covered once if your at risk  You're considered to be at risk if you have a family history of AAA or a male between the age of 73-68 who smoking at least 100 cigarettes in your lifetime  7  Lung Cancer Screening: covers low dose CT scan once per year if you meet all of the following conditions: (1) Age 50-69; (2) No signs or symptoms of lung cancer; (3) Current smoker or have quit smoking within the last 15 years; (4) You have a tobacco smoking history of at least 30 pack years (packs per day x number of years you smoked); (5) You get a written order from a healthcare provider    8  Glaucoma Screening: covered annually if you're considered high risk: (1) You have diabetes OR (2) Family history of glaucoma OR (3)  aged 48 and older OR (3)  American aged 72 and older  9  Osteoporosis Screening: covered every 2 years if you meet one of the following conditions: (1) Have a vertebral abnormality; (2) On glucocorticoid therapy for more than 3 months; (3) Have primary hyperparathyroidism; (4) On osteoporosis medications and need to assess response to drug therapy  10  HIV Screening: covered annually if you're between the age of 12-76  Also covered annually if you are younger than 13 and older than 72 with risk factors for HIV infection  For pregnant patients, it is covered up to 3 times per pregnancy  Immunizations:  Immunization Recommendations   Influenza Vaccine Annual influenza vaccination during flu season is recommended for all persons aged >= 6 months who do not have contraindications   Pneumococcal Vaccine (Prevnar and Pneumovax)  * Prevnar = PCV13  * Pneumovax = PPSV23 Adults 25-60 years old: 1-3 doses may be recommended based on certain risk factors  Adults 72 years old: Prevnar (PCV13) vaccine recommended followed by Pneumovax (PPSV23) vaccine  If already received PPSV23 since turning 65, then PCV13 recommended at least one year after PPSV23 dose  Hepatitis B Vaccine 3 dose series if at intermediate or high risk (ex: diabetes, end stage renal disease, liver disease)   Tetanus (Td) Vaccine - COST NOT COVERED BY MEDICARE PART B Following completion of primary series, a booster dose should be given every 10 years to maintain immunity against tetanus  Td may also be given as tetanus wound prophylaxis  Tdap Vaccine - COST NOT COVERED BY MEDICARE PART B Recommended at least once for all adults  For pregnant patients, recommended with each pregnancy     Shingles Vaccine (Shingrix) - COST NOT COVERED BY MEDICARE PART B  2 shot series recommended in those aged 48 and above     Health Maintenance Due:      Topic Date Due    Colorectal Cancer Screening  03/10/2022    Hepatitis C Screening  Completed     Immunizations Due:      Topic Date Due    Pneumococcal Vaccine: 65+ Years (1 of 4 - PCV13) Never done    COVID-19 Vaccine (4 - Booster for Ayers Reynold series) 04/22/2022     Advance Directives   What are advance directives? Advance directives are legal documents that state your wishes and plans for medical care  These plans are made ahead of time in case you lose your ability to make decisions for yourself  Advance directives can apply to any medical decision, such as the treatments you want, and if you want to donate organs  What are the types of advance directives? There are many types of advance directives, and each state has rules about how to use them  You may choose a combination of any of the following:  · Living will: This is a written record of the treatment you want  You can also choose which treatments you do not want, which to limit, and which to stop at a certain time  This includes surgery, medicine, IV fluid, and tube feedings  · Durable power of  for healthcare [facility] [place]): This is a written record that states who you want to make healthcare choices for you when you are unable to make them for yourself  This person, called a proxy, is usually a family member or a friend  You may choose more than 1 proxy  · Do not resuscitate (DNR) order:  A DNR order is used in case your heart stops beating or you stop breathing  It is a request not to have certain forms of treatment, such as CPR  A DNR order may be included in other types of advance directives  · Medical directive: This covers the care that you want if you are in a coma, near death, or unable to make decisions for yourself  You can list the treatments you want for each condition  Treatment may include pain medicine, surgery, blood transfusions, dialysis, IV or tube feedings, and a ventilator (breathing machine)  · Values history: This document has questions about your views, beliefs, and how you feel and think about life  This information can help others choose the care that you would choose    Why are advance directives important? An advance directive helps you control your care  Although spoken wishes may be used, it is better to have your wishes written down  Spoken wishes can be misunderstood, or not followed  Treatments may be given even if you do not want them  An advance directive may make it easier for your family to make difficult choices about your care  Fall Prevention    Fall prevention  includes ways to make your home and other areas safer  It also includes ways you can move more carefully to prevent a fall  Health conditions that cause changes in your blood pressure, vision, or muscle strength and coordination may increase your risk for falls  Medicines may also increase your risk for falls if they make you dizzy, weak, or sleepy  Fall prevention tips:   · Stand or sit up slowly  · Use assistive devices as directed  · Wear shoes that fit well and have soles that   · Wear a personal alarm  · Stay active  · Manage your medical conditions  Home Safety Tips:  · Add items to prevent falls in the bathroom  · Keep paths clear  · Install bright lights in your home  · Keep items you use often on shelves within reach  · Paint or place reflective tape on the edges of your stairs  Weight Management   Why it is important to manage your weight:  Being overweight increases your risk of health conditions such as heart disease, high blood pressure, type 2 diabetes, and certain types of cancer  It can also increase your risk for osteoarthritis, sleep apnea, and other respiratory problems  Aim for a slow, steady weight loss  Even a small amount of weight loss can lower your risk of health problems  How to lose weight safely:  A safe and healthy way to lose weight is to eat fewer calories and get regular exercise  You can lose up about 1 pound a week by decreasing the number of calories you eat by 500 calories each day     Healthy meal plan for weight management:  A healthy meal plan includes a variety of foods, contains fewer calories, and helps you stay healthy  A healthy meal plan includes the following:  · Eat whole-grain foods more often  A healthy meal plan should contain fiber  Fiber is the part of grains, fruits, and vegetables that is not broken down by your body  Whole-grain foods are healthy and provide extra fiber in your diet  Some examples of whole-grain foods are whole-wheat breads and pastas, oatmeal, brown rice, and bulgur  · Eat a variety of vegetables every day  Include dark, leafy greens such as spinach, kale, lyndsay greens, and mustard greens  Eat yellow and orange vegetables such as carrots, sweet potatoes, and winter squash  · Eat a variety of fruits every day  Choose fresh or canned fruit (canned in its own juice or light syrup) instead of juice  Fruit juice has very little or no fiber  · Eat low-fat dairy foods  Drink fat-free (skim) milk or 1% milk  Eat fat-free yogurt and low-fat cottage cheese  Try low-fat cheeses such as mozzarella and other reduced-fat cheeses  · Choose meat and other protein foods that are low in fat  Choose beans or other legumes such as split peas or lentils  Choose fish, skinless poultry (chicken or turkey), or lean cuts of red meat (beef or pork)  Before you cook meat or poultry, cut off any visible fat  · Use less fat and oil  Try baking foods instead of frying them  Add less fat, such as margarine, sour cream, regular salad dressing and mayonnaise to foods  Eat fewer high-fat foods  Some examples of high-fat foods include french fries, doughnuts, ice cream, and cakes  · Eat fewer sweets  Limit foods and drinks that are high in sugar  This includes candy, cookies, regular soda, and sweetened drinks  Exercise:  Exercise at least 30 minutes per day on most days of the week  Some examples of exercise include walking, biking, dancing, and swimming   You can also fit in more physical activity by taking the stairs instead of the elevator or parking farther away from stores  Ask your healthcare provider about the best exercise plan for you  © Copyright [employer] 2018 Information is for End User's use only and may not be sold, redistributed or otherwise used for commercial purposes   All illustrations and images included in CareNotes® are the copyrighted property of [employer]  or [address] [place] [address] [employer]

## 2022-04-21 NOTE — PROGRESS NOTES
Assessment and Plan:     Problem List Items Addressed This Visit        Endocrine    Hypothyroidism    Relevant Orders    TSH, 3rd generation       Cardiovascular and Mediastinum    Paroxysmal atrial fibrillation (HCC)    Benign essential hypertension - Primary       Genitourinary    Malignant neoplasm of kidney (HCC)       Other    Iron deficiency anemia secondary to inadequate dietary iron intake    Relevant Orders    Iron Panel (Includes Ferritin, Iron Sat%, Iron, and TIBC)    Hyperlipidemia    Generalized anxiety disorder      Other Visit Diagnoses     Screening for prostate cancer        Relevant Orders    PSA, Total Screen    Medicare annual wellness visit, subsequent        Screen for colon cancer        Encounter for vaccination               Preventive health issues were discussed with patient, and age appropriate screening tests were ordered as noted in patient's After Visit Summary  Personalized health advice and appropriate referrals for health education or preventive services given if needed, as noted in patient's After Visit Summary       History of Present Illness:     Patient presents for Medicare Annual Wellness visit    Patient Care Team:  Joshua Quevedo DO as PCP - General (Internal Medicine)  MD Rmoe Faye MD as Endoscopist     Problem List:     Patient Active Problem List   Diagnosis    Benign essential hypertension    Bone metastasis ([address]    Degenerative disk disease    Generalized anxiety disorder    Generalized osteoarthritis    Hyperlipidemia    Iron deficiency anemia secondary to inadequate dietary iron intake    Macular edema    Malignant neoplasm of kidney ([address]    Metastasis to brain ([address] )    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 ([address] )    Malignant neoplasm metastatic to bone ([address] )    Paroxysmal atrial fibrillation ([address] )    Personal history of radiation therapy    Pulmonary nodules    Benign colonic polyp    Hypothyroidism    Cholelithiasis    TIA (transient ischemic attack)    Abscess of skin of right hip      Past Medical and Surgical History:     Past Medical History:   Diagnosis Date    Abnormal eye finding     last assessed 2015    Anemia     Atrial fibrillation ([address] )     patient denies    Generalized anxiety disorder     History of kidney cancer     Hyperlipidemia     no longer    Hypertension     no longer     Past Surgical History:   Procedure Laterality Date    COLONOSCOPY N/A 3/10/2017    Procedure: COLONOSCOPY;  Surgeon: Meyer Peabody, MD;  Location: [facility];  Service:    Normswetha Glory EXTERNAL EAR SURGERY      KIDNEY SURGERY      TIBIA FRACTURE SURGERY      TONSILLECTOMY  childhood    TOOTH EXTRACTION  2021      Family History:     Family History   Problem Relation Age of Onset    No Known Problems Mother     Cancer Paternal Uncle       Social History:     Social History     Socioeconomic History    Marital status: /Civil Union     Spouse name: None    Number of children: None    Years of education: None    Highest education level: None   Occupational History    Occupation: Retired     Employer: OTHER   Tobacco Use    Smoking status: Former Smoker     Types: Cigarettes     Quit date:      Years since quittin 3    Smokeless tobacco: Never Used    Tobacco comment: 2 cigarettes   Vaping Use    Vaping Use: Never used   Substance and Sexual Activity    Alcohol use: No    Drug use: Never    Sexual activity: None   Other Topics Concern    None   Social History Narrative    Caffeine use- 1/2 caf 1/2 dec   Coffee- 1 1/2 cups     Social Determinants of Health     Financial Resource Strain: Not on file   Food Insecurity: Not on file   Transportation Needs: Not on file   Physical Activity: Not on file   Stress: Not on file   Social Connections: Not on file   Intimate Partner Violence: Not on file   Housing Stability: Not on file      Medications and Allergies:     Current Outpatient Medications   Medication Sig Dispense Refill    Ascorbic Acid (vitamin C) 100 MG tablet Take 100 mg by mouth daily      aspirin 81 mg chewable tablet Chew 1 tablet (81 mg total) daily 30 tablet 0    Bevacizumab (AVASTIN IV) Infuse into a venous catheter Every 4 weeks       sertraline (ZOLOFT) 50 mg tablet TAKE 1 TABLET DAILY 90 tablet 3    atorvastatin (LIPITOR) 40 mg tablet Take 1 tablet (40 mg total) by mouth every evening (Patient not taking: Reported on 3/4/2022 ) 30 tablet 0    clopidogrel (Plavix) 75 mg tablet Take 1 tablet (75 mg total) by mouth daily (Patient not taking: Reported on 3/4/2022 ) 20 tablet 0    montelukast (SINGULAIR) 10 mg tablet  (Patient not taking: Reported on 3/31/2022 )       No current facility-administered medications for this visit  Allergies   Allergen Reactions    Prednisone GI Intolerance      Immunizations:     Immunization History   Administered Date(s) Administered    COVID-19 PFIZER VACCINE 0 3 ML IM 02/28/2021, 03/22/2021, 11/22/2021    INFLUENZA 09/29/2014, 10/15/2015, 10/03/2016, 10/02/2017    Influenza Quadrivalent Preservative Free 3 years and older IM 10/15/2015, 10/03/2016    Influenza Quadrivalent, 6-35 Months IM 10/02/2017    Influenza, high dose seasonal 0 7 mL 10/22/2020, 09/30/2021    Influenza, recombinant, quadrivalent,injectable, preservative free 09/26/2018, 09/16/2019    Influenza, seasonal, injectable 10/01/2013, 09/29/2014    Td (adult), adsorbed 01/01/2004    Tdap 08/18/2015      Health Maintenance:         Topic Date Due    Colorectal Cancer Screening  03/10/2022    Hepatitis C Screening  Completed         Topic Date Due    Pneumococcal Vaccine: 65+ Years (1 of 4 - PCV13) Never done    COVID-19 Vaccine (4 - Booster for Aurality series) 04/22/2022      Medicare Health Risk Assessment:     /62   Pulse 65   Temp 97 5 °F (36 4 °C) (Tympanic)   Ht 5' 8" (1 727 m)   Wt 79 9 kg (176 lb 3 2 oz)   SpO2 95%   BMI 26 79 kg/m²      Monserrat Joseph is here for his Subsequent Wellness visit   Last Medicare Wellness visit information reviewed, patient interviewed and updates made to the record today  Health Risk Assessment:   Patient rates overall health as very good  Patient feels that their physical health rating is slightly better  Patient is very satisfied with their life  Eyesight was rated as same  Hearing was rated as same  Patient feels that their emotional and mental health rating is slightly better  Patients states they are sometimes angry  Patient states they are sometimes unusually tired/fatigued  Pain experienced in the last 7 days has been none  Patient states that he has experienced no weight loss or gain in last 6 months  Depression Screening:   PHQ-2 Score: 0      Fall Risk Screening: In the past year, patient has experienced: history of falling in past year    Number of falls: 1  Injured during fall?: No    Feels unsteady when standing or walking?: No    Worried about falling?: No      Home Safety:  Patient does not have trouble with stairs inside or outside of their home  Patient has working smoke alarms and has no working carbon monoxide detector  Home safety hazards include: none  Nutrition:   Current diet is Regular  Medications:   Patient is currently taking over-the-counter supplements  OTC medications include: see medication list  Patient is able to manage medications  Activities of Daily Living (ADLs)/Instrumental Activities of Daily Living (IADLs):   Walk and transfer into and out of bed and chair?: Yes  Dress and groom yourself?: Yes    Bathe or shower yourself?: Yes    Feed yourself?  Yes  Do your laundry/housekeeping?: Yes  Manage your money, pay your bills and track your expenses?: Yes  Make your own meals?: No    Do your own shopping?: No    Previous Hospitalizations:   Any hospitalizations or ED visits within the last 12 months?: Yes    How many hospitalizations have you had in the last year?: 1-2    Hospitalization Comments: Unconfirmed TIA    Advance Care Planning:   Living will: Yes    Durable POA for healthcare: Yes    Advanced directive: Yes    Advanced directive counseling given: Yes    Five wishes given: No    Patient declined ACP directive: No    End of Life Decisions reviewed with patient: Yes    Provider agrees with end of life decisions: Yes      Cognitive Screening:   Provider or family/friend/caregiver concerned regarding cognition?: No    PREVENTIVE SCREENINGS      Cardiovascular Screening:    General: Screening Not Indicated and History Lipid Disorder    Due for: Lipid Panel      Diabetes Screening:     General: Screening Current    Due for: Blood Glucose      Colorectal Cancer Screening:     General: Risks and Benefits Discussed    Due for: Colonoscopy - Low Risk      Prostate Cancer Screening:    General: Screening Current    Due for: PSA      Osteoporosis Screening:    General: Screening Not Indicated      Abdominal Aortic Aneurysm (AAA) Screening:    Risk factors include: age between 73-67 yo and tobacco use        Lung Cancer Screening:     General: Screening Not Indicated and History Lung Cancer      Hepatitis C Screening:    General: Screening Current    Screening, Brief Intervention, and Referral to Treatment (SBIRT)    Screening  Typical number of drinks in a day: 0  Typical number of drinks in a week: 0  Interpretation: Low risk drinking behavior  Single Item Drug Screening:  How often have you used an illegal drug (including marijuana) or a prescription medication for non-medical reasons in the past year? never    Single Item Drug Screen Score: 0  Interpretation: Negative screen for possible drug use disorder    Other Counseling Topics:   Car/seat belt/driving safety, sunscreen and calcium and vitamin D intake and regular weightbearing exercise  A/P: Doing well and no falls reported  Denies depression and feels safe at home  Diverse diet  No problems operating a MV and uses seat belts  Has a living will and POA   No DME or referrals needed today  RTC one year for medicare wellness       Dorian Alcocer, DO

## 2022-04-25 ENCOUNTER — OFFICE VISIT (OUTPATIENT)
Dept: SURGERY | Facility: CLINIC | Age: 67
End: 2022-04-25
Payer: MEDICARE

## 2022-04-25 VITALS — TEMPERATURE: 98.1 F

## 2022-04-25 DIAGNOSIS — L02.415 ABSCESS OF SKIN OF RIGHT HIP: Primary | ICD-10-CM

## 2022-04-25 PROCEDURE — 99213 OFFICE O/P EST LOW 20 MIN: CPT | Performed by: SURGERY

## 2022-04-25 NOTE — PROGRESS NOTES
Assessment:  78 yo M S/P I&D of R hip 3 weeks ago  PMH metastatic disease, HTN and TIA  No complaints today  Denies pain, fever, chills, CP and SOB  At home wound care is going well  Plan:  Wound debridement and redress with triple antibiotic  Educated on further at home wound care  F/u in 3 months to reevaluate for possible repeat I&D    No problem-specific Assessment & Plan notes found for this encounter  Diagnoses and all orders for this visit:    Abscess of skin of right hip          Subjective: No complaints on today's visit  States he is tolerated the at home wound care  His wife is helping to apply the triple antibiotic  Patient ID: Judy Hussein is a 77 y o  male  HPI    The following portions of the patient's history were reviewed and updated as appropriate: allergies, current medications, past family history, past medical history, past social history, past surgical history and problem list     Review of Systems   Constitutional: Negative  Respiratory: Negative  Cardiovascular: Negative  Gastrointestinal: Negative  Skin: Negative  All other systems reviewed and are negative  Objective:  Temp 98 1 °F (36 7 °C) (Temporal)      Physical Exam  Constitutional:       Appearance: Normal appearance  HENT:      Head: Normocephalic  Eyes:      Conjunctiva/sclera: Conjunctivae normal    Cardiovascular:      Rate and Rhythm: Normal rate and regular rhythm  Pulses: Normal pulses  Heart sounds: Normal heart sounds  Pulmonary:      Effort: Pulmonary effort is normal       Breath sounds: Normal breath sounds  Abdominal:      General: Abdomen is flat  Bowel sounds are normal       Palpations: Abdomen is soft  Skin:     Comments: Open healing wound on R hip  Covered with a Band-Aid  No erythema or signs of active infection  Non-tender    Neurological:      Mental Status: He is alert and oriented to person, place, and time     Psychiatric:         Mood and Affect: Mood normal

## 2022-04-25 NOTE — PROGRESS NOTES
Assessment/Plan:    Abscess of skin of right hip  Patient returns for routine scheduled follow-up status post I&D of the right hip abscess  Patient voiced no new complaints  Continues to performed daily wound care with the assistance of his wife who is applying a topical antibiotic and a Band-Aid to the wound  On physical examination he is well-appearing male  He is in no acute distress  Awake alert oriented  Pleasant competent reliable as a historian  The wound is clean and granulating  He is a healthy bed of granulation tissue  The wound dimensions are 3 mm wide by 12 mm long by 4 mm deep  Topical antibiotics reapplied to the wound and a Band-Aid used to cover the incision  I look for seeing the patient back in 3 months time at which point we will make a definitive decision regarding his necessity to excise the cicatrix to reduce the risk for recurrent abscess formation in the future  Subjective:      Patient ID: Amna Betancourt is a 77 y o  male  Patient came in today for a 2 wk f/u s/p I&D 04/04/2022 R hip  Patient states his wound is almost closed, and it has very little drainage  Patient has no complaints of any fevers/chills or any pain  Corrie ORTIZ MA      The following portions of the patient's history were reviewed and updated as appropriate: allergies, current medications, past family history, past medical history, past social history, past surgical history and problem list     Review of Systems   Constitutional: Negative for chills and fever  HENT: Negative for ear pain and sore throat  Eyes: Negative for pain and visual disturbance  Respiratory: Negative for cough and shortness of breath  Cardiovascular: Negative for chest pain and palpitations  Gastrointestinal: Negative for abdominal pain and vomiting  Genitourinary: Negative for dysuria and hematuria  Musculoskeletal: Negative for arthralgias and back pain  Skin: Negative for color change and rash  Neurological: Negative for seizures and syncope  All other systems reviewed and are negative  Objective:      Temp 98 1 °F (36 7 °C) (Temporal)          Physical Exam  Vitals and nursing note reviewed  Constitutional:       Appearance: He is well-developed  HENT:      Head: Normocephalic and atraumatic  Eyes:      Conjunctiva/sclera: Conjunctivae normal       Pupils: Pupils are equal, round, and reactive to light  Cardiovascular:      Rate and Rhythm: Normal rate and regular rhythm  Pulmonary:      Effort: Pulmonary effort is normal       Breath sounds: Normal breath sounds  Abdominal:      General: Bowel sounds are normal       Palpations: Abdomen is soft  Musculoskeletal:         General: Normal range of motion  Cervical back: Normal range of motion and neck supple  Skin:     General: Skin is warm and dry  Comments: Skin exam as described above   Neurological:      Mental Status: He is alert and oriented to person, place, and time  Psychiatric:         Behavior: Behavior normal          Thought Content:  Thought content normal          Judgment: Judgment normal

## 2022-04-25 NOTE — ASSESSMENT & PLAN NOTE
Patient returns for routine scheduled follow-up status post I&D of the right hip abscess  Patient voiced no new complaints  Continues to performed daily wound care with the assistance of his wife who is applying a topical antibiotic and a Band-Aid to the wound  On physical examination he is well-appearing male  He is in no acute distress  Awake alert oriented  Pleasant competent reliable as a historian  The wound is clean and granulating  He is a healthy bed of granulation tissue  The wound dimensions are 3 mm wide by 12 mm long by 4 mm deep  Topical antibiotics reapplied to the wound and a Band-Aid used to cover the incision  I look for seeing the patient back in 3 months time at which point we will make a definitive decision regarding his necessity to excise the cicatrix to reduce the risk for recurrent abscess formation in the future

## 2022-04-25 NOTE — LETTER
April 25, 2022     Wilfredo Flor DO  [address]    Patient: Binta Ann   YOB: 1955   Date of Visit: 4/25/2022       Dear Dr Lilliana Sidhu: Thank you for referring Sourav Salinas to me for evaluation  Below are my notes for this consultation  If you have questions, please do not hesitate to call me  I look forward to following your patient along with you  Sincerely,        Antonio Albert MD        CC: No Recipients  Antonio Albert MD  4/25/2022 10:11 AM  Sign when Signing Visit  Assessment/Plan:    Abscess of skin of right hip  Patient returns for routine scheduled follow-up status post I&D of the right hip abscess  Patient voiced no new complaints  Continues to performed daily wound care with the assistance of his wife who is applying a topical antibiotic and a Band-Aid to the wound  On physical examination he is well-appearing male  He is in no acute distress  Awake alert oriented  Pleasant competent reliable as a historian  The wound is clean and granulating  He is a healthy bed of granulation tissue  The wound dimensions are 3 mm wide by 12 mm long by 4 mm deep  Topical antibiotics reapplied to the wound and a Band-Aid used to cover the incision  I look for seeing the patient back in 3 months time at which point we will make a definitive decision regarding his necessity to excise the cicatrix to reduce the risk for recurrent abscess formation in the future  Subjective:      Patient ID: Binta Ann is a 77 y o  male  Patient came in today for a 2 wk f/u s/p I&D 04/04/2022 R hip  Patient states his wound is almost closed, and it has very little drainage  Patient has no complaints of any fevers/chills or any pain  Corrie ORTIZ MA      The following portions of the patient's history were reviewed and updated as appropriate: allergies, current medications, past family history, past medical history, past social history, past surgical history and problem list     Review of Systems   Constitutional: Negative for chills and fever  HENT: Negative for ear pain and sore throat  Eyes: Negative for pain and visual disturbance  Respiratory: Negative for cough and shortness of breath  Cardiovascular: Negative for chest pain and palpitations  Gastrointestinal: Negative for abdominal pain and vomiting  Genitourinary: Negative for dysuria and hematuria  Musculoskeletal: Negative for arthralgias and back pain  Skin: Negative for color change and rash  Neurological: Negative for seizures and syncope  All other systems reviewed and are negative  Objective:      Temp 98 1 °F (36 7 °C) (Temporal)          Physical Exam  Vitals and nursing note reviewed  Constitutional:       Appearance: He is well-developed  HENT:      Head: Normocephalic and atraumatic  Eyes:      Conjunctiva/sclera: Conjunctivae normal       Pupils: Pupils are equal, round, and reactive to light  Cardiovascular:      Rate and Rhythm: Normal rate and regular rhythm  Pulmonary:      Effort: Pulmonary effort is normal       Breath sounds: Normal breath sounds  Abdominal:      General: Bowel sounds are normal       Palpations: Abdomen is soft  Musculoskeletal:         General: Normal range of motion  Cervical back: Normal range of motion and neck supple  Skin:     General: Skin is warm and dry  Comments: Skin exam as described above   Neurological:      Mental Status: He is alert and oriented to person, place, and time  Psychiatric:         Behavior: Behavior normal          Thought Content:  Thought content normal          Judgment: Judgment normal

## 2022-06-07 ENCOUNTER — APPOINTMENT (OUTPATIENT)
Dept: LAB | Facility: CLINIC | Age: 67
End: 2022-06-07
Payer: MEDICARE

## 2022-06-07 DIAGNOSIS — D50.8 IRON DEFICIENCY ANEMIA SECONDARY TO INADEQUATE DIETARY IRON INTAKE: ICD-10-CM

## 2022-06-07 DIAGNOSIS — E03.9 ACQUIRED HYPOTHYROIDISM: ICD-10-CM

## 2022-06-07 DIAGNOSIS — Z12.5 SCREENING FOR PROSTATE CANCER: ICD-10-CM

## 2022-06-07 LAB
FERRITIN SERPL-MCNC: 269 NG/ML (ref 8–388)
IRON SATN MFR SERPL: 24 % (ref 20–50)
IRON SERPL-MCNC: 73 UG/DL (ref 65–175)
PSA SERPL-MCNC: 0.9 NG/ML (ref 0–4)
TIBC SERPL-MCNC: 309 UG/DL (ref 250–450)
TSH SERPL DL<=0.05 MIU/L-ACNC: 1.64 UIU/ML (ref 0.45–4.5)

## 2022-06-07 PROCEDURE — 84443 ASSAY THYROID STIM HORMONE: CPT

## 2022-06-07 PROCEDURE — 36415 COLL VENOUS BLD VENIPUNCTURE: CPT

## 2022-06-07 PROCEDURE — 83540 ASSAY OF IRON: CPT

## 2022-06-07 PROCEDURE — 83550 IRON BINDING TEST: CPT

## 2022-06-07 PROCEDURE — G0103 PSA SCREENING: HCPCS

## 2022-06-07 PROCEDURE — 82728 ASSAY OF FERRITIN: CPT

## 2022-06-09 ENCOUNTER — APPOINTMENT (OUTPATIENT)
Dept: RADIOLOGY | Facility: CLINIC | Age: 67
End: 2022-06-09
Payer: MEDICARE

## 2022-06-09 ENCOUNTER — OFFICE VISIT (OUTPATIENT)
Dept: INTERNAL MEDICINE CLINIC | Facility: CLINIC | Age: 67
End: 2022-06-09
Payer: MEDICARE

## 2022-06-09 VITALS
HEIGHT: 68 IN | OXYGEN SATURATION: 97 % | TEMPERATURE: 97.4 F | HEART RATE: 62 BPM | WEIGHT: 176.8 LBS | DIASTOLIC BLOOD PRESSURE: 78 MMHG | SYSTOLIC BLOOD PRESSURE: 115 MMHG | BODY MASS INDEX: 26.8 KG/M2
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
## 2022-06-09 DIAGNOSIS — M79.605 ACUTE PAIN OF LEFT LOWER EXTREMITY: ICD-10-CM

## 2022-06-09 DIAGNOSIS — C79.51 BONE METASTASIS (HCC): Primary | ICD-10-CM
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
## 2022-06-09 PROCEDURE — 73590 X-RAY EXAM OF LOWER LEG: CPT

## 2022-06-09 PROCEDURE — 73610 X-RAY EXAM OF ANKLE: CPT

## 2022-06-09 PROCEDURE — 99214 OFFICE O/P EST MOD 30 MIN: CPT | Performed by: NURSE PRACTITIONER

## 2022-06-09 NOTE — ASSESSMENT & PLAN NOTE
· Occurred 5/31/2022  · Stepped onto small stoop to get up onto his deck and when he stepped onto the stoop, it tipped over and he fell down  · Not too swollen  · Bruising on heel only  · Has wound on left shin  · Will obtain xray of shin and ankle

## 2022-06-09 NOTE — ASSESSMENT & PLAN NOTE
· 6/9/2022  · Left ankle/foot with edema, ecchymosis   · Occurred 5/31/2022  · Stepped onto small stoop to get up onto his deck and when he stepped onto the stoop, it tipped over and he fell down  · Not too swollen  · Bruising on heel only  · Has wound on left shin  · Will obtain xray of shin and ankle

## 2022-06-09 NOTE — PROGRESS NOTES
Assessment/Plan:     Problem List Items Addressed This Visit        Other    Fall at home, initial encounter - Primary     Occurred 5/31/2022  Stepped onto small stoop to get up onto his deck and when he stepped onto the stoop, it tipped over and he fell down  Not too swollen  Bruising on heel only  Has wound on left shin  Will obtain xray of shin and ankle  Relevant Orders    XR femur 1 vw left    XR ankle 3+ vw left    Acute pain of left lower extremity     6/9/2022  Left ankle/foot with edema, ecchymosis   Occurred 5/31/2022  Stepped onto small stoop to get up onto his deck and when he stepped onto the stoop, it tipped over and he fell down  Not too swollen  Bruising on heel only  Has wound on left shin  Will obtain xray of shin and ankle  Relevant Orders    XR femur 1 vw left    XR ankle 3+ vw left          Subjective:      Patient ID: Jorden Melton is a 77 y o  male  Occurred 5/31/2022, the patient fell at home  Stepped onto small stoop to get up onto his deck and when he stepped onto the stoop, it tipped over and he fell down  Not too swollen  Bruising on heel only  Has wound on left shin  Will obtain xray of shin and ankle  The following portions of the patient's history were reviewed and updated as appropriate:     Past Medical History:  He has a past medical history of Abnormal eye finding, Anemia, Atrial fibrillation ([address] ), Generalized anxiety disorder, History of kidney cancer, Hyperlipidemia, and Hypertension  ,  _______________________________________________________________________  Medical Problems:  does not have any pertinent problems on file ,  _______________________________________________________________________  Past Surgical History:   has a past surgical history that includes External ear surgery; Kidney surgery; Tibia fracture surgery; Colonoscopy (N/A, 3/10/2017);  Tonsillectomy (childhood); and Tooth extraction (08/11/2021)  ,  _______________________________________________________________________  Family History:  family history includes Cancer in his paternal uncle; No Known Problems in his mother ,  _______________________________________________________________________  Social History:   reports that he quit smoking about 23 years ago  His smoking use included cigarettes  He has never used smokeless tobacco  He reports that he does not drink alcohol and does not use drugs  ,  _______________________________________________________________________  Allergies:  is allergic to prednisone     _______________________________________________________________________  Current Outpatient Medications   Medication Sig Dispense Refill    Ascorbic Acid (vitamin C) 100 MG tablet Take 100 mg by mouth daily      aspirin 81 mg chewable tablet Chew 1 tablet (81 mg total) daily 30 tablet 0    Bevacizumab (AVASTIN IV) Infuse into a venous catheter Every 4 weeks       sertraline (ZOLOFT) 50 mg tablet TAKE 1 TABLET DAILY 90 tablet 3    atorvastatin (LIPITOR) 40 mg tablet Take 1 tablet (40 mg total) by mouth every evening (Patient not taking: No sig reported) 30 tablet 0    clopidogrel (Plavix) 75 mg tablet Take 1 tablet (75 mg total) by mouth daily (Patient not taking: Reported on 6/9/2022) 20 tablet 0    montelukast (SINGULAIR) 10 mg tablet  (Patient not taking: No sig reported)       No current facility-administered medications for this visit      _______________________________________________________________________      Review of Systems   Musculoskeletal: Positive for arthralgias, gait problem, joint swelling and myalgias  Objective:  Vitals:    06/09/22 1049   BP: 115/78   BP Location: Left arm   Patient Position: Sitting   Cuff Size: Standard   Pulse: 62   Temp: (!) 97 4 °F (36 3 °C)   SpO2: 97%   Weight: 80 2 kg (176 lb 12 8 oz)   Height: 5' 8" (1 727 m)     Body mass index is 26 88 kg/m²       Physical Exam  Musculoskeletal:        Legs:         Feet:

## 2022-06-20 ENCOUNTER — HOSPITAL ENCOUNTER (OUTPATIENT)
Dept: MRI IMAGING | Facility: HOSPITAL | Age: 67
Discharge: HOME | End: 2022-06-20
Payer: MEDICARE
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
## 2022-06-20 DIAGNOSIS — M79.605 ACUTE PAIN OF LEFT LOWER EXTREMITY: ICD-10-CM
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
## 2022-06-20 PROCEDURE — 73718 MRI LOWER EXTREMITY W/O DYE: CPT

## 2022-06-20 PROCEDURE — G1004 CDSM NDSC: HCPCS

## 2022-06-21 ENCOUNTER — TELEPHONE (OUTPATIENT)
Dept: INTERNAL MEDICINE CLINIC | Facility: CLINIC | Age: 67
End: 2022-06-21

## 2022-08-01 ENCOUNTER — OFFICE VISIT (OUTPATIENT)
Dept: SURGERY | Facility: CLINIC | Age: 67
End: 2022-08-01
Payer: MEDICARE

## 2022-08-01 VITALS
RESPIRATION RATE: 16 BRPM | TEMPERATURE: 97.3 F | WEIGHT: 176 LBS | DIASTOLIC BLOOD PRESSURE: 50 MMHG | OXYGEN SATURATION: 98 % | HEIGHT: 68 IN | BODY MASS INDEX: 26.67 KG/M2 | SYSTOLIC BLOOD PRESSURE: 100 MMHG | HEART RATE: 67 BPM

## 2022-08-01 DIAGNOSIS — L02.415 ABSCESS OF SKIN OF RIGHT HIP: Primary | ICD-10-CM

## 2022-08-01 PROCEDURE — 99213 OFFICE O/P EST LOW 20 MIN: CPT | Performed by: SURGERY

## 2022-08-01 NOTE — ASSESSMENT & PLAN NOTE
Patient returns to the office for routine skin scheduled 3 month follow-up to reassess the right hip wound and the necessity for additional surgical interventions  Today patient voiced no complaints referable to the wound  He reports that it is asymptomatic to him  On physical examination he has a well healed scar on the posterolateral right hip proximally 3 cm in length  It is a smooth flat scar without signs of residual underlying dermal inflammation  No palpable cyst is appreciated  I believe the patient has resolved his right hip soft tissue infection and no additional surgical interventions are indicated at the present time  I did offer him follow-up in 3-6 months or on an as-needed basis  Patient prefers to follow up on an as-needed basis which I believe to be safe and reasonable  He has been encouraged to follow up with the practice at any point the future with questions or concerns

## 2022-08-01 NOTE — LETTER
August 1, 2022     Swathi Rosales, DO  [address]    Patient: Lindsey Fonseca   YOB: 1955   Date of Visit: 8/1/2022       Dear Dr Mikayal Sanderson: Thank you for referring Chiquirosalie Rosenberg to me for evaluation  Below are my notes for this consultation  If you have questions, please do not hesitate to call me  I look forward to following your patient along with you  Sincerely,        Sam Castañeda MD        CC: No Recipients  Sam Castañeda MD  8/1/2022  9:40 AM  Incomplete  Assessment/Plan:    Abscess of skin of right hip  Patient returns to the office for routine skin scheduled 3 month follow-up to reassess the right hip wound and the necessity for additional surgical interventions  Today patient voiced no complaints referable to the wound  He reports that it is asymptomatic to him  On physical examination he has a well healed scar on the posterolateral right hip proximally 3 cm in length  It is a smooth flat scar without signs of residual underlying dermal inflammation  No palpable cyst is appreciated  I believe the patient has resolved his right hip soft tissue infection and no additional surgical interventions are indicated at the present time  I did offer him follow-up in 3-6 months or on an as-needed basis  Patient prefers to follow up on an as-needed basis which I believe to be safe and reasonable  He has been encouraged to follow up with the practice at any point the future with questions or concerns  Subjective:      Patient ID: Lindsey Fonseca is a 77 y o  male  Patient came in today for a 3 mo f/u s/p I&D 04/04/2022 R hip  Patient states the abscess is still there but its healing  Theres no drainage, no change in size, and he suffers no pain  No fevers or chills Corrie ORTIZ MA      The following portions of the patient's history were reviewed and updated as appropriate: allergies, current medications, past family history, past medical history, past social history, past surgical history and problem list     Review of Systems   Constitutional: Negative for chills and fever  HENT: Negative for ear pain and sore throat  Eyes: Negative for pain and visual disturbance  Respiratory: Negative for cough and shortness of breath  Cardiovascular: Negative for chest pain and palpitations  Gastrointestinal: Negative for abdominal pain and vomiting  Genitourinary: Negative for dysuria and hematuria  Musculoskeletal: Negative for arthralgias and back pain  Skin: Negative for color change and rash  Neurological: Negative for seizures and syncope  All other systems reviewed and are negative  Objective:      /50 (BP Location: Left arm, Patient Position: Sitting, Cuff Size: Large)   Pulse 67   Temp (!) 97 3 °F (36 3 °C) (Temporal)   Resp 16   Ht 5' 8" (1 727 m)   Wt 79 8 kg (176 lb)   SpO2 98%   BMI 26 76 kg/m²          Physical Exam  Vitals and nursing note reviewed  Constitutional:       Appearance: He is well-developed  HENT:      Head: Normocephalic and atraumatic  Eyes:      Conjunctiva/sclera: Conjunctivae normal       Pupils: Pupils are equal, round, and reactive to light  Cardiovascular:      Rate and Rhythm: Normal rate and regular rhythm  Pulmonary:      Effort: Pulmonary effort is normal       Breath sounds: Normal breath sounds  Abdominal:      General: Bowel sounds are normal       Palpations: Abdomen is soft  Musculoskeletal:         General: Normal range of motion  Cervical back: Normal range of motion and neck supple  Skin:     General: Skin is warm and dry  Comments: Right hip wound clean dry intact  No signs of residual inflammation  Neurological:      Mental Status: He is alert and oriented to person, place, and time  Psychiatric:         Behavior: Behavior normal          Thought Content:  Thought content normal          Judgment: Judgment normal

## 2022-08-01 NOTE — PROGRESS NOTES
Assessment/Plan:    Abscess of skin of right hip  Patient returns to the office for routine skin scheduled 3 month follow-up to reassess the right hip wound and the necessity for additional surgical interventions  Today patient voiced no complaints referable to the wound  He reports that it is asymptomatic to him  On physical examination he has a well healed scar on the posterolateral right hip proximally 3 cm in length  It is a smooth flat scar without signs of residual underlying dermal inflammation  No palpable cyst is appreciated  I believe the patient has resolved his right hip soft tissue infection and no additional surgical interventions are indicated at the present time  I did offer him follow-up in 3-6 months or on an as-needed basis  Patient prefers to follow up on an as-needed basis which I believe to be safe and reasonable  He has been encouraged to follow up with the practice at any point the future with questions or concerns  Subjective:      Patient ID: Liban Champagne is a 77 y o  male  Patient came in today for a 3 mo f/u s/p I&D 04/04/2022 R hip  Patient states the abscess is still there but its healing  Theres no drainage, no change in size, and he suffers no pain  No fevers or chills Corrie ORTIZ MA      The following portions of the patient's history were reviewed and updated as appropriate: allergies, current medications, past family history, past medical history, past social history, past surgical history and problem list     Review of Systems   Constitutional: Negative for chills and fever  HENT: Negative for ear pain and sore throat  Eyes: Negative for pain and visual disturbance  Respiratory: Negative for cough and shortness of breath  Cardiovascular: Negative for chest pain and palpitations  Gastrointestinal: Negative for abdominal pain and vomiting  Genitourinary: Negative for dysuria and hematuria     Musculoskeletal: Negative for arthralgias and back pain    Skin: Negative for color change and rash  Neurological: Negative for seizures and syncope  All other systems reviewed and are negative  Objective:      /50 (BP Location: Left arm, Patient Position: Sitting, Cuff Size: Large)   Pulse 67   Temp (!) 97 3 °F (36 3 °C) (Temporal)   Resp 16   Ht 5' 8" (1 727 m)   Wt 79 8 kg (176 lb)   SpO2 98%   BMI 26 76 kg/m²          Physical Exam  Vitals and nursing note reviewed  Constitutional:       Appearance: He is well-developed  HENT:      Head: Normocephalic and atraumatic  Eyes:      Conjunctiva/sclera: Conjunctivae normal       Pupils: Pupils are equal, round, and reactive to light  Cardiovascular:      Rate and Rhythm: Normal rate and regular rhythm  Pulmonary:      Effort: Pulmonary effort is normal       Breath sounds: Normal breath sounds  Abdominal:      General: Bowel sounds are normal       Palpations: Abdomen is soft  Musculoskeletal:         General: Normal range of motion  Cervical back: Normal range of motion and neck supple  Skin:     General: Skin is warm and dry  Comments: Right hip wound clean dry intact  No signs of residual inflammation  Neurological:      Mental Status: He is alert and oriented to person, place, and time  Psychiatric:         Behavior: Behavior normal          Thought Content:  Thought content normal          Judgment: Judgment normal

## 2022-08-17 ENCOUNTER — TELEPHONE (OUTPATIENT)
Dept: GASTROENTEROLOGY | Facility: CLINIC | Age: 67
End: 2022-08-17

## 2022-08-17 ENCOUNTER — CONSULT (OUTPATIENT)
Dept: GASTROENTEROLOGY | Facility: CLINIC | Age: 67
End: 2022-08-17
Payer: MEDICARE

## 2022-08-17 VITALS
HEIGHT: 68 IN | HEART RATE: 68 BPM | BODY MASS INDEX: 26.67 KG/M2 | OXYGEN SATURATION: 99 % | DIASTOLIC BLOOD PRESSURE: 68 MMHG | SYSTOLIC BLOOD PRESSURE: 118 MMHG | WEIGHT: 176 LBS

## 2022-08-17 DIAGNOSIS — Z12.11 SCREEN FOR COLON CANCER: ICD-10-CM

## 2022-08-17 PROCEDURE — 99204 OFFICE O/P NEW MOD 45 MIN: CPT | Performed by: INTERNAL MEDICINE

## 2022-08-17 NOTE — PROGRESS NOTES
[facility] - Outpatient Consultation  Luis E Crocker 77 y o  male MRN: [id number]  Encounter: [id number]          ASSESSMENT AND PLAN:      1  Screen for colon cancer  -     Ambulatory referral for colonoscopy    This is a 28-year-old white male with a history of colon polyps in the past   His last colonoscopy five years ago was normal with no polyps seen  He was recommended to have another colonoscopy in five years because of his previous history of polyps  Current recommendations recommend repeating a colonoscopy in 10 years after a normal colonoscopy with a history of prior polyps however, if a prior recommendation for five years was made the patient has the option of doing a colonoscopy after five years or can wait till 10 years  This option was discussed with the patient and he prefers to do the colonoscopy now  Risks of perforation and bleeding were discussed with the patient and he is agreeable to proceed with the procedure  Thank you so much for referring this patient     ______________________________________________________________________    HPI:  Anel Velasquez is a 28-year-old white male with a history of colon polyps in the past   His last colonoscopy five years ago was normal   He denies any rectal bleeding, constipation, diarrhea or abdominal pain  There is no family history of colon cancer  Recommendations on his last colonoscopy was to repeat in five years  REVIEW OF SYSTEMS:    CONSTITUTIONAL: Denies any fever, chills, rigors, and weight loss  HEENT: No earache or tinnitus  Denies hearing loss or visual disturbances  CARDIOVASCULAR: No chest pain or palpitations  RESPIRATORY: Denies any cough, hemoptysis, shortness of breath or dyspnea on exertion  GASTROINTESTINAL: As noted in the History of Present Illness  GENITOURINARY: No problems with urination  Denies any hematuria or dysuria  NEUROLOGIC: No dizziness or vertigo, denies headaches     MUSCULOSKELETAL: Denies any muscle or joint pain  SKIN: Denies skin rashes or itching  ENDOCRINE: Denies excessive thirst  Denies intolerance to heat or cold  PSYCHOSOCIAL: Denies depression or anxiety  Denies any recent memory loss  Historical Information   Past Medical History:   Diagnosis Date    Abnormal eye finding     last assessed 2015    Anemia     Atrial fibrillation (HCC)     patient denies    Generalized anxiety disorder     History of kidney cancer     Hyperlipidemia     no longer    Hypertension     no longer     Past Surgical History:   Procedure Laterality Date    COLONOSCOPY N/A 3/10/2017    Procedure: COLONOSCOPY;  Surgeon: Jovani Joseph MD;  Location: [facility];  Service:    Sherri Godwinaver EXTERNAL EAR SURGERY      KIDNEY SURGERY      TIBIA FRACTURE SURGERY      TONSILLECTOMY  childhood    TOOTH EXTRACTION  2021     Social History   Social History     Substance and Sexual Activity   Alcohol Use No     Social History     Substance and Sexual Activity   Drug Use Never     Social History     Tobacco Use   Smoking Status Former Smoker    Types: Cigarettes    Quit date:     Years since quittin 6   Smokeless Tobacco Never Used   Tobacco Comment    2 cigarettes     Family History   Problem Relation Age of Onset    No Known Problems Mother     Cancer Paternal Uncle        Meds/Allergies       Current Outpatient Medications:     Ascorbic Acid (vitamin C) 100 MG tablet    aspirin 81 mg chewable tablet    Bevacizumab (AVASTIN IV)    sertraline (ZOLOFT) 50 mg tablet    atorvastatin (LIPITOR) 40 mg tablet    clopidogrel (Plavix) 75 mg tablet    montelukast (SINGULAIR) 10 mg tablet    Allergies   Allergen Reactions    Prednisone GI Intolerance           Objective     Blood pressure 118/68, pulse 68, height 5' 8" (1 727 m), weight 79 8 kg (176 lb), SpO2 99 %  Body mass index is 26 76 kg/m²          PHYSICAL EXAM:      General Appearance:   Alert, cooperative, no distress   HEENT: Normocephalic, atraumatic, anicteric  Neck:  Supple, symmetrical, trachea midline   Lungs:   Clear to auscultation bilaterally; no rales, rhonchi or wheezing; respirations unlabored    Heart[de-identified]   Regular rate and rhythm; no murmur, rub, or gallop  Abdomen:   Soft, non-tender, non-distended; normal bowel sounds; no masses, no organomegaly    Genitalia:   Deferred    Rectal:   Deferred    Extremities:  No cyanosis, clubbing or edema    Pulses:  2+ and symmetric    Skin:  No jaundice, rashes, or lesions    Lymph nodes:  No palpable cervical lymphadenopathy        Lab Results:   No visits with results within 1 Day(s) from this visit  Latest known visit with results is:   Appointment on 06/07/2022   Component Date Value    TSH 3RD GENERATON 06/07/2022 1 640     PSA 06/07/2022 0 9     Iron Saturation 06/07/2022 24     TIBC 06/07/2022 309     Iron 06/07/2022 73     Ferritin 06/07/2022 269          Radiology Results:   No results found

## 2022-08-17 NOTE — TELEPHONE ENCOUNTER
Scheduled date of colonoscopy (as of today):9/14/22  Physician performing colonoscopy:Hector  Location of colonoscopy:[facility]  Bowel prep reviewed with patient:Miralax  Instructions reviewed with patient by:Meghan  Clearances: none

## 2022-08-22 ENCOUNTER — OFFICE VISIT (OUTPATIENT)
Dept: INTERNAL MEDICINE CLINIC | Facility: CLINIC | Age: 67
End: 2022-08-22
Payer: MEDICARE

## 2022-08-22 VITALS
HEIGHT: 68 IN | BODY MASS INDEX: 26.39 KG/M2 | TEMPERATURE: 97.5 F | SYSTOLIC BLOOD PRESSURE: 116 MMHG | WEIGHT: 174.13 LBS | HEART RATE: 63 BPM | OXYGEN SATURATION: 97 % | DIASTOLIC BLOOD PRESSURE: 78 MMHG

## 2022-08-22 DIAGNOSIS — E03.9 ACQUIRED HYPOTHYROIDISM: ICD-10-CM

## 2022-08-22 DIAGNOSIS — D50.8 IRON DEFICIENCY ANEMIA SECONDARY TO INADEQUATE DIETARY IRON INTAKE: ICD-10-CM

## 2022-08-22 DIAGNOSIS — I48.0 PAROXYSMAL ATRIAL FIBRILLATION (HCC): ICD-10-CM

## 2022-08-22 DIAGNOSIS — H35.81 MACULAR EDEMA: ICD-10-CM

## 2022-08-22 DIAGNOSIS — F41.1 GENERALIZED ANXIETY DISORDER: ICD-10-CM

## 2022-08-22 DIAGNOSIS — Z12.11 SCREEN FOR COLON CANCER: ICD-10-CM

## 2022-08-22 DIAGNOSIS — C64.9 MALIGNANT NEOPLASM OF KIDNEY, UNSPECIFIED LATERALITY (HCC): ICD-10-CM

## 2022-08-22 DIAGNOSIS — Z23 ENCOUNTER FOR VACCINATION: ICD-10-CM

## 2022-08-22 DIAGNOSIS — E78.2 MIXED HYPERLIPIDEMIA: ICD-10-CM

## 2022-08-22 DIAGNOSIS — M15.9 GENERALIZED OSTEOARTHRITIS: ICD-10-CM

## 2022-08-22 DIAGNOSIS — I10 BENIGN ESSENTIAL HYPERTENSION: Primary | ICD-10-CM

## 2022-08-22 PROBLEM — M79.605 ACUTE PAIN OF LEFT LOWER EXTREMITY: Status: RESOLVED | Noted: 2022-06-09 | Resolved: 2022-08-22
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
## 2022-08-22 PROBLEM — Z86.73 HISTORY OF TIA (TRANSIENT ISCHEMIC ATTACK): Status: ACTIVE | Noted: 2022-01-10
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
## 2022-08-22 PROCEDURE — 99214 OFFICE O/P EST MOD 30 MIN: CPT | Performed by: INTERNAL MEDICINE

## 2022-08-22 NOTE — PROGRESS NOTES
Assessment/Plan:  Problem List Items Addressed This Visit        Endocrine    Hypothyroidism       Cardiovascular and Mediastinum    Paroxysmal atrial fibrillation (HCC)    Benign essential hypertension - Primary       Musculoskeletal and Integument    Generalized osteoarthritis       Genitourinary    Malignant neoplasm of kidney (HCC)       Other    Macular edema    Iron deficiency anemia secondary to inadequate dietary iron intake    Hyperlipidemia    Generalized anxiety disorder      Other Visit Diagnoses     Encounter for vaccination        Screen for colon cancer               Diagnoses and all orders for this visit:    Benign essential hypertension    Paroxysmal atrial fibrillation (HCC)    Acquired hypothyroidism    Generalized osteoarthritis    Malignant neoplasm of kidney, unspecified laterality ([facility] [address] )    Iron deficiency anemia secondary to inadequate dietary iron intake    Mixed hyperlipidemia    Generalized anxiety disorder    Macular edema    Encounter for vaccination    Screen for colon cancer      No problem-specific Assessment & Plan notes found for this encounter  A/P: Doing well and labs are up to date  Appreciate specialists input  Discussed vaccines defers the pneumonia vaccines  Has and appt for CRC  Continue current treatment and RTC four months for routine  Subjective:      Patient ID: Nick Arnett is a 77 y o  male  WM RTC for f/u HTN, hypothyroidism, etc  Doing well and no new issues  Remains active w/o difficulty and no falls  Denies CP, SOB, palpitations, orthopnea, edema, or PND  Vision no worse and continues with injections via the specialist  No stroke like events  Renal cancer in remission with recent CT acceptable   EMBER is controlled  Labs are up to date  Due of vaccines and CRC         The following portions of the patient's history were reviewed and updated as appropriate:   He has a past medical history of Abnormal eye finding, Anemia, Atrial fibrillation ([facility] [address] ), Generalized anxiety disorder, History of kidney cancer, Hyperlipidemia, and Hypertension  ,  does not have any pertinent problems on file  ,   has a past surgical history that includes External ear surgery; Kidney surgery; Tibia fracture surgery; Colonoscopy (N/A, 3/10/2017); Tonsillectomy (childhood); and Tooth extraction (08/11/2021)  ,  family history includes Cancer in his paternal uncle; No Known Problems in his mother  ,   reports that he quit smoking about 23 years ago  His smoking use included cigarettes  He has never used smokeless tobacco  He reports that he does not drink alcohol and does not use drugs  ,  is allergic to prednisone     Current Outpatient Medications   Medication Sig Dispense Refill    Ascorbic Acid (vitamin C) 100 MG tablet Take 100 mg by mouth daily      aspirin 81 mg chewable tablet Chew 1 tablet (81 mg total) daily 30 tablet 0    Bevacizumab (AVASTIN IV) Infuse into a venous catheter Every 4 weeks       sertraline (ZOLOFT) 50 mg tablet TAKE 1 TABLET DAILY 90 tablet 3    atorvastatin (LIPITOR) 40 mg tablet Take 1 tablet (40 mg total) by mouth every evening (Patient not taking: No sig reported) 30 tablet 0    clopidogrel (Plavix) 75 mg tablet Take 1 tablet (75 mg total) by mouth daily (Patient not taking: No sig reported) 20 tablet 0    montelukast (SINGULAIR) 10 mg tablet  (Patient not taking: No sig reported)       No current facility-administered medications for this visit  Review of Systems   Constitutional: Negative for activity change, chills, diaphoresis, fatigue and fever  HENT: Negative  Eyes: Positive for visual disturbance  Respiratory: Negative for cough, chest tightness, shortness of breath and wheezing  Cardiovascular: Negative for chest pain, palpitations and leg swelling  Gastrointestinal: Negative for abdominal pain, constipation, diarrhea, nausea and vomiting  Endocrine: Negative for cold intolerance and heat intolerance     Genitourinary: Negative for difficulty urinating, dysuria and frequency  Musculoskeletal: Negative for arthralgias, gait problem and myalgias  Neurological: Negative for dizziness, seizures, syncope, weakness, light-headedness and headaches  Psychiatric/Behavioral: Negative for confusion, dysphoric mood and sleep disturbance  The patient is not nervous/anxious  PHQ-2/9 Depression Screening          Objective:  Vitals:    08/22/22 1043   BP: 116/78   Pulse: 63   Temp: 97 5 °F (36 4 °C)   SpO2: 97%   Weight: 79 kg (174 lb 2 oz)   Height: 5' 8" (1 727 m)     Body mass index is 26 48 kg/m²  Physical Exam  Vitals and nursing note reviewed  Constitutional:       General: He is not in acute distress  Appearance: Normal appearance  He is not ill-appearing  HENT:      Head: Normocephalic and atraumatic  Mouth/Throat:      Mouth: Mucous membranes are moist    Eyes:      Extraocular Movements: Extraocular movements intact  Conjunctiva/sclera: Conjunctivae normal       Pupils: Pupils are equal, round, and reactive to light  Neck:      Vascular: No carotid bruit  Cardiovascular:      Rate and Rhythm: Normal rate and regular rhythm  Heart sounds: Normal heart sounds  Pulmonary:      Effort: Pulmonary effort is normal  No respiratory distress  Breath sounds: Normal breath sounds  No wheezing or rales  Abdominal:      General: Bowel sounds are normal  There is no distension  Palpations: Abdomen is soft  Tenderness: There is no abdominal tenderness  Musculoskeletal:      Cervical back: Neck supple  Right lower leg: No edema  Left lower leg: No edema  Neurological:      General: No focal deficit present  Mental Status: He is alert and oriented to person, place, and time  Mental status is at baseline  Psychiatric:         Mood and Affect: Mood normal          Behavior: Behavior normal          Thought Content:  Thought content normal          Judgment: Judgment normal

## 2022-08-22 NOTE — PATIENT INSTRUCTIONS
Chronic Hypertension   AMBULATORY CARE:   Hypertension  is high blood pressure  Your blood pressure is the force of your blood moving against the walls of your arteries  Hypertension causes your blood pressure to get so high that your heart has to work much harder than normal  This can damage your heart  Even if you have hypertension for years, lifestyle changes, medicines, or both can help bring your blood pressure to normal   Call your local emergency number (911 in the [address] [facility]) or have someone call if:   You have chest pain  You have any of the following signs of a heart attack:      Squeezing, pressure, or pain in your chest    You may  also have any of the following:     Discomfort or pain in your back, neck, jaw, stomach, or arm    Shortness of breath    Nausea or vomiting    Lightheadedness or a sudden cold sweat    You become confused or have difficulty speaking  You suddenly feel lightheaded or have trouble breathing  Seek care immediately if:   You have a severe headache or vision loss  You have weakness in an arm or leg  Call your doctor or cardiologist if:   You feel faint, dizzy, confused, or drowsy  You have been taking your blood pressure medicine but your pressure is higher than your provider says it should be  You have questions or concerns about your condition or care  Treatment for chronic hypertension  may include medicine to lower your blood pressure and cholesterol levels  A low cholesterol level helps prevent heart disease and makes it easier to control your blood pressure  Heart disease can make your blood pressure harder to control  You may also need to make lifestyle changes  What you need to know about the stages of hypertension:       Normal blood pressure is 119/79 or lower   Your healthcare provider may only check your blood pressure each year if it stays at a normal level  Elevated blood pressure is 120/79 to 129/79   This is sometimes called prehypertension  Your healthcare provider may suggest lifestyle changes to help lower your blood pressure to a normal level  He or she may then check it again in 3 to 6 months  Stage 1 hypertension is 130/80  to 139/89   Your provider may recommend lifestyle changes, medication, and checks every 3 to 6 months until your blood pressure is controlled  Stage 2 hypertension is 140/90 or higher   Your provider will recommend lifestyle changes and have you take 2 kinds of hypertension medicines  You will also need to have your blood pressure checked monthly until it is controlled  Manage chronic hypertension:   Check your blood pressure at home  Avoid smoking, caffeine, and exercise at least 30 minutes before checking your blood pressure  Sit and rest for 5 minutes before you take your blood pressure  Extend your arm and support it on a flat surface  Your arm should be at the same level as your heart  Follow the directions that came with your blood pressure monitor  Check your blood pressure 2 times, 1 minute apart, before you take your medicine in the morning  Also check your blood pressure before your evening meal  Keep a record of your readings and bring it to your follow-up visits  Ask your healthcare provider what your blood pressure should be  Manage any other health conditions you have  Health conditions such as diabetes can increase your risk for hypertension  Follow your healthcare provider's instructions and take all your medicines as directed  Talk to your healthcare provider about any new health conditions you have recently developed  Ask about all medicines  Certain medicines can increase your blood pressure  Examples include oral birth control pills, decongestants, herbal supplements, and NSAIDs, such as ibuprofen  Your healthcare provider can tell you which medicines are safe for you to take  This includes prescription and over-the-counter medicines      Lifestyle changes you can make to lower your blood pressure: Your provider may want you to make more lifestyle changes if you are having trouble controlling your blood pressure  This may feel difficult over time, especially if you think you are making good changes but your pressure is still high  It might help to focus on one new change at a time  For example, try to add 1 more day of exercise, or exercise for an extra 10 minutes on 2 days  Small changes can make a big difference  Your healthcare provider can also refer you to specialists such as a dietitian who can help you make small changes  Your family members may be included in helping you learn to create lifestyle changes, such as the following:     Limit sodium (salt) as directed  Too much sodium can affect your fluid balance  Check labels to find low-sodium or no-salt-added foods  Some low-sodium foods use potassium salts for flavor  Too much potassium can also cause health problems  Your healthcare provider will tell you how much sodium and potassium are safe for you to have in a day  He or she may recommend that you limit sodium to 2,300 mg a day  Follow the meal plan recommended by your healthcare provider  A dietitian or your provider can give you more information on low-sodium plans or the DASH (Dietary Approaches to Stop Hypertension) eating plan  The DASH plan is low in sodium, processed sugar, unhealthy fats, and total fat  It is high in potassium, calcium, and fiber  These can be found in vegetables, fruit, and whole-grain foods  Be physically active throughout the day  Physical activity, such as exercise, can help control your blood pressure and your weight  Be physically active for at least 30 minutes per day, on most days of the week  Include aerobic activity, such as walking or riding a bicycle  Also include strength training at least 2 times each week  Your healthcare providers can help you create a physical activity plan  Decrease stress    This may help lower your blood pressure  Learn ways to relax, such as deep breathing or listening to music  Limit alcohol as directed  Alcohol can increase your blood pressure  A drink of alcohol is 12 ounces of beer, 5 ounces of wine, or 1½ ounces of liquor  Do not smoke  Nicotine and other chemicals in cigarettes and cigars can increase your blood pressure and also cause lung damage  Ask your healthcare provider for information if you currently smoke and need help to quit  E-cigarettes or smokeless tobacco still contain nicotine  Talk to your healthcare provider before you use these products  Follow up with your doctor or cardiologist as directed: You will need to return to have your blood pressure checked and to have other lab tests done  Write down your questions so you remember to ask them during your visits  © Copyright [employer] 2022 Information is for End User's use only and may not be sold, redistributed or otherwise used for commercial purposes  All illustrations and images included in CareNotes® are the copyrighted property of A D A M , Inc  or [facility] Becca Arnold   The above information is an  only  It is not intended as medical advice for individual conditions or treatments  Talk to your doctor, nurse or pharmacist before following any medical regimen to see if it is safe and effective for you

## 2022-09-14 ENCOUNTER — ANESTHESIA (OUTPATIENT)
Dept: GASTROENTEROLOGY | Facility: HOSPITAL | Age: 67
End: 2022-09-14

## 2022-09-14 ENCOUNTER — HOSPITAL ENCOUNTER (OUTPATIENT)
Dept: GASTROENTEROLOGY | Facility: HOSPITAL | Age: 67
Setting detail: OUTPATIENT SURGERY
Discharge: HOME | End: 2022-09-14
Attending: INTERNAL MEDICINE
Payer: MEDICARE

## 2022-09-14 ENCOUNTER — ANESTHESIA EVENT (OUTPATIENT)
Dept: GASTROENTEROLOGY | Facility: HOSPITAL | Age: 67
End: 2022-09-14

## 2022-09-14 VITALS
DIASTOLIC BLOOD PRESSURE: 59 MMHG | HEIGHT: 68 IN | SYSTOLIC BLOOD PRESSURE: 105 MMHG | WEIGHT: 174 LBS | TEMPERATURE: 97 F | OXYGEN SATURATION: 98 % | BODY MASS INDEX: 26.37 KG/M2 | HEART RATE: 51 BPM | RESPIRATION RATE: 16 BRPM

## 2022-09-14 DIAGNOSIS — Z12.11 SCREEN FOR COLON CANCER: ICD-10-CM

## 2022-09-14 PROCEDURE — 45384 COLONOSCOPY W/LESION REMOVAL: CPT | Performed by: INTERNAL MEDICINE

## 2022-09-14 RX ORDER — PROPOFOL 10 MG/ML
INJECTION, EMULSION INTRAVENOUS AS NEEDED
Status: DISCONTINUED | OUTPATIENT
Start: 2022-09-14 | End: 2022-09-14

## 2022-09-14 RX ORDER — LIDOCAINE HYDROCHLORIDE 20 MG/ML
INJECTION, SOLUTION EPIDURAL; INFILTRATION; INTRACAUDAL; PERINEURAL AS NEEDED
Status: DISCONTINUED | OUTPATIENT
Start: 2022-09-14 | End: 2022-09-14

## 2022-09-14 RX ORDER — SODIUM CHLORIDE, SODIUM LACTATE, POTASSIUM CHLORIDE, CALCIUM CHLORIDE 600; 310; 30; 20 MG/100ML; MG/100ML; MG/100ML; MG/100ML
125 INJECTION, SOLUTION INTRAVENOUS CONTINUOUS
Status: DISCONTINUED | OUTPATIENT
Start: 2022-09-14 | End: 2022-09-18 | Stop reason: HOSPADM

## 2022-09-14 RX ADMIN — SODIUM CHLORIDE, SODIUM LACTATE, POTASSIUM CHLORIDE, AND CALCIUM CHLORIDE 125 ML/HR: .6; .31; .03; .02 INJECTION, SOLUTION INTRAVENOUS at 09:10

## 2022-09-14 RX ADMIN — PROPOFOL 10 MG: 10 INJECTION, EMULSION INTRAVENOUS at 10:07

## 2022-09-14 RX ADMIN — PROPOFOL 20 MG: 10 INJECTION, EMULSION INTRAVENOUS at 10:12

## 2022-09-14 RX ADMIN — PROPOFOL 20 MG: 10 INJECTION, EMULSION INTRAVENOUS at 10:14

## 2022-09-14 RX ADMIN — PROPOFOL 110 MG: 10 INJECTION, EMULSION INTRAVENOUS at 10:06

## 2022-09-14 RX ADMIN — PROPOFOL 20 MG: 10 INJECTION, EMULSION INTRAVENOUS at 10:10

## 2022-09-14 RX ADMIN — LIDOCAINE HYDROCHLORIDE 100 MG: 20 INJECTION, SOLUTION EPIDURAL; INFILTRATION; INTRACAUDAL; PERINEURAL at 10:06

## 2022-09-14 RX ADMIN — PROPOFOL 20 MG: 10 INJECTION, EMULSION INTRAVENOUS at 10:17

## 2022-09-14 NOTE — ANESTHESIA POSTPROCEDURE EVALUATION
Post-Op Assessment Note    CV Status:  Stable    Pain management: satisfactory to patient     Mental Status:  Alert and awake   Hydration Status:  Stable   PONV Controlled:  None   Airway Patency:  Patent      Post Op Vitals Reviewed: Yes      Staff: CRNA         No complications documented      /59 (09/14/22 1031)    Temp (!) 97 °F (36 1 °C) (09/14/22 1031)    Pulse (!) 51 (09/14/22 1031)   Resp 16 (09/14/22 1031)    SpO2 98 % (09/14/22 1031)

## 2022-09-14 NOTE — ANESTHESIA PREPROCEDURE EVALUATION
Procedure:  COLONOSCOPY    Relevant Problems   CARDIO   (+) Benign essential hypertension   (+) Hyperlipidemia   (+) Paroxysmal atrial fibrillation (HCC)      ENDO   (+) Hypothyroidism      /RENAL   (+) Malignant neoplasm of kidney (HCC)      HEMATOLOGY   (+) Iron deficiency anemia secondary to inadequate dietary iron intake      MUSCULOSKELETAL   (+) Degenerative disk disease   (+) Generalized osteoarthritis      NEURO/PSYCH   (+) Generalized anxiety disorder   (+) History of TIA (transient ischemic attack)        Physical Exam    Airway    Mallampati score: III  TM Distance: >3 FB  Neck ROM: full     Dental   No notable dental hx     Cardiovascular  Cardiovascular exam normal    Pulmonary  Pulmonary exam normal     Other Findings        Anesthesia Plan  ASA Score- 3     Anesthesia Type- IV sedation with anesthesia with ASA Monitors  Additional Monitors:   Airway Plan:           Plan Factors-Exercise tolerance (METS): >4 METS  Chart reviewed  Patient summary reviewed  Patient is not a current smoker  Induction- intravenous  Postoperative Plan-     Informed Consent- Anesthetic plan and risks discussed with patient

## 2022-09-14 NOTE — H&P
History and Physical - [facility]  Fe Dillon 77 y o  male MRN: [id number]                  HPI: Fe Dillon is a 77y o  year old male who presents for a history of colon polyps      REVIEW OF SYSTEMS: Per the HPI, and otherwise unremarkable      Historical Information   Past Medical History:   Diagnosis Date    Abnormal eye finding     last assessed 2015    Anemia     Atrial fibrillation (HCC)     patient denies    Colon polyp     Generalized anxiety disorder     History of kidney cancer     Hyperlipidemia     no longer    Hypertension     no longer    TIA (transient ischemic attack)      Past Surgical History:   Procedure Laterality Date    COLONOSCOPY N/A 3/10/2017    Procedure: COLONOSCOPY;  Surgeon: Rosa Burton MD;  Location: [facility];  Service:    Arden Lemme EXTERNAL EAR SURGERY      KIDNEY SURGERY      TIBIA FRACTURE SURGERY      TONSILLECTOMY  childhood    TOOTH EXTRACTION  2021     Social History   Social History     Substance and Sexual Activity   Alcohol Use No    Comment: rarely     Social History     Substance and Sexual Activity   Drug Use Never     Social History     Tobacco Use   Smoking Status Former Smoker    Types: Cigarettes    Quit date:     Years since quittin 7   Smokeless Tobacco Never Used     Family History   Problem Relation Age of Onset    No Known Problems Mother     Cancer Paternal Uncle        Meds/Allergies       Current Outpatient Medications:     Ascorbic Acid (vitamin C) 100 MG tablet    aspirin 81 mg chewable tablet    sertraline (ZOLOFT) 50 mg tablet    atorvastatin (LIPITOR) 40 mg tablet    Bevacizumab (AVASTIN IV)    clopidogrel (Plavix) 75 mg tablet    montelukast (SINGULAIR) 10 mg tablet    Current Facility-Administered Medications:     lactated ringers infusion, 125 mL/hr, Intravenous, Continuous, 125 mL/hr at 22 0910    Allergies   Allergen Reactions    Prednisone GI Intolerance       Objective /74   Pulse (!) 53   Temp (!) 96 5 °F (35 8 °C) (Temporal)   Resp 18   Ht 5' 8" (1 727 m)   Wt 78 9 kg (174 lb)   SpO2 98%   BMI 26 46 kg/m²       PHYSICAL EXAM    Gen: NAD  Head: NCAT  CV: RRR  CHEST: Clear  ABD: soft, NT/ND  EXT: no edema      ASSESSMENT/PLAN:  This is a 77y o  year old male here for colonoscopy, and he is stable and optimized for his procedure

## 2022-10-03 ENCOUNTER — IMMUNIZATIONS (OUTPATIENT)
Dept: INTERNAL MEDICINE CLINIC | Facility: CLINIC | Age: 67
End: 2022-10-03
Payer: MEDICARE

## 2022-10-03 DIAGNOSIS — Z23 ENCOUNTER FOR IMMUNIZATION: Primary | ICD-10-CM

## 2022-10-03 PROCEDURE — 90662 IIV NO PRSV INCREASED AG IM: CPT

## 2022-10-03 PROCEDURE — G0008 ADMIN INFLUENZA VIRUS VAC: HCPCS

## 2022-12-22 ENCOUNTER — OFFICE VISIT (OUTPATIENT)
Dept: INTERNAL MEDICINE CLINIC | Facility: CLINIC | Age: 67
End: 2022-12-22

## 2022-12-22 VITALS
SYSTOLIC BLOOD PRESSURE: 130 MMHG | BODY MASS INDEX: 26.56 KG/M2 | DIASTOLIC BLOOD PRESSURE: 76 MMHG | OXYGEN SATURATION: 99 % | TEMPERATURE: 97.9 F | WEIGHT: 175.25 LBS | HEART RATE: 80 BPM | HEIGHT: 68 IN

## 2022-12-22 DIAGNOSIS — R35.1 NOCTURIA: ICD-10-CM

## 2022-12-22 DIAGNOSIS — Z23 ENCOUNTER FOR VACCINATION: ICD-10-CM

## 2022-12-22 DIAGNOSIS — I48.0 PAROXYSMAL ATRIAL FIBRILLATION (HCC): ICD-10-CM

## 2022-12-22 DIAGNOSIS — D50.8 IRON DEFICIENCY ANEMIA SECONDARY TO INADEQUATE DIETARY IRON INTAKE: ICD-10-CM

## 2022-12-22 DIAGNOSIS — Z86.73 HISTORY OF TIA (TRANSIENT ISCHEMIC ATTACK): ICD-10-CM

## 2022-12-22 DIAGNOSIS — E03.9 ACQUIRED HYPOTHYROIDISM: ICD-10-CM

## 2022-12-22 DIAGNOSIS — F41.1 GENERALIZED ANXIETY DISORDER: ICD-10-CM

## 2022-12-22 DIAGNOSIS — E78.2 MIXED HYPERLIPIDEMIA: ICD-10-CM

## 2022-12-22 DIAGNOSIS — M15.9 GENERALIZED OSTEOARTHRITIS: ICD-10-CM

## 2022-12-22 DIAGNOSIS — R79.9 ABNORMAL FINDING OF BLOOD CHEMISTRY, UNSPECIFIED: ICD-10-CM

## 2022-12-22 DIAGNOSIS — I10 BENIGN ESSENTIAL HYPERTENSION: Primary | ICD-10-CM

## 2022-12-22 DIAGNOSIS — Z13.1 SCREENING FOR DIABETES MELLITUS (DM): ICD-10-CM

## 2022-12-22 DIAGNOSIS — C64.9 MALIGNANT NEOPLASM OF KIDNEY, UNSPECIFIED LATERALITY (HCC): ICD-10-CM

## 2022-12-22 NOTE — PROGRESS NOTES
Depression Screening and Follow-up Plan: Patient was screened for depression during today's encounter  They screened negative with a PHQ-2 score of 0  Assessment/Plan:  Problem List Items Addressed This Visit        Endocrine    Hypothyroidism    Relevant Orders    TSH, 3rd generation with Free T4 reflex       Cardiovascular and Mediastinum    Paroxysmal atrial fibrillation (HCC)    Relevant Orders    CBC and differential    Comprehensive metabolic panel    TSH, 3rd generation with Free T4 reflex    Benign essential hypertension - Primary    Relevant Orders    CBC and differential    Comprehensive metabolic panel    Microalbumin / creatinine urine ratio       Musculoskeletal and Integument    Generalized osteoarthritis       Genitourinary    Malignant neoplasm of kidney (HCC)       Other    Iron deficiency anemia secondary to inadequate dietary iron intake    Relevant Orders    CBC and differential    Hyperlipidemia    Relevant Orders    Comprehensive metabolic panel    LDL cholesterol, direct    Triglycerides    History of TIA (transient ischemic attack)    Generalized anxiety disorder   Other Visit Diagnoses     Screening for diabetes mellitus (DM)        Relevant Orders    Hemoglobin A1C    Encounter for vaccination        Abnormal finding of blood chemistry, unspecified        Relevant Orders    Hemoglobin A1C    Nocturia        Relevant Orders    UA w Reflex to Microscopic w Reflex to Culture           Diagnoses and all orders for this visit:    Benign essential hypertension  -     CBC and differential; Future  -     Comprehensive metabolic panel; Future  -     Microalbumin / creatinine urine ratio    Paroxysmal atrial fibrillation (HCC)  -     CBC and differential; Future  -     Comprehensive metabolic panel; Future  -     TSH, 3rd generation with Free T4 reflex; Future    Acquired hypothyroidism  -     TSH, 3rd generation with Free T4 reflex;  Future    Generalized osteoarthritis    Malignant neoplasm of kidney, unspecified laterality (HCC)    Generalized anxiety disorder    Mixed hyperlipidemia  -     Comprehensive metabolic panel; Future  -     LDL cholesterol, direct; Future  -     Triglycerides; Future    Iron deficiency anemia secondary to inadequate dietary iron intake  -     CBC and differential; Future    History of TIA (transient ischemic attack)    Screening for diabetes mellitus (DM)  -     Hemoglobin A1C; Future    Encounter for vaccination    Abnormal finding of blood chemistry, unspecified  -     Hemoglobin A1C; Future    Nocturia  -     UA w Reflex to Microscopic w Reflex to Culture      No problem-specific Assessment & Plan notes found for this encounter  A/P: Doing ok and will check labs  Long discussion about the urine and will check labs and urine  Suspect more BPH despite normal PSA than OAB given lack of s/s  Discussed meds and really doesn't want meds  Discussed trying Kegel's and fluid restrictions at night  Discussed seeing   Will consider his options and get back to me  Discussed vaccines and already had his flu vaccine  Appreciate specialists input  Continue current treatment and RTC four months for routine  Subjective:      Patient ID: Viki Dodson is a 79 y o  male  WM RTC for f/u HTN, PAF, etc  Doing ok and no new issues except for several years of nocturia  Reports getting u up three times a night  No changes during the day  No new meds or dietary changes  No burning with urination, blood, and rare urgency  Sometimes feel like incomplete emptying    Remains active w/o difficulty and no falls  No stroke like events  Vision no worse  Renal Ca in remission  EMBER is controlled  Chronic pain is manageable  Denies CP, SOB, palpitations, edema, orthopnea or PND  Due for labs and vaccines         The following portions of the patient's history were reviewed and updated as appropriate:   He has a past medical history of Abnormal eye finding, Anemia, Atrial fibrillation ([address] ), Colon polyp, Generalized anxiety disorder, History of kidney cancer, Hyperlipidemia, Hypertension, and TIA (transient ischemic attack)  ,  does not have any pertinent problems on file  ,   has a past surgical history that includes External ear surgery; Kidney surgery; Tibia fracture surgery; Colonoscopy (N/A, 3/10/2017); Tonsillectomy (childhood); and Tooth extraction (08/11/2021)  ,  family history includes Cancer in his paternal uncle; No Known Problems in his mother  ,   reports that he quit smoking about 23 years ago  His smoking use included cigarettes  He has never used smokeless tobacco  He reports that he does not drink alcohol and does not use drugs  ,  is allergic to prednisone     Current Outpatient Medications   Medication Sig Dispense Refill   • Ascorbic Acid (vitamin C) 100 MG tablet Take 100 mg by mouth daily     • aspirin 81 mg chewable tablet Chew 1 tablet (81 mg total) daily 30 tablet 0   • Bevacizumab (AVASTIN IV) Infuse into a venous catheter Every 4 weeks      • sertraline (ZOLOFT) 50 mg tablet TAKE 1 TABLET DAILY 90 tablet 3   • atorvastatin (LIPITOR) 40 mg tablet Take 1 tablet (40 mg total) by mouth every evening (Patient not taking: Reported on 3/4/2022) 30 tablet 0   • clopidogrel (Plavix) 75 mg tablet Take 1 tablet (75 mg total) by mouth daily (Patient not taking: Reported on 6/9/2022) 20 tablet 0   • montelukast (SINGULAIR) 10 mg tablet  (Patient not taking: Reported on 3/31/2022)       No current facility-administered medications for this visit  Review of Systems   Constitutional: Negative for activity change, chills, diaphoresis, fatigue and fever  HENT: Negative  Eyes: Positive for visual disturbance  Respiratory: Negative for cough, chest tightness, shortness of breath and wheezing  Cardiovascular: Negative for chest pain, palpitations and leg swelling  Gastrointestinal: Negative for abdominal pain, constipation, diarrhea, nausea and vomiting     Endocrine: Negative for cold intolerance and heat intolerance  Genitourinary: Negative for decreased urine volume, difficulty urinating, dysuria, flank pain, frequency, hematuria and urgency  Nocturia  Musculoskeletal: Negative for arthralgias, gait problem and myalgias  Neurological: Negative for dizziness, seizures, syncope, weakness, light-headedness and headaches  Psychiatric/Behavioral: Negative for confusion, dysphoric mood and sleep disturbance  The patient is not nervous/anxious  PHQ-2/9 Depression Screening    Little interest or pleasure in doing things: 0 - not at all  Feeling down, depressed, or hopeless: 0 - not at all  PHQ-2 Score: 0  PHQ-2 Interpretation: Negative depression screen        Objective:  Vitals:    12/22/22 1033   BP: 130/76   Pulse: 80   Temp: 97 9 °F (36 6 °C)   SpO2: 99%   Weight: 79 5 kg (175 lb 4 oz)   Height: 5' 8" (1 727 m)     Body mass index is 26 65 kg/m²  Physical Exam  Vitals and nursing note reviewed  Constitutional:       General: He is not in acute distress  Appearance: Normal appearance  He is not ill-appearing  HENT:      Head: Normocephalic and atraumatic  Mouth/Throat:      Mouth: Mucous membranes are moist    Eyes:      Extraocular Movements: Extraocular movements intact  Conjunctiva/sclera: Conjunctivae normal       Pupils: Pupils are equal, round, and reactive to light  Neck:      Vascular: No carotid bruit  Cardiovascular:      Rate and Rhythm: Normal rate and regular rhythm  Heart sounds: Normal heart sounds  No murmur heard  Pulmonary:      Effort: Pulmonary effort is normal  No respiratory distress  Breath sounds: Normal breath sounds  No wheezing, rhonchi or rales  Abdominal:      General: Bowel sounds are normal  There is no distension  Palpations: Abdomen is soft  Tenderness: There is no abdominal tenderness  Musculoskeletal:      Cervical back: Neck supple  Right lower leg: No edema        Left lower leg: No edema  Neurological:      General: No focal deficit present  Mental Status: He is alert and oriented to person, place, and time  Mental status is at baseline  Psychiatric:         Mood and Affect: Mood normal          Behavior: Behavior normal          Thought Content:  Thought content normal          Judgment: Judgment normal

## 2022-12-22 NOTE — PATIENT INSTRUCTIONS
Chronic Hypertension   AMBULATORY CARE:   Hypertension  is high blood pressure  Your blood pressure is the force of your blood moving against the walls of your arteries  Hypertension causes your blood pressure to get so high that your heart has to work much harder than normal  This can damage your heart  Even if you have hypertension for years, lifestyle changes, medicines, or both can help bring your blood pressure to normal   Call your local emergency number (911 in the [address] [facility]) or have someone call if:   You have chest pain  You have any of the following signs of a heart attack:      Squeezing, pressure, or pain in your chest    You may  also have any of the following:     Discomfort or pain in your back, neck, jaw, stomach, or arm    Shortness of breath    Nausea or vomiting    Lightheadedness or a sudden cold sweat    You become confused or have difficulty speaking  You suddenly feel lightheaded or have trouble breathing  Seek care immediately if:   You have a severe headache or vision loss  You have weakness in an arm or leg  Call your doctor or cardiologist if:   You feel faint, dizzy, confused, or drowsy  You have been taking your blood pressure medicine but your pressure is higher than your provider says it should be  You have questions or concerns about your condition or care  Treatment for chronic hypertension  may include medicine to lower your blood pressure and cholesterol levels  A low cholesterol level helps prevent heart disease and makes it easier to control your blood pressure  Heart disease can make your blood pressure harder to control  You may also need to make lifestyle changes  What you need to know about the stages of hypertension:       Normal blood pressure is 119/79 or lower   Your healthcare provider may only check your blood pressure each year if it stays at a normal level  Elevated blood pressure is 120/79 to 129/79   This is sometimes called prehypertension  Your healthcare provider may suggest lifestyle changes to help lower your blood pressure to a normal level  He or she may then check it again in 3 to 6 months  Stage 1 hypertension is 130/80  to 139/89   Your provider may recommend lifestyle changes, medication, and checks every 3 to 6 months until your blood pressure is controlled  Stage 2 hypertension is 140/90 or higher   Your provider will recommend lifestyle changes and have you take 2 kinds of hypertension medicines  You will also need to have your blood pressure checked monthly until it is controlled  Manage chronic hypertension:   Check your blood pressure at home  Avoid smoking, caffeine, and exercise at least 30 minutes before checking your blood pressure  Sit and rest for 5 minutes before you take your blood pressure  Extend your arm and support it on a flat surface  Your arm should be at the same level as your heart  Follow the directions that came with your blood pressure monitor  Check your blood pressure 2 times, 1 minute apart, before you take your medicine in the morning  Also check your blood pressure before your evening meal  Keep a record of your readings and bring it to your follow-up visits  Ask your healthcare provider what your blood pressure should be  Manage any other health conditions you have  Health conditions such as diabetes can increase your risk for hypertension  Follow your healthcare provider's instructions and take all your medicines as directed  Talk to your healthcare provider about any new health conditions you have recently developed  Ask about all medicines  Certain medicines can increase your blood pressure  Examples include oral birth control pills, decongestants, herbal supplements, and NSAIDs, such as ibuprofen  Your healthcare provider can tell you which medicines are safe for you to take  This includes prescription and over-the-counter medicines      Lifestyle changes you can make to lower your blood pressure: Your provider may want you to make more lifestyle changes if you are having trouble controlling your blood pressure  This may feel difficult over time, especially if you think you are making good changes but your pressure is still high  It might help to focus on one new change at a time  For example, try to add 1 more day of exercise, or exercise for an extra 10 minutes on 2 days  Small changes can make a big difference  Your healthcare provider can also refer you to specialists such as a dietitian who can help you make small changes  Your family members may be included in helping you learn to create lifestyle changes, such as the following:     Limit sodium (salt) as directed  Too much sodium can affect your fluid balance  Check labels to find low-sodium or no-salt-added foods  Some low-sodium foods use potassium salts for flavor  Too much potassium can also cause health problems  Your healthcare provider will tell you how much sodium and potassium are safe for you to have in a day  He or she may recommend that you limit sodium to 2,300 mg a day  Follow the meal plan recommended by your healthcare provider  A dietitian or your provider can give you more information on low-sodium plans or the DASH (Dietary Approaches to Stop Hypertension) eating plan  The DASH plan is low in sodium, processed sugar, unhealthy fats, and total fat  It is high in potassium, calcium, and fiber  These can be found in vegetables, fruit, and whole-grain foods  Be physically active throughout the day  Physical activity, such as exercise, can help control your blood pressure and your weight  Be physically active for at least 30 minutes per day, on most days of the week  Include aerobic activity, such as walking or riding a bicycle  Also include strength training at least 2 times each week  Your healthcare providers can help you create a physical activity plan  Decrease stress    This may help lower your blood pressure  Learn ways to relax, such as deep breathing or listening to music  Limit alcohol as directed  Alcohol can increase your blood pressure  A drink of alcohol is 12 ounces of beer, 5 ounces of wine, or 1½ ounces of liquor  Do not smoke  Nicotine and other chemicals in cigarettes and cigars can increase your blood pressure and also cause lung damage  Ask your healthcare provider for information if you currently smoke and need help to quit  E-cigarettes or smokeless tobacco still contain nicotine  Talk to your healthcare provider before you use these products  Follow up with your doctor or cardiologist as directed: You will need to return to have your blood pressure checked and to have other lab tests done  Write down your questions so you remember to ask them during your visits  © Copyright [employer] 2022 Information is for End User's use only and may not be sold, redistributed or otherwise used for commercial purposes  All illustrations and images included in CareNotes® are the copyrighted property of A D A M , Inc  or [facility] Becca Arnold   The above information is an  only  It is not intended as medical advice for individual conditions or treatments  Talk to your doctor, nurse or pharmacist before following any medical regimen to see if it is safe and effective for you

## 2023-01-03 ENCOUNTER — APPOINTMENT (OUTPATIENT)
Dept: LAB | Facility: CLINIC | Age: 68
End: 2023-01-03

## 2023-01-03 DIAGNOSIS — E03.9 ACQUIRED HYPOTHYROIDISM: ICD-10-CM

## 2023-01-03 DIAGNOSIS — D50.8 IRON DEFICIENCY ANEMIA SECONDARY TO INADEQUATE DIETARY IRON INTAKE: ICD-10-CM

## 2023-01-03 DIAGNOSIS — R79.9 ABNORMAL FINDING OF BLOOD CHEMISTRY, UNSPECIFIED: ICD-10-CM

## 2023-01-03 DIAGNOSIS — E78.2 MIXED HYPERLIPIDEMIA: ICD-10-CM

## 2023-01-03 DIAGNOSIS — I10 BENIGN ESSENTIAL HYPERTENSION: ICD-10-CM

## 2023-01-03 DIAGNOSIS — I48.0 PAROXYSMAL ATRIAL FIBRILLATION (HCC): ICD-10-CM

## 2023-01-03 DIAGNOSIS — Z13.1 SCREENING FOR DIABETES MELLITUS (DM): ICD-10-CM

## 2023-01-03 LAB
BASOPHILS # BLD AUTO: 0.01 THOUSANDS/ÂΜL (ref 0–0.1)
BASOPHILS NFR BLD AUTO: 0 % (ref 0–1)
BILIRUB UR QL STRIP: NEGATIVE
CLARITY UR: CLEAR
COLOR UR: YELLOW
EOSINOPHIL # BLD AUTO: 0.07 THOUSAND/ÂΜL (ref 0–0.61)
EOSINOPHIL NFR BLD AUTO: 1 % (ref 0–6)
ERYTHROCYTE [DISTWIDTH] IN BLOOD BY AUTOMATED COUNT: 12.3 % (ref 11.6–15.1)
GLUCOSE UR STRIP-MCNC: NEGATIVE MG/DL
HCT VFR BLD AUTO: 42 % (ref 36.5–49.3)
HGB BLD-MCNC: 13.5 G/DL (ref 12–17)
HGB UR QL STRIP.AUTO: NEGATIVE
IMM GRANULOCYTES # BLD AUTO: 0.01 THOUSAND/UL (ref 0–0.2)
IMM GRANULOCYTES NFR BLD AUTO: 0 % (ref 0–2)
KETONES UR STRIP-MCNC: NEGATIVE MG/DL
LEUKOCYTE ESTERASE UR QL STRIP: NEGATIVE
LYMPHOCYTES # BLD AUTO: 1.18 THOUSANDS/ÂΜL (ref 0.6–4.47)
LYMPHOCYTES NFR BLD AUTO: 22 % (ref 14–44)
MCH RBC QN AUTO: 29.7 PG (ref 26.8–34.3)
MCHC RBC AUTO-ENTMCNC: 32.1 G/DL (ref 31.4–37.4)
MCV RBC AUTO: 93 FL (ref 82–98)
MONOCYTES # BLD AUTO: 0.62 THOUSAND/ÂΜL (ref 0.17–1.22)
MONOCYTES NFR BLD AUTO: 11 % (ref 4–12)
NEUTROPHILS # BLD AUTO: 3.59 THOUSANDS/ÂΜL (ref 1.85–7.62)
NEUTS SEG NFR BLD AUTO: 66 % (ref 43–75)
NITRITE UR QL STRIP: NEGATIVE
NRBC BLD AUTO-RTO: 0 /100 WBCS
PH UR STRIP.AUTO: 6 [PH]
PLATELET # BLD AUTO: 181 THOUSANDS/UL (ref 149–390)
PMV BLD AUTO: 11 FL (ref 8.9–12.7)
PROT UR STRIP-MCNC: NEGATIVE MG/DL
RBC # BLD AUTO: 4.54 MILLION/UL (ref 3.88–5.62)
SP GR UR STRIP.AUTO: 1.02 (ref 1–1.03)
UROBILINOGEN UR STRIP-ACNC: <2 MG/DL
WBC # BLD AUTO: 5.48 THOUSAND/UL (ref 4.31–10.16)

## 2023-01-04 LAB
ALBUMIN SERPL BCP-MCNC: 4.3 G/DL (ref 3.5–5)
ALP SERPL-CCNC: 87 U/L (ref 46–116)
ALT SERPL W P-5'-P-CCNC: 25 U/L (ref 12–78)
ANION GAP SERPL CALCULATED.3IONS-SCNC: 10 MMOL/L (ref 4–13)
AST SERPL W P-5'-P-CCNC: 22 U/L (ref 5–45)
BILIRUB SERPL-MCNC: 0.63 MG/DL (ref 0.2–1)
BUN SERPL-MCNC: 28 MG/DL (ref 5–25)
CALCIUM SERPL-MCNC: 9.8 MG/DL (ref 8.3–10.1)
CHLORIDE SERPL-SCNC: 105 MMOL/L (ref 96–108)
CO2 SERPL-SCNC: 26 MMOL/L (ref 21–32)
CREAT SERPL-MCNC: 1.01 MG/DL (ref 0.6–1.3)
CREAT UR-MCNC: 104 MG/DL
EST. AVERAGE GLUCOSE BLD GHB EST-MCNC: 100 MG/DL
GFR SERPL CREATININE-BSD FRML MDRD: 76 ML/MIN/1.73SQ M
GLUCOSE P FAST SERPL-MCNC: 87 MG/DL (ref 65–99)
HBA1C MFR BLD: 5.1 %
LDLC SERPL DIRECT ASSAY-MCNC: 118 MG/DL (ref 0–100)
MICROALBUMIN UR-MCNC: 5.5 MG/L (ref 0–20)
MICROALBUMIN/CREAT 24H UR: 5 MG/G CREATININE (ref 0–30)
POTASSIUM SERPL-SCNC: 5.6 MMOL/L (ref 3.5–5.3)
PROT SERPL-MCNC: 7.4 G/DL (ref 6.4–8.4)
SODIUM SERPL-SCNC: 141 MMOL/L (ref 135–147)
TRIGL SERPL-MCNC: 66 MG/DL
TSH SERPL DL<=0.05 MIU/L-ACNC: 1.71 UIU/ML (ref 0.45–4.5)

## 2023-02-07 ENCOUNTER — TELEPHONE (OUTPATIENT)
Dept: INTERNAL MEDICINE CLINIC | Facility: CLINIC | Age: 68
End: 2023-02-07
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
## 2023-02-07 NOTE — TELEPHONE ENCOUNTER
Called transferred to me, staff having trouble convenience  him to come in for an appt or go to the ER, pt having recurrent mouth drooping slurred speech and walking problems, lasting for 5 minutes  Wife said he did not want to come in because he had a lunch scheduled  I explained that  it important to be checked by a doctor especially if he possible having a TIA , I explained there potential sided effects of having a Tia or stoke  His wife wanted to know what would the doctor Walter Baptiste do in the office I said BP/EKG evaluate physical features   Worst case he would referred him to the ER for his benefit  She said he refusing to come in because of his lunch and she is very concerned  I said he needs to think about what is more important his life or his lunch  I said I don't know what this lunch is for but I think his life is important  She said she will discuss it with and call me back  Provider verbally aware of situation         Wife called back and scheduled an appt  Referred to ER if s/s occur or worsening  between now and his appt

## 2023-02-08 ENCOUNTER — OFFICE VISIT (OUTPATIENT)
Dept: INTERNAL MEDICINE CLINIC | Facility: CLINIC | Age: 68
End: 2023-02-08

## 2023-02-08 VITALS
OXYGEN SATURATION: 100 % | TEMPERATURE: 95.2 F | DIASTOLIC BLOOD PRESSURE: 58 MMHG | WEIGHT: 173 LBS | HEIGHT: 68 IN | SYSTOLIC BLOOD PRESSURE: 110 MMHG | HEART RATE: 68 BPM | BODY MASS INDEX: 26.22 KG/M2

## 2023-02-08 DIAGNOSIS — R29.90 STROKE-LIKE SYMPTOMS: Primary | ICD-10-CM

## 2023-02-08 DIAGNOSIS — C64.9 MALIGNANT NEOPLASM OF KIDNEY, UNSPECIFIED LATERALITY (HCC): ICD-10-CM

## 2023-02-08 DIAGNOSIS — I48.0 PAROXYSMAL ATRIAL FIBRILLATION (HCC): ICD-10-CM

## 2023-02-08 DIAGNOSIS — C79.31 METASTASIS TO BRAIN (HCC): ICD-10-CM

## 2023-02-08 RX ORDER — ASPIRIN 81 MG/1
TABLET, CHEWABLE ORAL
Qty: 30 TABLET | Refills: 0
Start: 2023-02-08

## 2023-02-08 RX ORDER — CLOPIDOGREL BISULFATE 75 MG/1
75 TABLET ORAL DAILY
Qty: 20 TABLET | Refills: 0 | Status: SHIPPED | OUTPATIENT
Start: 2023-02-08

## 2023-02-08 NOTE — PROGRESS NOTES
Assessment/Plan:  Problem List Items Addressed This Visit        Cardiovascular and Mediastinum    Paroxysmal atrial fibrillation (HCC)    Relevant Medications    clopidogrel (Plavix) 75 mg tablet    Other Relevant Orders    AMB extended holter monitor    Ambulatory Referral to Neurology       Nervous and Auditory    Metastasis to brain [facility])    Relevant Orders    AMB extended holter monitor    Ambulatory Referral to Neurology       Genitourinary    Malignant neoplasm of kidney ([facility] [address] )    Relevant Orders    AMB extended holter monitor    Ambulatory Referral to Neurology   Other Visit Diagnoses     Stroke-like symptoms    -  Primary    Relevant Medications    clopidogrel (Plavix) 75 mg tablet    aspirin 81 mg chewable tablet    Other Relevant Orders    AMB extended holter monitor    Ambulatory Referral to Neurology           Diagnoses and all orders for this visit:    Stroke-like symptoms  -     AMB extended holter monitor; Future  -     clopidogrel (Plavix) 75 mg tablet; Take 1 tablet (75 mg total) by mouth daily  -     Ambulatory Referral to Neurology; Future  -     aspirin 81 mg chewable tablet; Two tabs po q day  Malignant neoplasm of kidney, unspecified laterality (HCC)  -     AMB extended holter monitor; Future  -     Ambulatory Referral to Neurology; Future    Metastasis to brain (HCC)  -     AMB extended holter monitor; Future  -     Ambulatory Referral to Neurology; Future    Paroxysmal atrial fibrillation (HCC)  -     AMB extended holter monitor; Future  -     Ambulatory Referral to Neurology; Future      No problem-specific Assessment & Plan notes found for this encounter  A/P: Stable and PE not overly impressive at this time  History is good for TIA, but extensive w/u one year ago negative  ??cause  Pt appears to be in NSR  ?if he is having more AFIB and showering emboli  Will check a zio patch  Will increase ASA to two 81mg q day  Will restart the plavix  Refer back to neuro   Continue to modify risk and control BP, LDL, and sugars  ?cavernous changes on the MRI causing an issues  RTC as scheduled  Subjective:      Patient ID: Noemi Blunt is a 79 y o  male  WM with PMH of renal cell ca with brain mets s/p XRT and PAF, presents for several episodes of TIA s/s  Pt with ? TIA with negative w/u 1/22 with MRI, CTA and echo  Seen by neuro and started on ASA, plavix, and statin  Has since stopped the plavix  WIfe reports four further TIA like events over the past year  The last about four days ago  No prodrome or aura  No headache or sz like activity  No incontinence  Pt reports some dizziness and not feeling well for five minutes  Wife reports Slurred speech, facial droop, and weakness  No associated CP, SOB, palpitations, or lightheadedness  The following portions of the patient's history were reviewed and updated as appropriate:   He has a past medical history of Abnormal eye finding, Anemia, Atrial fibrillation ([address] ), Colon polyp, Generalized anxiety disorder, History of kidney cancer, Hyperlipidemia, Hypertension, and TIA (transient ischemic attack)  ,  does not have any pertinent problems on file  ,   has a past surgical history that includes External ear surgery; Kidney surgery; Tibia fracture surgery; Colonoscopy (N/A, 3/10/2017); Tonsillectomy (childhood); and Tooth extraction (08/11/2021)  ,  family history includes Cancer in his paternal uncle; No Known Problems in his mother  ,   reports that he quit smoking about 24 years ago  His smoking use included cigarettes  He has never used smokeless tobacco  He reports that he does not drink alcohol and does not use drugs  ,  is allergic to prednisone     Current Outpatient Medications   Medication Sig Dispense Refill   • Ascorbic Acid (vitamin C) 100 MG tablet Take 100 mg by mouth daily     • aspirin 81 mg chewable tablet Two tabs po q day   30 tablet 0   • Bevacizumab (AVASTIN IV) Infuse into a venous catheter Every 4 weeks      • clopidogrel (Plavix) 75 mg tablet Take 1 tablet (75 mg total) by mouth daily 20 tablet 0   • sertraline (ZOLOFT) 50 mg tablet TAKE 1 TABLET DAILY 90 tablet 3   • atorvastatin (LIPITOR) 40 mg tablet Take 1 tablet (40 mg total) by mouth every evening (Patient not taking: Reported on 3/4/2022) 30 tablet 0   • montelukast (SINGULAIR) 10 mg tablet  (Patient not taking: Reported on 3/31/2022)       No current facility-administered medications for this visit  Review of Systems   Constitutional: Negative for activity change, chills, diaphoresis, fatigue and fever  HENT: Negative  Eyes: Negative for visual disturbance  Respiratory: Negative for cough, chest tightness, shortness of breath and wheezing  Cardiovascular: Negative for chest pain, palpitations and leg swelling  Gastrointestinal: Negative for abdominal pain, constipation, diarrhea, nausea and vomiting  Genitourinary: Negative for difficulty urinating, dysuria and frequency  Musculoskeletal: Negative for arthralgias, gait problem and myalgias  Neurological: Positive for dizziness, facial asymmetry, speech difficulty and weakness  Negative for tremors, seizures, syncope, light-headedness, numbness and headaches  Psychiatric/Behavioral: Negative for confusion  The patient is not nervous/anxious  PHQ-2/9 Depression Screening          Objective:  Vitals:    02/08/23 1112   BP: 110/58   BP Location: Left arm   Patient Position: Sitting   Cuff Size: Standard   Pulse: 68   Temp: (!) 95 2 °F (35 1 °C)   SpO2: 100%   Weight: 78 5 kg (173 lb)   Height: 5' 8" (1 727 m)     Body mass index is 26 3 kg/m²  Physical Exam  Vitals and nursing note reviewed  Constitutional:       General: He is not in acute distress  Appearance: Normal appearance  He is not ill-appearing  HENT:      Head: Normocephalic and atraumatic  Mouth/Throat:      Mouth: Mucous membranes are moist    Eyes:      Extraocular Movements: Extraocular movements intact  Conjunctiva/sclera: Conjunctivae normal       Pupils: Pupils are equal, round, and reactive to light  Neck:      Vascular: No carotid bruit  Cardiovascular:      Rate and Rhythm: Normal rate and regular rhythm  Heart sounds: Normal heart sounds  No murmur heard  Pulmonary:      Effort: Pulmonary effort is normal  No respiratory distress  Breath sounds: Normal breath sounds  No wheezing, rhonchi or rales  Abdominal:      General: Bowel sounds are normal  There is no distension  Palpations: Abdomen is soft  Tenderness: There is no abdominal tenderness  Musculoskeletal:      Cervical back: Normal range of motion and neck supple  No rigidity or tenderness  Right lower leg: No edema  Left lower leg: No edema  Lymphadenopathy:      Cervical: No cervical adenopathy  Neurological:      General: No focal deficit present  Mental Status: He is alert and oriented to person, place, and time  Mental status is at baseline  Cranial Nerves: No cranial nerve deficit  Motor: No weakness  Coordination: Coordination normal       Gait: Gait normal       Deep Tendon Reflexes: Reflexes normal    Psychiatric:         Mood and Affect: Mood normal          Behavior: Behavior normal          Thought Content:  Thought content normal          Judgment: Judgment normal

## 2023-02-09 ENCOUNTER — CLINICAL SUPPORT (OUTPATIENT)
Dept: CARDIOLOGY CLINIC | Facility: CLINIC | Age: 68
End: 2023-02-09

## 2023-02-09 DIAGNOSIS — I48.0 PAROXYSMAL ATRIAL FIBRILLATION (HCC): ICD-10-CM

## 2023-02-09 DIAGNOSIS — Z86.73 HISTORY OF TIA (TRANSIENT ISCHEMIC ATTACK): ICD-10-CM

## 2023-02-09 DIAGNOSIS — F41.9 ANXIETY: ICD-10-CM

## 2023-02-09 NOTE — PROGRESS NOTES
ziopatch applied x2 weeks for dr Kandi Arias for stroke-like symptoms, to r/o PAF  Patient will come back in two weeks for another ziopatch x2 weeks

## 2023-02-23 ENCOUNTER — CLINICAL SUPPORT (OUTPATIENT)
Dept: CARDIOLOGY CLINIC | Facility: CLINIC | Age: 68
End: 2023-02-23

## 2023-02-23 DIAGNOSIS — I48.0 PAF (PAROXYSMAL ATRIAL FIBRILLATION) (HCC): Primary | ICD-10-CM

## 2023-02-23 DIAGNOSIS — G45.9 TIA (TRANSIENT ISCHEMIC ATTACK): ICD-10-CM

## 2023-02-23 NOTE — PROGRESS NOTES
Ziopatch applied x14 days for stroke-like symptoms, PAF, TIAs per Dr Merary Aguilera  This is the second patch

## 2023-02-27 ENCOUNTER — TELEPHONE (OUTPATIENT)
Dept: INTERNAL MEDICINE CLINIC | Facility: CLINIC | Age: 68
End: 2023-02-27

## 2023-02-27 DIAGNOSIS — R29.90 STROKE-LIKE SYMPTOMS: ICD-10-CM

## 2023-02-27 RX ORDER — CLOPIDOGREL BISULFATE 75 MG/1
75 TABLET ORAL DAILY
Qty: 30 TABLET | Refills: 3 | Status: SHIPPED | OUTPATIENT
Start: 2023-02-27

## 2023-02-27 NOTE — TELEPHONE ENCOUNTER
Pts wife is aware to stop the plavix tomorrow,    She stated that he got a 20 day supply, wondering if we could fill it again because they did not get an apt for the nuero

## 2023-02-27 NOTE — TELEPHONE ENCOUNTER
Pt is getting eye injections on Friday and he is concerned that sometimes they hit a blood vessel and it may bleed  Pt is wondering if he should stop taking his Plavix for the next few days         Please advise,  Thank you

## 2023-03-01 NOTE — TELEPHONE ENCOUNTER
Pts wife came into the office today and asked, after Garys injections on Friday when should he start taking the Plavix again        Thank you    Her call back number [phone]

## 2023-03-02 NOTE — TELEPHONE ENCOUNTER
Pt has stopped meds, he would like to know after his injections on Friday when he should start taking the medication again  Over the weekend or on Monday       Thanks

## 2023-03-08 ENCOUNTER — CLINICAL SUPPORT (OUTPATIENT)
Dept: CARDIOLOGY CLINIC | Facility: CLINIC | Age: 68
End: 2023-03-08

## 2023-03-08 DIAGNOSIS — C79.31 METASTASIS TO BRAIN (HCC): ICD-10-CM

## 2023-03-08 DIAGNOSIS — R29.90 STROKE-LIKE SYMPTOMS: ICD-10-CM

## 2023-03-08 DIAGNOSIS — C64.9 MALIGNANT NEOPLASM OF KIDNEY, UNSPECIFIED LATERALITY (HCC): ICD-10-CM

## 2023-03-08 DIAGNOSIS — I48.0 PAROXYSMAL ATRIAL FIBRILLATION (HCC): ICD-10-CM

## 2023-03-09 NOTE — RESULT ENCOUNTER NOTE
Basic mechanism was sinus with rates ranging from 42 - 126 beats per minute while in sinus rhythm  The average heart rate was 61 beats per minute  Atrial ectopy was  common including several short atrial runs  Ventricular ectopy was uncommon and there were no runs  No pauses were present  No symptoms were reported          Jose Krishnan MD

## 2023-03-21 ENCOUNTER — APPOINTMENT (EMERGENCY)
Dept: CT IMAGING | Facility: HOSPITAL | Age: 68
End: 2023-03-21

## 2023-03-21 ENCOUNTER — CLINICAL SUPPORT (OUTPATIENT)
Dept: CARDIOLOGY CLINIC | Facility: CLINIC | Age: 68
End: 2023-03-21

## 2023-03-21 ENCOUNTER — HOSPITAL ENCOUNTER (EMERGENCY)
Facility: HOSPITAL | Age: 68
Discharge: HOME | End: 2023-03-21
Attending: EMERGENCY MEDICINE | Admitting: EMERGENCY MEDICINE

## 2023-03-21 ENCOUNTER — APPOINTMENT (EMERGENCY)
Dept: RADIOLOGY | Facility: HOSPITAL | Age: 68
End: 2023-03-21

## 2023-03-21 ENCOUNTER — OFFICE VISIT (OUTPATIENT)
Dept: URGENT CARE | Facility: CLINIC | Age: 68
End: 2023-03-21

## 2023-03-21 VITALS
HEART RATE: 55 BPM | DIASTOLIC BLOOD PRESSURE: 61 MMHG | RESPIRATION RATE: 16 BRPM | TEMPERATURE: 97.7 F | OXYGEN SATURATION: 99 % | SYSTOLIC BLOOD PRESSURE: 111 MMHG

## 2023-03-21 VITALS
OXYGEN SATURATION: 99 % | DIASTOLIC BLOOD PRESSURE: 61 MMHG | WEIGHT: 173 LBS | HEART RATE: 66 BPM | TEMPERATURE: 98.5 F | BODY MASS INDEX: 26.3 KG/M2 | SYSTOLIC BLOOD PRESSURE: 119 MMHG | RESPIRATION RATE: 18 BRPM

## 2023-03-21 DIAGNOSIS — S01.112A EYEBROW LACERATION, LEFT, INITIAL ENCOUNTER: Primary | ICD-10-CM

## 2023-03-21 DIAGNOSIS — I48.0 PAROXYSMAL ATRIAL FIBRILLATION (HCC): Primary | ICD-10-CM

## 2023-03-21 DIAGNOSIS — Z92.29 HX OF LONG TERM USE OF BLOOD THINNERS: ICD-10-CM

## 2023-03-21 DIAGNOSIS — S00.10XA: ICD-10-CM

## 2023-03-21 DIAGNOSIS — S00.12XA: ICD-10-CM

## 2023-03-21 DIAGNOSIS — S01.112A LACERATION OF LEFT EYEBROW, INITIAL ENCOUNTER: Primary | ICD-10-CM

## 2023-03-21 NOTE — ED PROVIDER NOTES
Emergency Department Trauma Note  Ashkan Yanez 79 y o  male MRN: [id number]  Unit/Bed#: ED 26/ED 26 Encounter: [id number]      Trauma Alert: Trauma Acuity: Trauma Evaluation  Model of Arrival: Mode of Arrival: Direct from scene via    Trauma Team: Current Providers  Attending Provider: Elmer Crews MD  Registered Nurse: Mejia Aguero, RN  Registered Nurse: Nga Duran RN  Consultants:     None      History of Present Illness     Chief Complaint:   Chief Complaint   Patient presents with   • Eye Laceration     Pt sent by urgent care; reports he was cutting a tree limb when top of saw broke off and hit him above L eye; +plavix; denies LOC     HPI:  Ashkan Yanez is a 79 y o  male who presents with   Mechanism:Details of Incident: Patient was cutting limbs on tree with a saw at the top when saw fell off and hit him above the left eye Injury Date: 03/21/23 Injury Time: 1200      Patient is a 49-year-old male the presents for evaluation of head trauma on Plavix  Patient says that he was cutting branches at home when the saw on a pothole and fell came back hitting him in the left eyebrow  He had a laceration above his left eyebrow  No headache nausea vomiting altered mental status or focal neurologic deficits  Laceration was repaired in urgent care  Sent here for CT imaging secondary to head trauma on blood thinners  He otherwise denies complaints of neck pain chest pain dyspnea abdominal pain  Review of Systems   Constitutional: Negative for fever  HENT: Negative for sore throat  Left eyebrow laceration   Eyes: Negative for photophobia  Respiratory: Negative for shortness of breath  Cardiovascular: Negative for chest pain  Gastrointestinal: Negative for abdominal pain  Genitourinary: Negative for dysuria  Musculoskeletal: Negative for back pain  Skin: Negative for rash  Neurological: Negative for light-headedness  Hematological: Negative for adenopathy  Psychiatric/Behavioral: Negative for agitation  All other systems reviewed and are negative        Historical Information     Immunizations:   Immunization History   Administered Date(s) Administered   • COVID-19 PFIZER VACCINE 0 3 ML IM 2021, 2021, 2021   • INFLUENZA 2014, 10/15/2015, 10/03/2016, 10/02/2017, 10/03/2022   • Influenza Quadrivalent Preservative Free 3 years and older IM 10/15/2015, 10/03/2016   • Influenza Quadrivalent, 6-35 Months IM 10/02/2017   • Influenza, high dose seasonal 0 7 mL 10/22/2020, 2021, 10/03/2022   • Influenza, recombinant, quadrivalent,injectable, preservative free 2018, 2019   • Influenza, seasonal, injectable 10/01/2013, 2014   • Td (adult), adsorbed 2004   • Tdap 2015       Past Medical History:   Diagnosis Date   • Abnormal eye finding     last assessed 2015   • Anemia    • Atrial fibrillation ([facility] [address] )     patient denies   • Colon polyp    • Generalized anxiety disorder    • History of kidney cancer    • Hyperlipidemia     no longer   • Hypertension     no longer   • TIA (transient ischemic attack)      Family History   Problem Relation Age of Onset   • No Known Problems Mother    • Cancer Paternal Uncle      Past Surgical History:   Procedure Laterality Date   • COLONOSCOPY N/A 3/10/2017    Procedure: COLONOSCOPY;  Surgeon: Latricia Ott MD;  Location: [facility] MA[facility] OR;  Service:    • EXTERNAL EAR SURGERY     • KIDNEY SURGERY     • TIBIA FRACTURE SURGERY     • TONSILLECTOMY  childhood   • TOOTH EXTRACTION  2021     Social History     Tobacco Use   • Smoking status: Former     Types: Cigarettes     Quit date:      Years since quittin 2   • Smokeless tobacco: Never   Vaping Use   • Vaping Use: Never used   Substance Use Topics   • Alcohol use: No     Comment: rarely   • Drug use: Never     E-Cigarette/Vaping   • E-Cigarette Use Never User      E-Cigarette/Vaping Substances   • Nicotine No    • THC No    • CBD No    • Flavoring No    • Other No    • Unknown No        Family History: non-contributory    Meds/Allergies   Prior to Admission Medications   Prescriptions Last Dose Informant Patient Reported? Taking? Ascorbic Acid (vitamin C) 100 MG tablet   Yes No   Sig: Take 100 mg by mouth daily   Bevacizumab (AVASTIN IV)   Yes No   Sig: Infuse into a venous catheter Every 4 weeks    aspirin 81 mg chewable tablet   No No   Sig: Two tabs po q day  atorvastatin (LIPITOR) 40 mg tablet   No No   Sig: Take 1 tablet (40 mg total) by mouth every evening   Patient not taking: Reported on 3/4/2022   clopidogrel (Plavix) 75 mg tablet   No No   Sig: Take 1 tablet (75 mg total) by mouth daily   montelukast (SINGULAIR) 10 mg tablet   Yes No   Patient not taking: Reported on 3/31/2022   sertraline (ZOLOFT) 50 mg tablet   No No   Sig: Take 1 tablet (50 mg total) by mouth daily      Facility-Administered Medications: None       Allergies   Allergen Reactions   • Prednisone GI Intolerance       PHYSICAL EXAM    PE limited by: NA    Objective   Vitals:   First set: Temperature: 97 7 °F (36 5 °C) (03/21/23 1633)  Pulse: 64 (03/21/23 1633)  Respirations: 16 (03/21/23 1633)  Blood Pressure: 116/74 (03/21/23 1633)  SpO2: 98 % (03/21/23 1633)    Primary Survey:   (A) Airway: Intact  (B) Breathing: Bilateral breath sounds  (C) Circulation: Pulses:   normal  (D) Disabliity:  GCS Total:  15  (E) Expose:  Completed    Secondary Survey: (Click on Physical Exam tab above)  Physical Exam  Vitals reviewed  Constitutional:       General: He is not in acute distress  Appearance: He is well-developed  HENT:      Head: Normocephalic  Comments: Swelling to the left eyebrow  2 cm laceration repaired with sutures  Eyes:      Pupils: Pupils are equal, round, and reactive to light  Cardiovascular:      Rate and Rhythm: Normal rate and regular rhythm  Heart sounds: Normal heart sounds  No murmur heard  No friction rub  No gallop  Pulmonary:      Effort: Pulmonary effort is normal       Breath sounds: Normal breath sounds  Abdominal:      General: Bowel sounds are normal  There is no distension  Palpations: Abdomen is soft  Tenderness: There is no abdominal tenderness  There is no guarding  Musculoskeletal:         General: Normal range of motion  Cervical back: Normal range of motion and neck supple  Skin:     Capillary Refill: Capillary refill takes less than 2 seconds  Neurological:      Mental Status: He is alert and oriented to person, place, and time  Cranial Nerves: No cranial nerve deficit  Sensory: No sensory deficit  Motor: No abnormal muscle tone  Psychiatric:         Behavior: Behavior normal          Thought Content: Thought content normal          Judgment: Judgment normal          Cervical spine cleared by clinical criteria? No (imaging required)      Invasive Devices     None                 Lab Results:   Results Reviewed     None                 Imaging Studies:   Direct to CT: No  TRAUMA - CT head wo contrast   Final Result by Stephanie Ring DO (03/21 1739)   Addendum (preliminary) 1 of 1 by Stephanie Ring DO (03/21 1739)   ADDENDUM:   Soft tissue edema left periorbital soft tissues  Final   Stable Gyriform cortical hyperdensity involving parasagittal bilateral occipital lobes represent represent laminar necrosis       No acute intracranial hemorrhage or territorial infarct  1            Workstation performed: [id number]         TRAUMA - CT spine cervical wo contrast   Final Result by Stephanie Ring DO (03/21 1738)   No cervical spine fracture or traumatic malalignment  Because this was designated a trauma evaluation: The study was marked in EPIC for immediate notification  CT brain already dictated               Workstation performed: [id number]         XR Trauma chest portable   Final Result by David Mckeon DO (03/21 1718)      No acute cardiopulmonary disease  Right upper lobe scarring  Workstation performed: [id number]               Procedures  POC FAST US    Date/Time: 3/21/2023 6:02 PM  Performed by: Penney Lundborg, MD  Authorized by: Penney Lundborg, MD     Patient location:  ED  Other Assisting Provider: No    Procedure details:     Exam Type:  Diagnostic    Indications: blunt abdominal trauma      Assess for:  Intra-abdominal fluid and pericardial effusion    Technique: FAST      Views obtained:  Heart - Pericardial sac, RUQ - Briggs's Pouch, LUQ - Splenorenal space and Suprapubic - Pouch of Jake    Image quality: diagnostic      Image availability:  Images available in PACS  FAST Findings:     RUQ (Hepatorenal) free fluid: absent      LUQ (Splenorenal) free fluid: absent      Suprapubic free fluid: absent      Cardiac wall motion: identified      Pericardial effusion: absent    Interpretation:     Impressions: negative               ED Course           Medical Decision Making  Patient is a 59-year-old male presents for evaluation of head trauma on Plavix  May trauma evaluation for this reason  Otherwise benign exam   CT imaging including CT head and C-spine were negative  Advised patient follow-up for suture removal and strict return precautions  Patient and wife in agreement with plan  Eyebrow contusion: complicated acute illness or injury  Eyebrow laceration, left, initial encounter: complicated acute illness or injury  Amount and/or Complexity of Data Reviewed  Radiology: ordered and independent interpretation performed                    Disposition  [facility] Transfer: No  Final diagnoses:   Eyebrow laceration, left, initial encounter   Eyebrow contusion     Time reflects when diagnosis was documented in both MDM as applicable and the Disposition within this note     Time User Action Codes Description Comment    3/21/2023  5:54 PM Corinne Lobo Add [H01 790C] Eyebrow laceration, left, initial encounter     3/21/2023  5:55 PM Nito Rothman Add [S00 10XA] Eyebrow contusion       ED Disposition     ED Disposition   Discharge    Condition   Stable    Date/Time   Tue Mar 21, 2023  5:54 PM    Comment   Denise Mata discharge to home/self care  Follow-up Information     Follow up With Specialties Details Why Contact Info Additional [address] Emergency Department Emergency Medicine  If symptoms worsen [address]  [place] [facility] [id number] [phone] [facility], [address], [address] [facility], [address] [facility]        Patient's Medications   Discharge Prescriptions    No medications on file     No discharge procedures on file      PDMP Review     None          ED Provider  Electronically Signed by         Lesley Guadalupe MD  03/21/23 6848

## 2023-03-21 NOTE — PATIENT INSTRUCTIONS
Recommend evaluation in the emergency room due to long term use of blood thinners and head contusion  Keep wound covered for 24 hours and initially do not get it wet  After 24 hours, may remove banadage and start leaving open to the air  Wash with warm water and mild soap  Apply triple antibiotic ointment twice navarro  Return in 5 days for suture removal       Tylenol as needed for pain  Do not take ibuprofen

## 2023-03-21 NOTE — PROGRESS NOTES
[facility]    NAME: Lindsey Fonseca is a 79 y o  male  : 1955    MRN: [id number]  DATE: 2023  TIME: 4:38 PM    Assessment and Plan   Laceration of left eyebrow, initial encounter [S01 112A]  1  Laceration of left eyebrow, initial encounter  CANCELED: Tdap Vaccine greater than or equal to 8yo      2  Contusion of eyebrow, left, initial encounter  Transfer to other facility      3  Hx of long term use of blood thinners  Transfer to other facility      Laceration repair    Date/Time: 3/21/2023 4:36 PM  Performed by: Marcial Elias PA-C  Authorized by: Marcial Elias PA-C   Patient identity confirmed: verbally with patient  Body area: head/neck  Location details: left eyebrow  Laceration length: 2 cm  Foreign bodies: no foreign bodies  Tendon involvement: none  Nerve involvement: none  Vascular damage: no  Anesthesia: local infiltration    Anesthesia:  Local Anesthetic: lidocaine 1% with epinephrine  Anesthetic total: 3 mL      Procedure Details:  Preparation: Patient was prepped and draped in the usual sterile fashion  Irrigation solution: saline  Amount of cleaning: standard  Debridement: none  Degree of undermining: none  Skin closure: 6-0 nylon  Number of sutures: 4  Technique: simple  Approximation: close  Approximation difficulty: simple  Dressing: antibiotic ointment (bandaid)  Patient tolerance: patient tolerated the procedure well with no immediate complications          Patient Instructions     Patient Instructions   Recommend evaluation in the emergency room due to long term use of blood thinners and head contusion  Keep wound covered for 24 hours and initially do not get it wet  After 24 hours, may remove banadage and start leaving open to the air  Wash with warm water and mild soap  Apply triple antibiotic ointment twice navarro  Return in 5 days for suture removal       Tylenol as needed for pain  Do not take ibuprofen        Chief Complaint     Chief Complaint   Patient presents with   • Laceration     Over left eye today       History of Present Illness   79year old male here with laceration left eyebrow  Was cutting a tree with a saw that was on an extended pole  The saw fell off and hit him just above his left eye  He states that he saw stars and it knocked him to his knees  No LOC  Denies nausea, vomiting, dizziness  Patient is on Plavix and ASA  Review of Systems   Review of Systems   Constitutional: Negative for chills and fever  Gastrointestinal: Negative for nausea and vomiting  Skin: Positive for wound  Neurological: Positive for headaches  Negative for dizziness, weakness and numbness         Current Medications     Current Outpatient Medications:   •  Ascorbic Acid (vitamin C) 100 MG tablet, Take 100 mg by mouth daily, Disp: , Rfl:   •  aspirin 81 mg chewable tablet, Two tabs po q day , Disp: 30 tablet, Rfl: 0  •  Bevacizumab (AVASTIN IV), Infuse into a venous catheter Every 4 weeks , Disp: , Rfl:   •  clopidogrel (Plavix) 75 mg tablet, Take 1 tablet (75 mg total) by mouth daily, Disp: 30 tablet, Rfl: 3  •  sertraline (ZOLOFT) 50 mg tablet, Take 1 tablet (50 mg total) by mouth daily, Disp: 90 tablet, Rfl: 3  •  atorvastatin (LIPITOR) 40 mg tablet, Take 1 tablet (40 mg total) by mouth every evening (Patient not taking: Reported on 3/4/2022), Disp: 30 tablet, Rfl: 0  •  montelukast (SINGULAIR) 10 mg tablet, , Disp: , Rfl:     Current Allergies     Allergies as of 03/21/2023 - Reviewed 03/21/2023   Allergen Reaction Noted   • Prednisone GI Intolerance 06/24/2020          The following portions of the patient's history were reviewed and updated as appropriate: allergies, current medications, past family history, past medical history, past social history, past surgical history and problem list    Past Medical History:   Diagnosis Date   • Abnormal eye finding     last assessed 08/18/2015   • Anemia    • Atrial fibrillation [facility])     patient denies   • Colon polyp    • Generalized anxiety disorder    • History of kidney cancer    • Hyperlipidemia     no longer   • Hypertension     no longer   • TIA (transient ischemic attack)      Past Surgical History:   Procedure Laterality Date   • COLONOSCOPY N/A 3/10/2017    Procedure: COLONOSCOPY;  Surgeon: Elena Gardner MD;  [facility];  Service:    • EXTERNAL EAR SURGERY     • KIDNEY SURGERY     • TIBIA FRACTURE SURGERY     • TONSILLECTOMY  childhood   • TOOTH EXTRACTION  2021     Family History   Problem Relation Age of Onset   • No Known Problems Mother    • Cancer Paternal Uncle      Social History     Socioeconomic History   • Marital status: /Civil Union     Spouse name: Not on file   • Number of children: Not on file   • Years of education: Not on file   • Highest education level: Not on file   Occupational History   • Occupation: Retired     Employer: OTHER   Tobacco Use   • Smoking status: Former     Types: Cigarettes     Quit date:      Years since quittin 2   • Smokeless tobacco: Never   Vaping Use   • Vaping Use: Never used   Substance and Sexual Activity   • Alcohol use: No     Comment: rarely   • Drug use: Never   • Sexual activity: Not on file   Other Topics Concern   • Not on file   Social History Narrative    Caffeine use- /2 caf 1/2 dec  Coffee- 1 1/2 cups     Social Determinants of Health     Financial Resource Strain: Not on file   Food Insecurity: Not on file   Transportation Needs: Not on file   Physical Activity: Not on file   Stress: Not on file   Social Connections: Not on file   Intimate Partner Violence: Not on file   Housing Stability: Not on file     Medications have been verified  Objective   /61   Pulse 66   Temp 98 5 °F (36 9 °C)   Resp 18   Wt 78 5 kg (173 lb)   SpO2 99%   BMI 26 30 kg/m²      Physical Exam   Physical Exam  Vitals and nursing note reviewed  Constitutional:       Appearance: Normal appearance  HENT:      Head: Normocephalic  Comments: 2 cm laceration left eyebrow  Ecchymosis and swelling just below and into left upper eyelid  Neurological:      Mental Status: He is alert  Cranial Nerves: Cranial nerves 2-12 are intact  No cranial nerve deficit  Sensory: Sensation is intact  No sensory deficit  Motor: Motor function is intact

## 2023-03-23 NOTE — RESULT ENCOUNTER NOTE
Basic mechanism was sinus with rates ranging from 39 - 135 beats per minute while in sinus rhythm  The average heart rate was 60 beats per minute  Atrial ectopy was  common including several short atrial runs  Ventricular ectopy was common including several triplets but no longer runs  No pauses were present  No symptoms were reported          Maria R Salcedo MD

## 2023-03-26 ENCOUNTER — OFFICE VISIT (OUTPATIENT)
Dept: URGENT CARE | Facility: CLINIC | Age: 68
End: 2023-03-26

## 2023-03-26 VITALS
SYSTOLIC BLOOD PRESSURE: 112 MMHG | OXYGEN SATURATION: 100 % | HEART RATE: 61 BPM | DIASTOLIC BLOOD PRESSURE: 62 MMHG | RESPIRATION RATE: 20 BRPM | TEMPERATURE: 97.8 F

## 2023-03-26 DIAGNOSIS — Z48.02 ENCOUNTER FOR REMOVAL OF SUTURES: ICD-10-CM

## 2023-03-26 DIAGNOSIS — S01.81XD FOREHEAD LACERATION, SUBSEQUENT ENCOUNTER: Primary | ICD-10-CM

## 2023-03-26 NOTE — PROGRESS NOTES
[address] [facility]        NAME: Licha Brenner is a 79 y o  male  : 1955    MRN: [id number]  DATE: 2023  TIME: 12:34 PM    Assessment and Plan   No primary diagnosis found  No diagnosis found  Patient Instructions       Follow up with PCP in 3-5 days  Proceed to  ER if symptoms worsen  Chief Complaint     Chief Complaint   Patient presents with   • Suture / Staple Removal     On left forehead  Injury occurred 3/21         History of Present Illness       Patient is a 80 y/o/m [facility] with for suture removal   Patient sustained a laceration to left forehead on 23  Pt had four sutures placed at that time and was referred to the ED due to taking anticoagulation  No bleeds reported  Pt reports sutures have not caused any complications  No fevers or drainage  Suture / Staple Removal  The sutures were placed 3 to 6 days ago  He tried antibiotic ointment use since the wound repair  The treatment provided mild relief  His temperature was unmeasured prior to arrival  There is no redness present  There is no swelling present  There is no pain present  Review of Systems   Review of Systems   Constitutional: Negative for chills and fever  HENT: Negative for ear pain and sore throat  Eyes: Negative for pain and visual disturbance  Respiratory: Negative for cough and shortness of breath  Cardiovascular: Negative for chest pain and palpitations  Gastrointestinal: Negative for abdominal pain and vomiting  Genitourinary: Negative for dysuria and hematuria  Musculoskeletal: Negative for arthralgias and back pain  Skin: Positive for color change, rash and wound  Neurological: Negative for seizures and syncope  All other systems reviewed and are negative          Current Medications       Current Outpatient Medications:   •  Ascorbic Acid (vitamin C) 100 MG tablet, Take 100 mg by mouth daily, Disp: , Rfl:   •  aspirin 81 mg chewable tablet, Two tabs po q day , Disp: 30 tablet, Rfl: 0  •  atorvastatin (LIPITOR) 40 mg tablet, Take 1 tablet (40 mg total) by mouth every evening (Patient not taking: Reported on 3/4/2022), Disp: 30 tablet, Rfl: 0  •  Bevacizumab (AVASTIN IV), Infuse into a venous catheter Every 4 weeks , Disp: , Rfl:   •  clopidogrel (Plavix) 75 mg tablet, Take 1 tablet (75 mg total) by mouth daily, Disp: 30 tablet, Rfl: 3  •  montelukast (SINGULAIR) 10 mg tablet, , Disp: , Rfl:   •  sertraline (ZOLOFT) 50 mg tablet, Take 1 tablet (50 mg total) by mouth daily, Disp: 90 tablet, Rfl: 3    Current Allergies     Allergies as of 03/26/2023 - Reviewed 03/26/2023   Allergen Reaction Noted   • Prednisone GI Intolerance 06/24/2020            The following portions of the patient's history were reviewed and updated as appropriate: allergies, current medications, past family history, past medical history, past social history, past surgical history and problem list      Past Medical History:   Diagnosis Date   • Abnormal eye finding     last assessed 08/18/2015   • Anemia    • Atrial fibrillation ([facility] [address] )     patient denies   • Colon polyp    • Generalized anxiety disorder    • History of kidney cancer    • Hyperlipidemia     no longer   • Hypertension     no longer   • TIA (transient ischemic attack)        Past Surgical History:   Procedure Laterality Date   • COLONOSCOPY N/A 3/10/2017    Procedure: COLONOSCOPY;  Surgeon: Wagner Gomez MD;  [facility];  Service:    • EXTERNAL EAR SURGERY     • KIDNEY SURGERY     • TIBIA FRACTURE SURGERY     • TONSILLECTOMY  childhood   • TOOTH EXTRACTION  08/11/2021       Family History   Problem Relation Age of Onset   • No Known Problems Mother    • Cancer Paternal Uncle          Medications have been verified  Objective   /62   Pulse 61   Temp 97 8 °F (36 6 °C)   Resp 20   SpO2 100%   No LMP for male patient  Physical Exam     Physical Exam  Constitutional:       Appearance: Normal appearance   He is normal weight  HENT:      Head: Normocephalic and atraumatic  Nose: Nose normal       Mouth/Throat:      Mouth: Mucous membranes are moist    Eyes:      Extraocular Movements: Extraocular movements intact  Conjunctiva/sclera: Conjunctivae normal       Pupils: Pupils are equal, round, and reactive to light  Cardiovascular:      Rate and Rhythm: Normal rate  Pulmonary:      Effort: Pulmonary effort is normal    Musculoskeletal:         General: Normal range of motion  Cervical back: Normal range of motion and neck supple  Skin:     General: Skin is warm and dry  Neurological:      General: No focal deficit present  Mental Status: He is alert and oriented to person, place, and time     Psychiatric:         Mood and Affect: Mood normal          Behavior: Behavior normal

## 2023-04-24 ENCOUNTER — APPOINTMENT (OUTPATIENT)
Dept: LAB | Facility: CLINIC | Age: 68
End: 2023-04-24

## 2023-04-24 ENCOUNTER — OFFICE VISIT (OUTPATIENT)
Dept: INTERNAL MEDICINE CLINIC | Facility: CLINIC | Age: 68
End: 2023-04-24

## 2023-04-24 VITALS
BODY MASS INDEX: 26.37 KG/M2 | HEART RATE: 61 BPM | OXYGEN SATURATION: 98 % | WEIGHT: 174 LBS | HEIGHT: 68 IN | TEMPERATURE: 97 F | SYSTOLIC BLOOD PRESSURE: 114 MMHG | DIASTOLIC BLOOD PRESSURE: 62 MMHG

## 2023-04-24 DIAGNOSIS — D50.8 IRON DEFICIENCY ANEMIA SECONDARY TO INADEQUATE DIETARY IRON INTAKE: ICD-10-CM

## 2023-04-24 DIAGNOSIS — N40.1 BENIGN PROSTATIC HYPERPLASIA WITH NOCTURIA: ICD-10-CM

## 2023-04-24 DIAGNOSIS — I48.0 PAROXYSMAL ATRIAL FIBRILLATION (HCC): ICD-10-CM

## 2023-04-24 DIAGNOSIS — E78.2 MIXED HYPERLIPIDEMIA: ICD-10-CM

## 2023-04-24 DIAGNOSIS — Z86.73 HISTORY OF TIA (TRANSIENT ISCHEMIC ATTACK): ICD-10-CM

## 2023-04-24 DIAGNOSIS — F41.1 GENERALIZED ANXIETY DISORDER: ICD-10-CM

## 2023-04-24 DIAGNOSIS — I10 BENIGN ESSENTIAL HYPERTENSION: Primary | ICD-10-CM

## 2023-04-24 DIAGNOSIS — I10 BENIGN ESSENTIAL HYPERTENSION: ICD-10-CM

## 2023-04-24 DIAGNOSIS — R35.1 BENIGN PROSTATIC HYPERPLASIA WITH NOCTURIA: ICD-10-CM

## 2023-04-24 DIAGNOSIS — E03.9 ACQUIRED HYPOTHYROIDISM: ICD-10-CM

## 2023-04-24 DIAGNOSIS — C64.9 MALIGNANT NEOPLASM OF KIDNEY, UNSPECIFIED LATERALITY (HCC): ICD-10-CM

## 2023-04-24 DIAGNOSIS — M15.9 GENERALIZED OSTEOARTHRITIS: ICD-10-CM

## 2023-04-24 DIAGNOSIS — Z00.00 MEDICARE ANNUAL WELLNESS VISIT, SUBSEQUENT: ICD-10-CM

## 2023-04-24 DIAGNOSIS — E66.3 OVERWEIGHT (BMI 25.0-29.9): ICD-10-CM

## 2023-04-24 LAB
ANION GAP SERPL CALCULATED.3IONS-SCNC: 4 MMOL/L (ref 4–13)
BUN SERPL-MCNC: 26 MG/DL (ref 5–25)
CALCIUM SERPL-MCNC: 9.9 MG/DL (ref 8.3–10.1)
CHLORIDE SERPL-SCNC: 105 MMOL/L (ref 96–108)
CO2 SERPL-SCNC: 29 MMOL/L (ref 21–32)
CREAT SERPL-MCNC: 1 MG/DL (ref 0.6–1.3)
GFR SERPL CREATININE-BSD FRML MDRD: 77 ML/MIN/1.73SQ M
GLUCOSE SERPL-MCNC: 50 MG/DL (ref 65–140)
POTASSIUM SERPL-SCNC: 4.4 MMOL/L (ref 3.5–5.3)
SODIUM SERPL-SCNC: 138 MMOL/L (ref 135–147)

## 2023-04-24 RX ORDER — TAMSULOSIN HYDROCHLORIDE 0.4 MG/1
0.4 CAPSULE ORAL
Qty: 30 CAPSULE | Refills: 5 | Status: SHIPPED | OUTPATIENT
Start: 2023-04-24

## 2023-04-24 NOTE — PROGRESS NOTES
Assessment and Plan:     Problem List Items Addressed This Visit        Endocrine    Hypothyroidism       Cardiovascular and Mediastinum    Paroxysmal atrial fibrillation (HCC)    Benign essential hypertension - Primary    Relevant Orders    Basic metabolic panel       Musculoskeletal and Integument    Generalized osteoarthritis       Genitourinary    Malignant neoplasm of kidney ([facility] [address] )       Other    Overweight (BMI 25 0-29  9)    Iron deficiency anemia secondary to inadequate dietary iron intake    Hyperlipidemia    History of TIA (transient ischemic attack)    Generalized anxiety disorder   Other Visit Diagnoses     Medicare annual wellness visit, subsequent        Benign prostatic hyperplasia with nocturia        Relevant Medications    tamsulosin (FLOMAX) 0 4 mg           Preventive health issues were discussed with patient, and age appropriate screening tests were ordered as noted in patient's After Visit Summary  Personalized health advice and appropriate referrals for health education or preventive services given if needed, as noted in patient's After Visit Summary       History of Present Illness:     Patient presents for a Medicare Wellness Visit    HPI   Patient Care Team:  Cami Harris DO as PCP - General (Internal Medicine)  MD Betzy Lucero MD as Endoscopist     Review of Systems:     Review of Systems     Problem List:     Patient Active Problem List   Diagnosis   • Benign essential hypertension   • Bone metastasis   • Degenerative disk disease   • Generalized anxiety disorder   • Generalized osteoarthritis   • Hyperlipidemia   • Iron deficiency anemia secondary to inadequate dietary iron intake   • Macular edema   • Malignant neoplasm of kidney ([facility] [address] )   • Metastasis to brain [facility])   • 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 [facility])   • Malignant neoplasm metastatic to bone [facility])   • Paroxysmal atrial fibrillation ([facility] [address] )   • Personal history of radiation therapy   • Pulmonary nodules   • Benign colonic polyp • Hypothyroidism   • Cholelithiasis   • History of TIA (transient ischemic attack)   • Overweight (BMI 25 0-29  9)      Past Medical and Surgical History:     Past Medical History:   Diagnosis Date   • Abnormal eye finding     last assessed 2015   • Anemia    • Atrial fibrillation ([facility] [address] )     patient denies   • Colon polyp    • Generalized anxiety disorder    • History of kidney cancer    • Hyperlipidemia     no longer   • Hypertension     no longer   • TIA (transient ischemic attack)      Past Surgical History:   Procedure Laterality Date   • COLONOSCOPY N/A 3/10/2017    Procedure: COLONOSCOPY;  Surgeon: Elton Reina MD;  [facility];  Service:    • EXTERNAL EAR SURGERY     • KIDNEY SURGERY     • TIBIA FRACTURE SURGERY     • TONSILLECTOMY  childhood   • TOOTH EXTRACTION  2021      Family History:     Family History   Problem Relation Age of Onset   • No Known Problems Mother    • Cancer Paternal Uncle       Social History:     Social History     Socioeconomic History   • Marital status: /Civil Union     Spouse name: None   • Number of children: None   • Years of education: None   • Highest education level: None   Occupational History   • Occupation: Retired     Employer: OTHER   Tobacco Use   • Smoking status: Former     Types: Cigarettes     Quit date:      Years since quittin 3   • Smokeless tobacco: Never   Vaping Use   • Vaping Use: Never used   Substance and Sexual Activity   • Alcohol use: No     Comment: rarely   • Drug use: Never   • Sexual activity: None   Other Topics Concern   • None   Social History Narrative    Caffeine use- 1/2 caf 1/2 dec  Coffee- 1 1/2 cups     Social Determinants of Health     Financial Resource Strain: Low Risk    • Difficulty of Paying Living Expenses: Not hard at all   Food Insecurity: Not on file   Transportation Needs: No Transportation Needs   • Lack of Transportation (Medical): No   • Lack of Transportation (Non-Medical):  No   Physical Activity: Not on file   Stress: Not on file   Social Connections: Not on file   Intimate Partner Violence: Not on file   Housing Stability: Not on file      Medications and Allergies:     Current Outpatient Medications   Medication Sig Dispense Refill   • Ascorbic Acid (vitamin C) 100 MG tablet Take 100 mg by mouth daily     • aspirin 81 mg chewable tablet Two tabs po q day  30 tablet 0   • Bevacizumab (AVASTIN IV) Infuse into a venous catheter Every 4 weeks      • clopidogrel (Plavix) 75 mg tablet Take 1 tablet (75 mg total) by mouth daily 30 tablet 3   • sertraline (ZOLOFT) 50 mg tablet Take 1 tablet (50 mg total) by mouth daily 90 tablet 3   • tamsulosin (FLOMAX) 0 4 mg Take 1 capsule (0 4 mg total) by mouth daily with dinner 30 capsule 5   • atorvastatin (LIPITOR) 40 mg tablet Take 1 tablet (40 mg total) by mouth every evening (Patient not taking: Reported on 3/4/2022) 30 tablet 0   • montelukast (SINGULAIR) 10 mg tablet  (Patient not taking: Reported on 3/31/2022)       No current facility-administered medications for this visit       Allergies   Allergen Reactions   • Prednisone GI Intolerance      Immunizations:     Immunization History   Administered Date(s) Administered   • COVID-19 PFIZER VACCINE 0 3 ML IM 02/28/2021, 03/22/2021, 11/22/2021   • INFLUENZA 09/29/2014, 10/15/2015, 10/03/2016, 10/02/2017, 10/03/2022   • Influenza Quadrivalent Preservative Free 3 years and older IM 10/15/2015, 10/03/2016   • Influenza Quadrivalent, 6-35 Months IM 10/02/2017   • Influenza, high dose seasonal 0 7 mL 10/22/2020, 09/30/2021, 10/03/2022   • Influenza, recombinant, quadrivalent,injectable, preservative free 09/26/2018, 09/16/2019   • Influenza, seasonal, injectable 10/01/2013, 09/29/2014   • Td (adult), adsorbed 01/01/2004   • Tdap 08/18/2015      Health Maintenance:         Topic Date Due   • Colorectal Cancer Screening  09/11/2032   • Hepatitis C Screening  Completed         Topic Date Due   • Pneumococcal Vaccine: 65+ Years (1 - PCV) Never done   • COVID-19 Vaccine (4 - Booster for Pfizer series) 01/17/2022      Medicare Screening Tests and Risk Assessments:     Suzy Rincon is here for his Subsequent Wellness visit  Last Medicare Wellness visit information reviewed, patient interviewed and updates made to the record today  Health Risk Assessment:   Patient rates overall health as very good  Patient feels that their physical health rating is same  Patient is very satisfied with their life  Eyesight was rated as same  Hearing was rated as same  Patient feels that their emotional and mental health rating is same  Patients states they are never, rarely angry  Patient states they are sometimes unusually tired/fatigued  Pain experienced in the last 7 days has been none  Patient states that he has experienced no weight loss or gain in last 6 months  Fall Risk Screening: In the past year, patient has experienced: history of falling in past year    Number of falls: 2 or more  Injured during fall?: Yes    Feels unsteady when standing or walking?: No    Worried about falling?: No      Home Safety:  Patient does not have trouble with stairs inside or outside of their home  Patient has working smoke alarms and has working carbon monoxide detector  Home safety hazards include: none  Nutrition:   Current diet is Regular  Medications:   Patient is currently taking over-the-counter supplements  OTC medications include: see medication list  Patient is able to manage medications  Activities of Daily Living (ADLs)/Instrumental Activities of Daily Living (IADLs):   Walk and transfer into and out of bed and chair?: Yes  Dress and groom yourself?: Yes    Bathe or shower yourself?: Yes    Feed yourself?  Yes  Do your laundry/housekeeping?: Yes  Manage your money, pay your bills and track your expenses?: Yes  Make your own meals?: Yes    Do your own shopping?: Yes    Previous Hospitalizations:   Any hospitalizations or ED visits within the last 12 months?: Yes    How many hospitalizations have you had in the last year?: 1-2    Advance Care Planning:   Living will: Yes    Durable POA for healthcare: Yes    Advanced directive: Yes    Advanced directive counseling given: Yes    Five wishes given: No    Patient declined ACP directive: No    End of Life Decisions reviewed with patient: Yes    Provider agrees with end of life decisions: Yes      Cognitive Screening:   Provider or family/friend/caregiver concerned regarding cognition?: No    PREVENTIVE SCREENINGS      Cardiovascular Screening:    General: Screening Not Indicated, History Lipid Disorder and Screening Current      Diabetes Screening:     General: Screening Current      Colorectal Cancer Screening:     General: Screening Current      Prostate Cancer Screening:    General: Screening Current      Osteoporosis Screening:    General: Screening Not Indicated      Abdominal Aortic Aneurysm (AAA) Screening:    Risk factors include: age between 73-69 yo and tobacco use        General: Screening Not Indicated      Lung Cancer Screening:     General: Screening Not Indicated and History Lung Cancer      Hepatitis C Screening:    General: Screening Current    Screening, Brief Intervention, and Referral to Treatment (SBIRT)    Screening  Typical number of drinks in a day: 0  Typical number of drinks in a week: 0  Interpretation: Low risk drinking behavior  Single Item Drug Screening:  How often have you used an illegal drug (including marijuana) or a prescription medication for non-medical reasons in the past year? never    Single Item Drug Screen Score: 0  Interpretation: Negative screen for possible drug use disorder    Other Counseling Topics:   Car/seat belt/driving safety, sunscreen and calcium and vitamin D intake and regular weightbearing exercise  No results found       Physical Exam:     /62 (BP Location: Left arm, Patient Position: Sitting, Cuff Size: Standard)   Pulse 61   Temp "(!) 97 °F (36 1 °C)   Ht 5' 8\" (1 727 m)   Wt 78 9 kg (174 lb)   SpO2 98%   BMI 26 46 kg/m²     Physical Exam   A/P: Doing well and no falls reported  Denies depression and feels safe at home  Diverse diet  No problems operating a MV and uses seat belts  Has a living will and POA  No DME or referrals needed today  RTC one year for medicare wellness       Jose M Espino, DO    "

## 2023-04-24 NOTE — PATIENT INSTRUCTIONS
Weight Management   AMBULATORY CARE:   Why it is important to manage your weight:  Being overweight increases your risk of health conditions such as heart disease, high blood pressure, type 2 diabetes, and certain types of cancer  It can also increase your risk for osteoarthritis, sleep apnea, and other respiratory problems  Aim for a slow, steady weight loss  Even a small amount of weight loss can lower your risk of health problems  Risks of being overweight:  Extra weight can cause many health problems, including the following:  • Diabetes (high blood sugar level)    • High blood pressure or high cholesterol    • Heart disease    • Stroke    • Gallbladder or liver disease    • Cancer of the colon, breast, prostate, liver, or kidney    • Sleep apnea    • Arthritis or gout    Screening  is done to check for health conditions before you have signs or symptoms  If you are 28to 79years old, your blood sugar level may be checked every 3 years for signs of prediabetes or diabetes  Your healthcare provider will check your blood pressure at each visit  High blood pressure can lead to a stroke or other problems  Your provider may check for signs of heart disease, cancer, or other health problems  How to lose weight safely:  A safe and healthy way to lose weight is to eat fewer calories and get regular exercise  • You can lose up about 1 pound a week by decreasing the number of calories you eat by 500 calories each day  You can decrease calories by eating smaller portion sizes or by cutting out high-calorie foods  Read labels to find out how many calories are in the foods you eat  • You can also burn calories with exercise such as walking, swimming, or biking  You will be more likely to keep weight off if you make these changes part of your lifestyle  Exercise at least 30 minutes per day on most days of the week   You can also fit in more physical activity by taking the stairs instead of the elevator or parking farther away from stores  Ask your healthcare provider about the best exercise plan for you  Healthy meal plan for weight management:  A healthy meal plan includes a variety of foods, contains fewer calories, and helps you stay healthy  A healthy meal plan includes the following:     • Eat whole-grain foods more often  A healthy meal plan should contain fiber  Fiber is the part of grains, fruits, and vegetables that is not broken down by your body  Whole-grain foods are healthy and provide extra fiber in your diet  Some examples of whole-grain foods are whole-wheat breads and pastas, oatmeal, brown rice, and bulgur  • Eat a variety of vegetables every day  Include dark, leafy greens such as spinach, kale, lyndsay greens, and mustard greens  Eat yellow and orange vegetables such as carrots, sweet potatoes, and winter squash  • Eat a variety of fruits every day  Choose fresh or canned fruit (canned in its own juice or light syrup) instead of juice  Fruit juice has very little or no fiber  • Eat low-fat dairy foods  Drink fat-free (skim) milk or 1% milk  Eat fat-free yogurt and low-fat cottage cheese  Try low-fat cheeses such as mozzarella and other reduced-fat cheeses  • Choose meat and other protein foods that are low in fat  Choose beans or other legumes such as split peas or lentils  Choose fish, skinless poultry (chicken or turkey), or lean cuts of red meat (beef or pork)  Before you cook meat or poultry, cut off any visible fat  • Use less fat and oil  Try baking foods instead of frying them  Add less fat, such as margarine, sour cream, regular salad dressing and mayonnaise to foods  Eat fewer high-fat foods  Some examples of high-fat foods include french fries, doughnuts, ice cream, and cakes  • Eat fewer sweets  Limit foods and drinks that are high in sugar  This includes candy, cookies, regular soda, and sweetened drinks  Ways to decrease calories:   • Eat smaller portions  ? Use a small plate with smaller servings  ? Do not eat second helpings  ? When you eat at a restaurant, ask for a box and place half of your meal in the box before you eat  ? Share an entrée with someone else  • Replace high-calorie snacks with healthy, low-calorie snacks  ? Choose fresh fruit, vegetables, fat-free rice cakes, or air-popped popcorn instead of potato chips, nuts, or chocolate  ? Choose water or calorie-free drinks instead of soda or sweetened drinks  • Do not shop for groceries when you are hungry  You may be more likely to make unhealthy food choices  Take a grocery list of healthy foods and shop after you have eaten  • Eat regular meals  Do not skip meals  Skipping meals can lead to overeating later in the day  This can make it harder for you to lose weight  Eat a healthy snack in place of a meal if you do not have time to eat a regular meal  Talk with a dietitian to help you create a meal plan and schedule that is right for you  Other things to consider as you try to lose weight:   • Be aware of situations that may give you the urge to overeat, such as eating while watching television  Find ways to avoid these situations  For example, read a book, go for a walk, or do crafts  • Meet with a weight loss support group or friends who are also trying to lose weight  This may help you stay motivated to continue working on your weight loss goals  © Copyright [employer] 2022 Information is for End User's use only and may not be sold, redistributed or otherwise used for commercial purposes  The above information is an  only  It is not intended as medical advice for individual conditions or treatments  Talk to your doctor, nurse or pharmacist before following any medical regimen to see if it is safe and effective for you  Low Fat Diet   AMBULATORY CARE:   A low-fat diet  is an eating plan that is low in total fat, unhealthy fat, and cholesterol   You may need to follow a low-fat diet if you have trouble digesting or absorbing fat  You may also need to follow this diet if you have high cholesterol  You can also lower your cholesterol by increasing the amount of fiber in your diet  Soluble fiber is a type of fiber that helps to decrease cholesterol levels  Different types of fat in food:   • Limit unhealthy fats  A diet that is high in cholesterol, saturated fat, and trans fat may cause unhealthy cholesterol levels  Unhealthy cholesterol levels increase your risk of heart disease  ? Cholesterol:  Limit intake of cholesterol to less than 200 mg per day  Cholesterol is found in meat, eggs, and dairy  ? Saturated fat:  Limit saturated fat to less than 7% of your total daily calories  Ask your dietitian how many calories you need each day  Saturated fat is found in butter, cheese, ice cream, whole milk, and palm oil  Saturated fat is also found in meat, such as beef, pork, chicken skin, and processed meats  Processed meats include sausage, hot dogs, and bologna  ? Trans fat:  Avoid trans fat as much as possible  Trans fat is used in fried and baked foods  Foods that say trans fat free on the label may still have up to 0 5 grams of trans fat per serving  • Include healthy fats  Replace foods that are high in saturated and trans fat with foods high in healthy fats  This may help to decrease high cholesterol levels  ? Monounsaturated fats: These are found in avocados, nuts, and vegetable oils, such as olive, canola, and sunflower oil  ? Polyunsaturated fats: These can be found in vegetable oils, such as soybean or corn oil  Omega-3 fats can help to decrease the risk of heart disease  Omega-3 fats are found in fish, such as salmon, herring, trout, and tuna  Omega-3 fats can also be found in plant foods, such as walnuts, flaxseed, soybeans, and canola oil  Foods to limit or avoid:   • Grains:      ?  Snacks that are made with partially hydrogenated oils, such as chips, regular crackers, and butter-flavored popcorn    ? High-fat baked goods, such as biscuits, croissants, doughnuts, pies, cookies, and pastries    • Dairy:      ? Whole milk, 2% milk, and yogurt and ice cream made with whole milk    ? Half and half creamer, heavy cream, and whipping cream    ? Cheese, cream cheese, and sour cream    • Meats and proteins:      ? High-fat cuts of meat (T-bone steak, regular hamburger, and ribs)    ? Fried meat, poultry (turkey and chicken), and fish    ? Poultry (chicken and turkey) with skin    ? Cold cuts (salami or bologna), hot dogs, mejia, and sausage    ? Whole eggs and egg yolks    • Vegetables and fruits with added fat:      ? Fried vegetables or vegetables in butter or high-fat sauces, such as cream or cheese sauces    ? Fried fruit or fruit served with butter or cream    • Fats:      ? Butter, stick margarine, and shortening    ? Coconut, palm oil, and palm kernel oil    Foods to include:       • Grains:      ? Whole-grain breads, cereals, pasta, and brown rice    ? Low-fat crackers and pretzels    • Vegetables and fruits:      ? Fresh, frozen, or canned vegetables (no salt or low-sodium)    ? Fresh, frozen, dried, or canned fruit (canned in light syrup or fruit juice)    ? Avocado    • Low-fat dairy products:      ? Nonfat (skim) or 1% milk    ? Nonfat or low-fat cheese, yogurt, and cottage cheese    • Meats and proteins:      ? Chicken or turkey with no skin    ? Baked or broiled fish    ? Lean beef and pork (loin, round, extra lean hamburger)    ? Beans and peas, unsalted nuts, soy products    ? Egg whites and substitutes    ? Seeds and nuts    • Fats:      ? Unsaturated oil, such as canola, olive, peanut, soybean, or sunflower oil    ? Soft or liquid margarine and vegetable oil spread    ? Low-fat salad dressing  Other ways to decrease fat:   • Read food labels before you buy foods    Choose foods that have less than 30% of calories from fat  Choose low-fat or fat-free dairy products  Remember that fat free does not mean calorie free  These foods still contain calories, and too many calories can lead to weight gain  • Trim fat from meat and avoid fried food  Trim all visible fat from meat before you cook it  Remove the skin from poultry  Do not abel meat, fish, or poultry  Bake, roast, boil, or broil these foods instead  Avoid fried foods  Eat a baked potato instead of [place] fries  Steam vegetables instead of sautéing them in butter  • Add less fat to foods  Use imitation mejia bits on salads and baked potatoes instead of regular mejia bits  Use fat-free or low-fat salad dressings instead of regular dressings  Use low-fat or nonfat butter-flavored topping instead of regular butter or margarine on popcorn and other foods  Ways to decrease fat in recipes:  Replace high-fat ingredients with low-fat or nonfat ones  This may cause baked goods to be drier than usual  You may need to use nonfat cooking spray on pans to prevent food from sticking  You also may need to change the amount of other ingredients, such as water, in the recipe  Try the following:  • Use low-fat or light margarine instead of regular margarine or shortening  • Use lean ground turkey breast or chicken, or lean ground beef (less than 5% fat) instead of hamburger  • Add 1 teaspoon of canola oil to 8 ounces of skim milk instead of using cream or half and half  • Use grated zucchini, carrots, or apples in breads instead of coconut  • Use blenderized, low-fat cottage cheese, plain tofu, or low-fat ricotta cheese instead of cream cheese  • Use 1 egg white and 1 teaspoon of canola oil, or use ¼ cup (2 ounces) of fat-free egg substitute instead of a whole egg  • Replace half of the oil that is called for in a recipe with applesauce when you bake  Use 3 tablespoons of cocoa powder and 1 tablespoon of canola oil instead of a square of baking chocolate      How to increase fiber:  Eat enough high-fiber foods to get 20 to 30 grams of fiber every day  Slowly increase your fiber intake to avoid stomach cramps, gas, and other problems  • Eat 3 ounces of whole-grain foods each day  An ounce is about 1 slice of bread  Eat whole-grain breads, such as whole-wheat bread  Whole wheat, whole-wheat flour, or other whole grains should be listed as the first ingredient on the food label  Replace white flour with whole-grain flour or use half of each in recipes  Whole-grain flour is heavier than white flour, so you may have to add more yeast or baking powder  • Eat a high-fiber cereal for breakfast   Oatmeal is a good source of soluble fiber  Look for cereals that have bran or fiber in the name  Choose whole-grain products, such as brown rice, barley, and whole-wheat pasta  • Eat more beans, peas, and lentils  For example, add beans to soups or salads  Eat at least 5 cups of fruits and vegetables each day  Eat fruits and vegetables with the peel because the peel is high in fiber  © Copyright [employer] 2022 Information is for End User's use only and may not be sold, redistributed or otherwise used for commercial purposes  The above information is an  only  It is not intended as medical advice for individual conditions or treatments  Talk to your doctor, nurse or pharmacist before following any medical regimen to see if it is safe and effective for you  Heart Healthy Diet   AMBULATORY CARE:   A heart healthy diet  is an eating plan low in unhealthy fats and sodium (salt)  The plan is high in healthy fats and fiber  A heart healthy diet helps improve your cholesterol levels and lowers your risk for heart disease and stroke  A dietitian will teach you how to read and understand food labels  Heart healthy diet guidelines to follow:   • Choose foods that contain healthy fats:      ? Unsaturated fats  include monounsaturated and polyunsaturated fats  Unsaturated fat is found in foods such as soybean, canola, olive, corn, and safflower oils  It is also found in soft tub margarine that is made with liquid vegetable oil  ? Omega-3 fat  is found in certain fish, such as salmon, tuna, and trout, and in walnuts and flaxseed  Eat fish high in omega-3 fats at least 2 times a week  • Limit or do not have unhealthy fats:      ? Cholesterol  is found in animal foods, such as eggs and lobster, and in dairy products made from whole milk  Limit cholesterol to less than 200 mg each day  ? Saturated fat  is found in meats, such as mejia and hamburger  It is also found in chicken or turkey skin, whole milk, and butter  Limit saturated fat to less than 7% of your total daily calories  ? Trans fat  is found in packaged foods, such as potato chips and cookies  It is also in hard margarine, some fried foods, and shortening  Do not eat foods that contain trans fats  • Get 20 to 30 grams of fiber each day  Fruits, vegetables, whole-grain foods, and legumes (cooked beans) are good sources of fiber  • Limit sodium as directed  You may be told to limit sodium, such as to 2,000 mg or less each day  Choose low-sodium or no-salt-added foods  Add little or no salt to food you prepare  Use herbs and spices in place of salt  Include the following in your heart healthy plan:  Ask your dietitian or healthcare provider how many servings to have each day from the following food groups:  • Grains:      ? Whole-wheat breads, cereals, and pastas, and brown rice    ? Low-fat, low-sodium crackers and chips    • Vegetables:      ? Broccoli, green beans, green peas, and spinach    ? Collards, kale, and lima beans    ? Carrots, sweet potatoes, tomatoes, and peppers    ? Canned vegetables with no salt added    • Fruits:      ? Bananas, peaches, pears, and pineapple    ? Grapes, raisins, and dates    ?  Oranges, tangerines, grapefruit, orange juice, and grapefruit juice    ? Apricots, mangoes, melons, and papaya    ? Raspberries and strawberries    ? Canned fruit with no added sugar    • Low-fat dairy:      ? Nonfat (skim) milk, 1% milk, and low-fat almond, cashew, or soy milks fortified with calcium    ? Low-fat cheese, regular or frozen yogurt, and cottage cheese    • Meats and proteins:      ? Lean cuts of beef and pork (loin, leg, round), skinless chicken and turkey    ? Legumes, soy products, egg whites, or nuts    Limit or do not include the following in your heart healthy plan:   • Foods and liquids that contain unhealthy fats and oils:      ? Whole or 2% milk, cream cheese, sour cream, or cheese    ? High-fat cuts of beef (T-bone steaks, ribs), chicken or turkey with skin, and organ meats such as liver    ? Butter, stick margarine, shortening, and cooking oils such as coconut or palm oil    • Foods and liquids high in sodium:      ? Packaged foods, such as frozen dinners, cookies, macaroni and cheese, and cereals with more than 300 mg of sodium per serving    ? Vegetables with added sodium, such as instant potatoes, vegetables with added sauces, or regular canned vegetables    ? Cured or smoked meats, such as hot dogs, mejia, and sausage    ? High-sodium ketchup, barbecue sauce, salad dressing, pickles, olives, soy sauce, or miso    • Foods and liquids high in sugar:      ? Candy, cake, cookies, pies, or doughnuts    ? Soft drinks (soda), sports drinks, or sweetened tea    ? Canned or dry mixes for cakes, soups, sauces, or gravies    Other healthy heart guidelines:   • Do not smoke  Nicotine and other chemicals in cigarettes and cigars can cause lung and heart damage  Ask your healthcare provider for information if you currently smoke and need help to quit  E-cigarettes or smokeless tobacco still contain nicotine  Talk to your provider before you use these products  • Limit or do not drink alcohol as directed    Alcohol can damage your heart and raise your blood pressure  Your healthcare provider may give you specific daily and weekly limits  The general recommended limit is 1 drink a day for women 21 or older and for men 72 or older  Do not have more than 3 drinks within 24 hours or 7 within a week  The recommended limit is 2 drinks a day for men 24to 59years of age  Do not have more than 4 drinks within 24 hours or 14 within a week  A drink of alcohol is 12 ounces of beer, 5 ounces of wine, or 1½ ounces of liquor  • Maintain a healthy weight  Extra body weight makes your heart work harder  Ask your provider what a healthy weight is for you  He or she can help you create a safe weight loss plan, if needed  • Exercise regularly  Exercise can help you maintain a healthy weight and improve your blood pressure and cholesterol levels  Regular exercise can also decrease your risk for heart problems  Ask your provider about the best exercise plan for you  Do not start an exercise program without asking your provider  Follow up with your doctor or cardiologist as directed:  Write down your questions so you remember to ask them during your visits  © Copyright [employer] 2022 Information is for End User's use only and may not be sold, redistributed or otherwise used for commercial purposes  The above information is an  only  It is not intended as medical advice for individual conditions or treatments  Talk to your doctor, nurse or pharmacist before following any medical regimen to see if it is safe and effective for you  Medicare Preventive Visit Patient Instructions  Thank you for completing your Welcome to Medicare Visit or Medicare Annual Wellness Visit today  Your next wellness visit will be due in one year (4/24/2024)  The screening/preventive services that you may require over the next 5-10 years are detailed below  Some tests may not apply to you based off risk factors and/or age   Screening tests ordered at today's visit but not completed yet may show as past due  Also, please note that scanned in results may not display below  Preventive Screenings:  Service Recommendations Previous Testing/Comments   Colorectal Cancer Screening  · Colonoscopy    · Fecal Occult Blood Test (FOBT)/Fecal Immunochemical Test (FIT)  · Fecal DNA/Cologuard Test  · Flexible Sigmoidoscopy Age: 39-70 years old   Colonoscopy: every 10 years (May be performed more frequently if at higher risk)  OR  FOBT/FIT: every 1 year  OR  Cologuard: every 3 years  OR  Sigmoidoscopy: every 5 years  Screening may be recommended earlier than age 39 if at higher risk for colorectal cancer  Also, an individualized decision between you and your healthcare provider will decide whether screening between the ages of 74-80 would be appropriate  Colonoscopy: 09/14/2022  FOBT/FIT: Not on file  Cologuard: Not on file  Sigmoidoscopy: Not on file    Screening Current     Prostate Cancer Screening Individualized decision between patient and health care provider in men between ages of 53-78   Medicare will cover every 12 months beginning on the day after your 50th birthday PSA: 0 9 ng/mL     Screening Current     Hepatitis C Screening Once for adults born between 1945 and 1965  More frequently in patients at high risk for Hepatitis C Hep C Antibody: 09/26/2018    Screening Current   Diabetes Screening 1-2 times per year if you're at risk for diabetes or have pre-diabetes Fasting glucose: 87 mg/dL (1/3/2023)  A1C: 5 1 % (1/3/2023)  Screening Current   Cholesterol Screening Once every 5 years if you don't have a lipid disorder  May order more often based on risk factors  Lipid panel: 01/11/2022  Screening Not Indicated  History Lipid Disorder      Other Preventive Screenings Covered by Medicare:  1  Abdominal Aortic Aneurysm (AAA) Screening: covered once if your at risk   You're considered to be at risk if you have a family history of AAA or a male between the age of 73-68 who smoking at least 100 cigarettes in your lifetime  2  Lung Cancer Screening: covers low dose CT scan once per year if you meet all of the following conditions: (1) Age 50-69; (2) No signs or symptoms of lung cancer; (3) Current smoker or have quit smoking within the last 15 years; (4) You have a tobacco smoking history of at least 20 pack years (packs per day x number of years you smoked); (5) You get a written order from a healthcare provider  3  Glaucoma Screening: covered annually if you're considered high risk: (1) You have diabetes OR (2) Family history of glaucoma OR (3)  aged 48 and older OR (3)  American aged 72 and older  3  Osteoporosis Screening: covered every 2 years if you meet one of the following conditions: (1) Have a vertebral abnormality; (2) On glucocorticoid therapy for more than 3 months; (3) Have primary hyperparathyroidism; (4) On osteoporosis medications and need to assess response to drug therapy  5  HIV Screening: covered annually if you're between the age of 12-76  Also covered annually if you are younger than 13 and older than 72 with risk factors for HIV infection  For pregnant patients, it is covered up to 3 times per pregnancy      Immunizations:  Immunization Recommendations   Influenza Vaccine Annual influenza vaccination during flu season is recommended for all persons aged >= 6 months who do not have contraindications   Pneumococcal Vaccine   * Pneumococcal conjugate vaccine = PCV13 (Prevnar 13), PCV15 (Vaxneuvance), PCV20 (Prevnar 20)  * Pneumococcal polysaccharide vaccine = PPSV23 (Pneumovax) Adults 25-60 years old: 1-3 doses may be recommended based on certain risk factors  Adults 72 years old: 1-2 doses may be recommended based off what pneumonia vaccine you previously received   Hepatitis B Vaccine 3 dose series if at intermediate or high risk (ex: diabetes, end stage renal disease, liver disease)   Tetanus (Td) Vaccine - COST NOT COVERED BY MEDICARE PART B Following completion of primary series, a booster dose should be given every 10 years to maintain immunity against tetanus  Td may also be given as tetanus wound prophylaxis  Tdap Vaccine - COST NOT COVERED BY MEDICARE PART B Recommended at least once for all adults  For pregnant patients, recommended with each pregnancy  Shingles Vaccine (Shingrix) - COST NOT COVERED BY MEDICARE PART B  2 shot series recommended in those aged 48 and above     Health Maintenance Due:      Topic Date Due   • Colorectal Cancer Screening  09/11/2032   • Hepatitis C Screening  Completed     Immunizations Due:      Topic Date Due   • Pneumococcal Vaccine: 65+ Years (1 - PCV) Never done   • COVID-19 Vaccine (4 - Booster for [place] Peter series) 01/17/2022     Advance Directives   What are advance directives? Advance directives are legal documents that state your wishes and plans for medical care  These plans are made ahead of time in case you lose your ability to make decisions for yourself  Advance directives can apply to any medical decision, such as the treatments you want, and if you want to donate organs  What are the types of advance directives? There are many types of advance directives, and each state has rules about how to use them  You may choose a combination of any of the following:  · Living will: This is a written record of the treatment you want  You can also choose which treatments you do not want, which to limit, and which to stop at a certain time  This includes surgery, medicine, IV fluid, and tube feedings  · Durable power of  for healthcare [facility]): This is a written record that states who you want to make healthcare choices for you when you are unable to make them for yourself  This person, called a proxy, is usually a family member or a friend  You may choose more than 1 proxy  · Do not resuscitate (DNR) order:  A DNR order is used in case your heart stops beating or you stop breathing   It is a request not to have certain forms of treatment, such as CPR  A DNR order may be included in other types of advance directives  · Medical directive: This covers the care that you want if you are in a coma, near death, or unable to make decisions for yourself  You can list the treatments you want for each condition  Treatment may include pain medicine, surgery, blood transfusions, dialysis, IV or tube feedings, and a ventilator (breathing machine)  · Values history: This document has questions about your views, beliefs, and how you feel and think about life  This information can help others choose the care that you would choose  Why are advance directives important? An advance directive helps you control your care  Although spoken wishes may be used, it is better to have your wishes written down  Spoken wishes can be misunderstood, or not followed  Treatments may be given even if you do not want them  An advance directive may make it easier for your family to make difficult choices about your care  Fall Prevention    Fall prevention  includes ways to make your home and other areas safer  It also includes ways you can move more carefully to prevent a fall  Health conditions that cause changes in your blood pressure, vision, or muscle strength and coordination may increase your risk for falls  Medicines may also increase your risk for falls if they make you dizzy, weak, or sleepy  Fall prevention tips:   · Stand or sit up slowly  · Use assistive devices as directed  · Wear shoes that fit well and have soles that   · Wear a personal alarm  · Stay active  · Manage your medical conditions  Home Safety Tips:  · Add items to prevent falls in the bathroom  · Keep paths clear  · Install bright lights in your home  · Keep items you use often on shelves within reach  · Paint or place reflective tape on the edges of your stairs      Weight Management   Why it is important to manage your weight:  Being overweight increases your risk of health conditions such as heart disease, high blood pressure, type 2 diabetes, and certain types of cancer  It can also increase your risk for osteoarthritis, sleep apnea, and other respiratory problems  Aim for a slow, steady weight loss  Even a small amount of weight loss can lower your risk of health problems  How to lose weight safely:  A safe and healthy way to lose weight is to eat fewer calories and get regular exercise  You can lose up about 1 pound a week by decreasing the number of calories you eat by 500 calories each day  Healthy meal plan for weight management:  A healthy meal plan includes a variety of foods, contains fewer calories, and helps you stay healthy  A healthy meal plan includes the following:  · Eat whole-grain foods more often  A healthy meal plan should contain fiber  Fiber is the part of grains, fruits, and vegetables that is not broken down by your body  Whole-grain foods are healthy and provide extra fiber in your diet  Some examples of whole-grain foods are whole-wheat breads and pastas, oatmeal, brown rice, and bulgur  · Eat a variety of vegetables every day  Include dark, leafy greens such as spinach, kale, lyndsay greens, and mustard greens  Eat yellow and orange vegetables such as carrots, sweet potatoes, and winter squash  · Eat a variety of fruits every day  Choose fresh or canned fruit (canned in its own juice or light syrup) instead of juice  Fruit juice has very little or no fiber  · Eat low-fat dairy foods  Drink fat-free (skim) milk or 1% milk  Eat fat-free yogurt and low-fat cottage cheese  Try low-fat cheeses such as mozzarella and other reduced-fat cheeses  · Choose meat and other protein foods that are low in fat  Choose beans or other legumes such as split peas or lentils  Choose fish, skinless poultry (chicken or turkey), or lean cuts of red meat (beef or pork)  Before you cook meat or poultry, cut off any visible fat     · Use less fat and oil  Try baking foods instead of frying them  Add less fat, such as margarine, sour cream, regular salad dressing and mayonnaise to foods  Eat fewer high-fat foods  Some examples of high-fat foods include french fries, doughnuts, ice cream, and cakes  · Eat fewer sweets  Limit foods and drinks that are high in sugar  This includes candy, cookies, regular soda, and sweetened drinks  Exercise:  Exercise at least 30 minutes per day on most days of the week  Some examples of exercise include walking, biking, dancing, and swimming  You can also fit in more physical activity by taking the stairs instead of the elevator or parking farther away from stores  Ask your healthcare provider about the best exercise plan for you  © Copyright [employer] 2018 Information is for End User's use only and may not be sold, redistributed or otherwise used for commercial purposes   All illustrations and images included in CareNotes® are the copyrighted property of A D [employer]  or [address] [place] [address]

## 2023-04-24 NOTE — PROGRESS NOTES
BMI Counseling: There is no height or weight on file to calculate BMI  The BMI is above normal  Nutrition recommendations include decreasing portion sizes and encouraging healthy choices of fruits and vegetables  Exercise recommendations include moderate physical activity 150 minutes/week  No pharmacotherapy was ordered  Rationale for BMI follow-up plan is due to patient being overweight or obese  Assessment/Plan:  Problem List Items Addressed This Visit        Endocrine    Hypothyroidism       Cardiovascular and Mediastinum    Paroxysmal atrial fibrillation (HCC)    Benign essential hypertension - Primary    Relevant Orders    Basic metabolic panel       Musculoskeletal and Integument    Generalized osteoarthritis       Genitourinary    Malignant neoplasm of kidney ([address] )       Other    Overweight (BMI 25 0-29  9)    Iron deficiency anemia secondary to inadequate dietary iron intake    Hyperlipidemia    History of TIA (transient ischemic attack)    Generalized anxiety disorder   Other Visit Diagnoses     Medicare annual wellness visit, subsequent        Benign prostatic hyperplasia with nocturia        Relevant Medications    tamsulosin (FLOMAX) 0 4 mg           Diagnoses and all orders for this visit:    Benign essential hypertension  -     Basic metabolic panel; Future    Acquired hypothyroidism    Paroxysmal atrial fibrillation (HCC)    Generalized osteoarthritis    Malignant neoplasm of kidney, unspecified laterality (HCC)    Mixed hyperlipidemia    History of TIA (transient ischemic attack)    Generalized anxiety disorder    Iron deficiency anemia secondary to inadequate dietary iron intake    Medicare annual wellness visit, subsequent    Overweight (BMI 25 0-29  9)    Benign prostatic hyperplasia with nocturia  -     tamsulosin (FLOMAX) 0 4 mg; Take 1 capsule (0 4 mg total) by mouth daily with dinner      No problem-specific Assessment & Plan notes found for this encounter      A/P: Doing well and will check labs  Discussed BMI and will give info on diet and exercise  Will try flomax for the BPH and nocturia  Appreciate specialists input  Continue current treatment and RTC four months for routine  Keep f/u with the specialist      Subjective:      Patient ID: Ameena Amezquita is a 79 y o  male  WM RTC for f/u HTN, HLD, etc  Doing ok and no new issues,but nocturia continues and is ready to try flomax    Remains active w/o difficulty and no falls  Denies CP, SOB,palpitations, edema, orthopnea, or PND  No stroke like events  Renal Ca is in remission  Vision no worse and continue with eye injections  EMBER is controlled  Due for labs for f/u elevated potassium  The following portions of the patient's history were reviewed and updated as appropriate:   He has a past medical history of Abnormal eye finding, Anemia, Atrial fibrillation ([address] ), Colon polyp, Generalized anxiety disorder, History of kidney cancer, Hyperlipidemia, Hypertension, and TIA (transient ischemic attack)  ,  does not have any pertinent problems on file  ,   has a past surgical history that includes External ear surgery; Kidney surgery; Tibia fracture surgery; Colonoscopy (N/A, 3/10/2017); Tonsillectomy (childhood); and Tooth extraction (08/11/2021)  ,  family history includes Cancer in his paternal uncle; No Known Problems in his mother  ,   reports that he quit smoking about 24 years ago  His smoking use included cigarettes  He has never used smokeless tobacco  He reports that he does not drink alcohol and does not use drugs  ,  is allergic to prednisone     Current Outpatient Medications   Medication Sig Dispense Refill   • Ascorbic Acid (vitamin C) 100 MG tablet Take 100 mg by mouth daily     • aspirin 81 mg chewable tablet Two tabs po q day   30 tablet 0   • Bevacizumab (AVASTIN IV) Infuse into a venous catheter Every 4 weeks      • clopidogrel (Plavix) 75 mg tablet Take 1 tablet (75 mg total) by mouth daily 30 tablet 3   • sertraline (ZOLOFT) 50 mg "tablet Take 1 tablet (50 mg total) by mouth daily 90 tablet 3   • tamsulosin (FLOMAX) 0 4 mg Take 1 capsule (0 4 mg total) by mouth daily with dinner 30 capsule 5   • atorvastatin (LIPITOR) 40 mg tablet Take 1 tablet (40 mg total) by mouth every evening (Patient not taking: Reported on 3/4/2022) 30 tablet 0   • montelukast (SINGULAIR) 10 mg tablet  (Patient not taking: Reported on 3/31/2022)       No current facility-administered medications for this visit  Review of Systems   Constitutional: Negative for activity change, chills, diaphoresis, fatigue and fever  HENT: Negative  Eyes: Positive for visual disturbance  Respiratory: Negative for cough, chest tightness, shortness of breath and wheezing  Cardiovascular: Negative for chest pain, palpitations and leg swelling  Gastrointestinal: Negative for abdominal pain, constipation, diarrhea, nausea and vomiting  Endocrine: Negative for cold intolerance and heat intolerance  Genitourinary: Negative for difficulty urinating, dysuria and frequency  Nocturia   Musculoskeletal: Negative for arthralgias, gait problem and myalgias  Neurological: Negative for dizziness, seizures, syncope, weakness, light-headedness and headaches  Psychiatric/Behavioral: Negative for confusion and sleep disturbance  PHQ-2/9 Depression Screening          Objective:  Vitals:    04/24/23 1023   BP: 114/62   BP Location: Left arm   Patient Position: Sitting   Cuff Size: Standard   Pulse: 61   Temp: (!) 97 °F (36 1 °C)   SpO2: 98%   Weight: 78 9 kg (174 lb)   Height: 5' 8\" (1 727 m)     Body mass index is 26 46 kg/m²  Physical Exam  Vitals and nursing note reviewed  Constitutional:       General: He is not in acute distress  Appearance: Normal appearance  He is not ill-appearing  HENT:      Head: Normocephalic and atraumatic  Mouth/Throat:      Mouth: Mucous membranes are moist    Eyes:      Extraocular Movements: Extraocular movements intact   " Conjunctiva/sclera: Conjunctivae normal       Pupils: Pupils are equal, round, and reactive to light  Neck:      Vascular: No carotid bruit  Cardiovascular:      Rate and Rhythm: Normal rate and regular rhythm  Heart sounds: Normal heart sounds  No murmur heard  Pulmonary:      Effort: Pulmonary effort is normal  No respiratory distress  Breath sounds: Normal breath sounds  No wheezing, rhonchi or rales  Abdominal:      General: Bowel sounds are normal  There is no distension  Palpations: Abdomen is soft  Tenderness: There is no abdominal tenderness  Musculoskeletal:      Cervical back: Neck supple  Right lower leg: No edema  Left lower leg: No edema  Neurological:      General: No focal deficit present  Mental Status: He is alert and oriented to person, place, and time  Mental status is at baseline  Psychiatric:         Mood and Affect: Mood normal          Behavior: Behavior normal          Thought Content: Thought content normal          Judgment: Judgment normal          BMI Counseling: Body mass index is 26 46 kg/m²  The BMI is above normal  Nutrition recommendations include reducing portion sizes, decreasing overall calorie intake, reducing intake of saturated fat and trans fat and reducing intake of cholesterol  Exercise recommendations include moderate aerobic physical activity for 150 minutes/week

## 2023-06-21 DIAGNOSIS — R29.90 STROKE-LIKE SYMPTOMS: ICD-10-CM

## 2023-06-21 RX ORDER — CLOPIDOGREL BISULFATE 75 MG/1
75 TABLET ORAL DAILY
Qty: 90 TABLET | Refills: 1 | Status: SHIPPED | OUTPATIENT
Start: 2023-06-21

## 2023-08-31 ENCOUNTER — OFFICE VISIT (OUTPATIENT)
Dept: INTERNAL MEDICINE CLINIC | Facility: CLINIC | Age: 68
End: 2023-08-31
Payer: MEDICARE

## 2023-08-31 VITALS
BODY MASS INDEX: 26.52 KG/M2 | HEART RATE: 60 BPM | TEMPERATURE: 97.8 F | DIASTOLIC BLOOD PRESSURE: 64 MMHG | SYSTOLIC BLOOD PRESSURE: 134 MMHG | OXYGEN SATURATION: 98 % | WEIGHT: 175 LBS | HEIGHT: 68 IN

## 2023-08-31 DIAGNOSIS — Z23 ENCOUNTER FOR VACCINATION: ICD-10-CM

## 2023-08-31 DIAGNOSIS — I10 BENIGN ESSENTIAL HYPERTENSION: Primary | ICD-10-CM

## 2023-08-31 DIAGNOSIS — Z12.5 SCREENING FOR PROSTATE CANCER: ICD-10-CM

## 2023-08-31 DIAGNOSIS — D50.8 IRON DEFICIENCY ANEMIA SECONDARY TO INADEQUATE DIETARY IRON INTAKE: ICD-10-CM

## 2023-08-31 DIAGNOSIS — E78.2 MIXED HYPERLIPIDEMIA: ICD-10-CM

## 2023-08-31 DIAGNOSIS — I48.0 PAROXYSMAL ATRIAL FIBRILLATION (HCC): ICD-10-CM

## 2023-08-31 DIAGNOSIS — F41.1 GENERALIZED ANXIETY DISORDER: ICD-10-CM

## 2023-08-31 DIAGNOSIS — E03.9 ACQUIRED HYPOTHYROIDISM: ICD-10-CM

## 2023-08-31 DIAGNOSIS — H35.81 MACULAR EDEMA: ICD-10-CM

## 2023-08-31 DIAGNOSIS — C64.9 MALIGNANT NEOPLASM OF KIDNEY, UNSPECIFIED LATERALITY (HCC): ICD-10-CM

## 2023-08-31 DIAGNOSIS — M15.9 GENERALIZED OSTEOARTHRITIS: ICD-10-CM

## 2023-08-31 PROCEDURE — 99214 OFFICE O/P EST MOD 30 MIN: CPT | Performed by: INTERNAL MEDICINE

## 2023-08-31 NOTE — PROGRESS NOTES
Depression Screening and Follow-up Plan: Patient was screened for depression during today's encounter. They screened negative with a PHQ-2 score of 0. Assessment/Plan:  Problem List Items Addressed This Visit        Endocrine    Hypothyroidism       Cardiovascular and Mediastinum    Paroxysmal atrial fibrillation (HCC)    Relevant Orders    TSH, 3rd generation with Free T4 reflex    Benign essential hypertension - Primary    Relevant Orders    Comprehensive metabolic panel    CBC and differential       Musculoskeletal and Integument    Generalized osteoarthritis       Genitourinary    Malignant neoplasm of kidney (HCC)       Other    Macular edema    Iron deficiency anemia secondary to inadequate dietary iron intake    Relevant Orders    TSH, 3rd generation with Free T4 reflex    Iron Panel (Includes Ferritin, Iron Sat%, Iron, and TIBC)    Hyperlipidemia    Relevant Orders    Comprehensive metabolic panel    Lipid Panel with Direct LDL reflex    TSH, 3rd generation with Free T4 reflex    Generalized anxiety disorder   Other Visit Diagnoses     Screening for prostate cancer        Relevant Orders    PSA, Total Screen    Encounter for vaccination               Diagnoses and all orders for this visit:    Benign essential hypertension  -     Comprehensive metabolic panel; Future  -     CBC and differential; Future    Acquired hypothyroidism    Paroxysmal atrial fibrillation (HCC)  -     TSH, 3rd generation with Free T4 reflex; Future    Generalized osteoarthritis    Malignant neoplasm of kidney, unspecified laterality (HCC)    Mixed hyperlipidemia  -     Comprehensive metabolic panel; Future  -     Lipid Panel with Direct LDL reflex; Future  -     TSH, 3rd generation with Free T4 reflex; Future    Generalized anxiety disorder    Iron deficiency anemia secondary to inadequate dietary iron intake  -     TSH, 3rd generation with Free T4 reflex;  Future  -     Iron Panel (Includes Ferritin, Iron Sat%, Iron, and TIBC); Future    Screening for prostate cancer  -     PSA, Total Screen; Future    Macular edema    Encounter for vaccination        No problem-specific Assessment & Plan notes found for this encounter. A/P: Doing ok and will check labs. Discussed vaccines and defers. Finally has an appt with neuro and will get pt his imaging studies for f/u. Continue f/u with specialists. Continue current treatment and RTC four months for routine. Subjective:      Patient ID: Jeri Carlos is a 79 y.o. male. WM RTC for f/u HTN, HLD, etc. Doing ok and no new issues. Remains active w/o difficulty and no falls. Cancer is in remission. Denies CP, SOB, edema, palpitations, edema, orthopnea or PND. VIsion no worse. Chronic pain is manageable. No stroke like events. Due for labs and vaccines. The following portions of the patient's history were reviewed and updated as appropriate:   He has a past medical history of Abnormal eye finding, Anemia, Atrial fibrillation ([address]), Colon polyp, Generalized anxiety disorder, History of kidney cancer, Hyperlipidemia, Hypertension, and TIA (transient ischemic attack). ,  does not have any pertinent problems on file. ,   has a past surgical history that includes External ear surgery; Kidney surgery; Tibia fracture surgery; Colonoscopy (N/A, 3/10/2017); Tonsillectomy (childhood); and Tooth extraction (08/11/2021). ,  family history includes Cancer in his paternal uncle; No Known Problems in his mother. ,   reports that he quit smoking about 24 years ago. His smoking use included cigarettes. He has never been exposed to tobacco smoke. He has never used smokeless tobacco. He reports that he does not drink alcohol and does not use drugs. ,  is allergic to prednisone. .  Current Outpatient Medications   Medication Sig Dispense Refill   • Ascorbic Acid (vitamin C) 100 MG tablet Take 100 mg by mouth daily     • aspirin 81 mg chewable tablet Two tabs po q day.  30 tablet 0   • atorvastatin (LIPITOR) 40 mg tablet Take 1 tablet (40 mg total) by mouth every evening (Patient not taking: Reported on 3/4/2022) 30 tablet 0   • Bevacizumab (AVASTIN IV) Infuse into a venous catheter Every 4 weeks      • clopidogrel (Plavix) 75 mg tablet Take 1 tablet (75 mg total) by mouth daily 90 tablet 1   • montelukast (SINGULAIR) 10 mg tablet  (Patient not taking: Reported on 3/31/2022)     • sertraline (ZOLOFT) 50 mg tablet Take 1 tablet (50 mg total) by mouth daily 90 tablet 3   • tamsulosin (FLOMAX) 0.4 mg Take 1 capsule (0.4 mg total) by mouth daily with dinner 30 capsule 5     No current facility-administered medications for this visit. Review of Systems   Constitutional: Negative for activity change, chills, diaphoresis, fatigue and fever. HENT: Negative. Eyes: Positive for visual disturbance. Respiratory: Negative for cough, chest tightness, shortness of breath and wheezing. Cardiovascular: Negative for chest pain, palpitations and leg swelling. Gastrointestinal: Negative for abdominal pain, constipation, diarrhea, nausea and vomiting. Endocrine: Negative for cold intolerance and heat intolerance. Genitourinary: Negative for difficulty urinating, dysuria and frequency. Musculoskeletal: Negative for arthralgias, gait problem and myalgias. Neurological: Negative for dizziness, seizures, syncope, weakness, light-headedness and headaches. Psychiatric/Behavioral: Negative for confusion, dysphoric mood and sleep disturbance. The patient is not nervous/anxious. PHQ-2/9 Depression Screening    Little interest or pleasure in doing things: 0 - not at all  Feeling down, depressed, or hopeless: 0 - not at all  PHQ-2 Score: 0  PHQ-2 Interpretation: Negative depression screen        Objective:  Vitals:    08/31/23 1049   BP: 134/64   Pulse: 60   Temp: 97.8 °F (36.6 °C)   SpO2: 98%   Weight: 79.4 kg (175 lb)   Height: 5' 8" (1.727 m)     Body mass index is 26.61 kg/m².      Physical Exam  Vitals and nursing note reviewed. Constitutional:       General: He is not in acute distress. Appearance: Normal appearance. He is not ill-appearing. HENT:      Head: Normocephalic and atraumatic. Mouth/Throat:      Mouth: Mucous membranes are moist.   Eyes:      Extraocular Movements: Extraocular movements intact. Conjunctiva/sclera: Conjunctivae normal.      Pupils: Pupils are equal, round, and reactive to light. Neck:      Vascular: No carotid bruit. Cardiovascular:      Rate and Rhythm: Normal rate and regular rhythm. Heart sounds: Normal heart sounds. No murmur heard. Pulmonary:      Effort: Pulmonary effort is normal. No respiratory distress. Breath sounds: Normal breath sounds. No wheezing, rhonchi or rales. Abdominal:      General: Bowel sounds are normal. There is no distension. Palpations: Abdomen is soft. Tenderness: There is no abdominal tenderness. Musculoskeletal:      Cervical back: Neck supple. Right lower leg: No edema. Left lower leg: No edema. Neurological:      General: No focal deficit present. Mental Status: He is alert and oriented to person, place, and time. Mental status is at baseline. Psychiatric:         Mood and Affect: Mood normal.         Behavior: Behavior normal.         Thought Content:  Thought content normal.         Judgment: Judgment normal.

## 2023-08-31 NOTE — PATIENT INSTRUCTIONS
Chronic Hypertension   AMBULATORY CARE:   Hypertension is considered chronic  when it continues for 3 months or longer. Hypertension that continues causes your heart to work much harder than normal, which may lead to heart damage. Even if you have hypertension for years, lifestyle changes, medicines, or both may help lower your blood pressure. Call your local emergency number [phone] in the [address]) or have someone call if:   You have chest pain. You have any of the following signs of a heart attack:      Squeezing, pressure, or pain in your chest    You may  also have any of the following:     Discomfort or pain in your back, neck, jaw, stomach, or arm    Shortness of breath    Nausea or vomiting    Lightheadedness or a sudden cold sweat    You become confused or have difficulty speaking. You suddenly feel lightheaded or have trouble breathing. Seek care immediately if:   You have a severe headache or vision loss. You have weakness in an arm or leg. Call your doctor or cardiologist if:   You feel faint, dizzy, confused, or drowsy. You have been taking your blood pressure medicine but your pressure is higher than your provider says it should be. You have questions or concerns about your condition or care. Treatment for chronic hypertension  may include medicine to lower your blood pressure and cholesterol levels. A low cholesterol level helps prevent heart disease and makes it easier to control your blood pressure. Heart disease can make your blood pressure harder to control. You may also need to make lifestyle changes. What you need to know about the stages of hypertension:  Your healthcare provider will give you a blood pressure goal based on your age, health, and risk for cardiovascular disease. The following are general guidelines on the stages of hypertension:  Normal blood pressure is 119/79 or lower .  Your provider may only check your blood pressure each year if it stays at a normal level.    Elevated blood pressure is 120/79 to 129/79 . This is sometimes called prehypertension. Your provider may suggest lifestyle changes to help lower your blood pressure to a normal level. He or she may then check it again in 3 to 6 months. Stage 1 hypertension is 130/80  to 139/89 . Your provider may recommend lifestyle changes, medication, and checks every 3 to 6 months until your blood pressure is controlled. Stage 2 hypertension is 140/90 or higher . Your provider will recommend lifestyle changes and have you take 2 kinds of hypertension medicines. You will also need to have your blood pressure checked monthly until it is controlled. Manage chronic hypertension:   Check your blood pressure at home. Do not smoke, have caffeine, or exercise for at least 30 minutes before you check your blood pressure. Sit and rest for 5 minutes before you check your blood pressure. Extend your arm and support it on a flat surface. Your arm should be at the same level as your heart. Follow the directions that came with your blood pressure monitor. Check your blood pressure 2 times, 1 minute apart, before you take your medicine in the morning. Also check your blood pressure before your evening meal. Keep a record of your readings and bring it to your follow-up visits. Your healthcare provider may use the readings to make changes to your treatment plan. Manage any other health conditions you have. Health conditions such as diabetes can increase your risk for hypertension. Follow your provider's instructions and take all your medicines as directed. Talk to your provider about any new health conditions you have recently developed. Ask about all medicines. Certain medicines can increase your blood pressure. Examples include oral birth control pills, decongestants, herbal supplements, and NSAIDs, such as ibuprofen. Your provider can tell you which medicines are safe for you to take.  This includes prescription and over-the-counter medicines. Lifestyle changes you can make to lower your blood pressure: Your provider may want you to make more lifestyle changes if you are having trouble controlling your blood pressure. This may feel difficult over time, especially if you think you are making good changes but your pressure is still high. It might help to focus on one new change at a time. For example, try to add 1 more day of exercise, or exercise for an extra 10 minutes on 2 days. Small changes can make a big difference. Your healthcare provider can also refer you to specialists such as a dietitian who can help you make small changes. Your family members may be included in helping you learn to create lifestyle changes, such as the following:     Limit sodium (salt) as directed. Too much sodium can affect your fluid balance. Check labels to find low-sodium or no-salt-added foods. Some low-sodium foods use potassium salts for flavor. Too much potassium can also cause health problems. Your provider will tell you how much sodium and potassium are safe for you to have in a day. He or she may recommend that you limit sodium to 2,300 mg a day. Follow the meal plan recommended by your provider. A dietitian or your provider can give you more information on low-sodium plans or the DASH (Dietary Approaches to Stop Hypertension) eating plan. The DASH plan is low in sodium, processed sugar, unhealthy fats, and total fat. It is high in potassium, calcium, and fiber. These can be found in vegetables, fruit, and whole-grain foods. Be physically active throughout the day. Physical activity, such as exercise, can help control your blood pressure and your weight. Be physically active for at least 30 minutes per day, on most days of the week. Include aerobic activity, such as walking or riding a bicycle. Also include strength training at least 2 times each week.  Your provider can help you create a physical activity plan. Decrease stress. This may help lower your blood pressure. Learn ways to relax, such as deep breathing or listening to music. Limit alcohol as directed. Alcohol can increase your blood pressure. A drink of alcohol is 12 ounces of beer, 5 ounces of wine, or 1½ ounces of liquor. Your provider can help you set daily and weekly drink limits. He or she may recommend no alcohol if your blood pressure stays higher than goal even with medicine or other measures. Ask your provider for information if you need help to quit. Do not smoke. Nicotine and other chemicals in cigarettes and cigars can increase your blood pressure and also cause lung damage. Ask your provider for information if you currently smoke and need help to quit. E-cigarettes or smokeless tobacco still contain nicotine. Talk to your provider before you use these products. Follow up with your doctor or cardiologist as directed: You will need to return to have your blood pressure checked and to have other lab tests done. Write down your questions so you remember to ask them during your visits. © Copyright [facility] 2022 Information is for End User's use only and may not be sold, redistributed or otherwise used for commercial purposes. The above information is an  only. It is not intended as medical advice for individual conditions or treatments. Talk to your doctor, nurse or pharmacist before following any medical regimen to see if it is safe and effective for you.

## 2023-09-01 ENCOUNTER — APPOINTMENT (OUTPATIENT)
Dept: LAB | Facility: CLINIC | Age: 68
End: 2023-09-01
Payer: MEDICARE

## 2023-09-01 DIAGNOSIS — D50.8 IRON DEFICIENCY ANEMIA SECONDARY TO INADEQUATE DIETARY IRON INTAKE: ICD-10-CM

## 2023-09-01 DIAGNOSIS — E78.2 MIXED HYPERLIPIDEMIA: ICD-10-CM

## 2023-09-01 DIAGNOSIS — Z12.5 SCREENING FOR PROSTATE CANCER: ICD-10-CM

## 2023-09-01 DIAGNOSIS — I10 BENIGN ESSENTIAL HYPERTENSION: ICD-10-CM

## 2023-09-01 DIAGNOSIS — I48.0 PAROXYSMAL ATRIAL FIBRILLATION (HCC): ICD-10-CM

## 2023-09-01 PROCEDURE — 85025 COMPLETE CBC W/AUTO DIFF WBC: CPT

## 2023-09-01 PROCEDURE — 80061 LIPID PANEL: CPT

## 2023-09-01 PROCEDURE — 83550 IRON BINDING TEST: CPT

## 2023-09-01 PROCEDURE — 82728 ASSAY OF FERRITIN: CPT

## 2023-09-01 PROCEDURE — 36415 COLL VENOUS BLD VENIPUNCTURE: CPT

## 2023-09-01 PROCEDURE — G0103 PSA SCREENING: HCPCS

## 2023-09-01 PROCEDURE — 83540 ASSAY OF IRON: CPT

## 2023-09-01 PROCEDURE — 80053 COMPREHEN METABOLIC PANEL: CPT

## 2023-09-01 PROCEDURE — 84443 ASSAY THYROID STIM HORMONE: CPT

## 2023-09-02 LAB
ALBUMIN SERPL BCP-MCNC: 4.6 G/DL (ref 3.5–5)
ALP SERPL-CCNC: 83 U/L (ref 34–104)
ALT SERPL W P-5'-P-CCNC: 20 U/L (ref 7–52)
ANION GAP SERPL CALCULATED.3IONS-SCNC: 8 MMOL/L
AST SERPL W P-5'-P-CCNC: 21 U/L (ref 13–39)
BASOPHILS # BLD AUTO: 0.02 THOUSANDS/ÂΜL (ref 0–0.1)
BASOPHILS NFR BLD AUTO: 0 % (ref 0–1)
BILIRUB SERPL-MCNC: 0.82 MG/DL (ref 0.2–1)
BUN SERPL-MCNC: 24 MG/DL (ref 5–25)
CALCIUM SERPL-MCNC: 10 MG/DL (ref 8.4–10.2)
CHLORIDE SERPL-SCNC: 102 MMOL/L (ref 96–108)
CHOLEST SERPL-MCNC: 174 MG/DL
CO2 SERPL-SCNC: 29 MMOL/L (ref 21–32)
CREAT SERPL-MCNC: 1.07 MG/DL (ref 0.6–1.3)
EOSINOPHIL # BLD AUTO: 0.1 THOUSAND/ÂΜL (ref 0–0.61)
EOSINOPHIL NFR BLD AUTO: 2 % (ref 0–6)
ERYTHROCYTE [DISTWIDTH] IN BLOOD BY AUTOMATED COUNT: 12.4 % (ref 11.6–15.1)
FERRITIN SERPL-MCNC: 230 NG/ML (ref 24–336)
GFR SERPL CREATININE-BSD FRML MDRD: 71 ML/MIN/1.73SQ M
GLUCOSE P FAST SERPL-MCNC: 82 MG/DL (ref 65–99)
HCT VFR BLD AUTO: 41.4 % (ref 36.5–49.3)
HDLC SERPL-MCNC: 49 MG/DL
HGB BLD-MCNC: 13.1 G/DL (ref 12–17)
IMM GRANULOCYTES # BLD AUTO: 0.01 THOUSAND/UL (ref 0–0.2)
IMM GRANULOCYTES NFR BLD AUTO: 0 % (ref 0–2)
IRON SATN MFR SERPL: 30 % (ref 15–50)
IRON SERPL-MCNC: 99 UG/DL (ref 50–212)
LDLC SERPL CALC-MCNC: 108 MG/DL (ref 0–100)
LYMPHOCYTES # BLD AUTO: 1.02 THOUSANDS/ÂΜL (ref 0.6–4.47)
LYMPHOCYTES NFR BLD AUTO: 19 % (ref 14–44)
MCH RBC QN AUTO: 29.2 PG (ref 26.8–34.3)
MCHC RBC AUTO-ENTMCNC: 31.6 G/DL (ref 31.4–37.4)
MCV RBC AUTO: 92 FL (ref 82–98)
MONOCYTES # BLD AUTO: 0.53 THOUSAND/ÂΜL (ref 0.17–1.22)
MONOCYTES NFR BLD AUTO: 10 % (ref 4–12)
NEUTROPHILS # BLD AUTO: 3.79 THOUSANDS/ÂΜL (ref 1.85–7.62)
NEUTS SEG NFR BLD AUTO: 69 % (ref 43–75)
NRBC BLD AUTO-RTO: 0 /100 WBCS
PLATELET # BLD AUTO: 174 THOUSANDS/UL (ref 149–390)
PMV BLD AUTO: 10.6 FL (ref 8.9–12.7)
POTASSIUM SERPL-SCNC: 4.8 MMOL/L (ref 3.5–5.3)
PROT SERPL-MCNC: 7.3 G/DL (ref 6.4–8.4)
PSA SERPL-MCNC: 0.85 NG/ML (ref 0–4)
RBC # BLD AUTO: 4.49 MILLION/UL (ref 3.88–5.62)
SODIUM SERPL-SCNC: 139 MMOL/L (ref 135–147)
TIBC SERPL-MCNC: 326 UG/DL (ref 250–450)
TRIGL SERPL-MCNC: 85 MG/DL
TSH SERPL DL<=0.05 MIU/L-ACNC: 1.93 UIU/ML (ref 0.45–4.5)
UIBC SERPL-MCNC: 227 UG/DL (ref 155–355)
WBC # BLD AUTO: 5.47 THOUSAND/UL (ref 4.31–10.16)

## 2023-10-06 ENCOUNTER — IMMUNIZATIONS (OUTPATIENT)
Dept: INTERNAL MEDICINE CLINIC | Facility: CLINIC | Age: 68
End: 2023-10-06
Payer: MEDICARE

## 2023-10-06 DIAGNOSIS — R35.1 BENIGN PROSTATIC HYPERPLASIA WITH NOCTURIA: ICD-10-CM

## 2023-10-06 DIAGNOSIS — N40.1 BENIGN PROSTATIC HYPERPLASIA WITH NOCTURIA: ICD-10-CM

## 2023-10-06 DIAGNOSIS — Z23 ENCOUNTER FOR IMMUNIZATION: Primary | ICD-10-CM

## 2023-10-06 PROCEDURE — 90662 IIV NO PRSV INCREASED AG IM: CPT | Performed by: PHYSICIAN ASSISTANT

## 2023-10-06 PROCEDURE — G0008 ADMIN INFLUENZA VIRUS VAC: HCPCS | Performed by: PHYSICIAN ASSISTANT

## 2023-10-06 RX ORDER — TAMSULOSIN HYDROCHLORIDE 0.4 MG/1
0.4 CAPSULE ORAL
Qty: 90 CAPSULE | Refills: 1 | Status: SHIPPED | OUTPATIENT
Start: 2023-10-06

## 2023-10-15 ENCOUNTER — OFFICE VISIT (OUTPATIENT)
Dept: URGENT CARE | Facility: CLINIC | Age: 68
End: 2023-10-15
Payer: MEDICARE

## 2023-10-15 VITALS
DIASTOLIC BLOOD PRESSURE: 64 MMHG | RESPIRATION RATE: 16 BRPM | WEIGHT: 179.13 LBS | TEMPERATURE: 97.2 F | BODY MASS INDEX: 27.24 KG/M2 | HEART RATE: 71 BPM | SYSTOLIC BLOOD PRESSURE: 114 MMHG | OXYGEN SATURATION: 99 %

## 2023-10-15 DIAGNOSIS — K08.89 PAIN, DENTAL: Primary | ICD-10-CM

## 2023-10-15 PROCEDURE — G0463 HOSPITAL OUTPT CLINIC VISIT: HCPCS | Performed by: PHYSICIAN ASSISTANT

## 2023-10-15 PROCEDURE — 99213 OFFICE O/P EST LOW 20 MIN: CPT | Performed by: PHYSICIAN ASSISTANT

## 2023-10-15 RX ORDER — AMOXICILLIN 500 MG/1
500 CAPSULE ORAL EVERY 12 HOURS SCHEDULED
Qty: 14 CAPSULE | Refills: 0 | Status: SHIPPED | OUTPATIENT
Start: 2023-10-15 | End: 2023-10-22

## 2023-10-15 NOTE — PATIENT INSTRUCTIONS
Take all antibiotics as prescribed. Soft foods. Warm water gargles/rinses. Heat to the area as discussed. Call dentist today to schedule an appointment in the next 1-2 days for further evaluation and treatment. Referral was placed for oral maxillofacial for history of TMJ pain and feels like previous TMJ flare-up and tooth infection  Go to the emergency department if any facial swelling or redness, trouble swallowing or breathing, worsening pain, fevers, nausea, headaches, dizziness, chest pain, shortness of breath, or other new worsening or concerning symptoms.

## 2023-10-15 NOTE — PROGRESS NOTES
[facility]        NAME: Ion Stuart is a 76 y.o. male  : 1955    MRN: [id number]  DATE: October 15, 2023  TIME: 12:51 PM    Assessment and Plan   Pain, dental [K08.89]  1. Pain, dental  amoxicillin (AMOXIL) 500 mg capsule    Ambulatory Referral to Oral Maxillofacial Surgery            Patient Instructions     Take all antibiotics as prescribed. Soft foods. Warm water gargles/rinses. Heat to the area as discussed. Call dentist today to schedule an appointment in the next 1-2 days for further evaluation and treatment. Referral was placed for oral maxillofacial for history of TMJ pain and feels like previous TMJ flare-up and tooth infection  Go to the emergency department if any facial swelling or redness, trouble swallowing or breathing, worsening pain, fevers, nausea, headaches, dizziness, chest pain, shortness of breath, or other new worsening or concerning symptoms. Chief Complaint     Chief Complaint   Patient presents with    Jaw Pain     Sharp pain left jaw. Pt states he wears mouth piece at night pain radiated to left temple. Denies earache, no dizziness. Pain comes and goes, sharp pain. Denies toothache, no cold sx. Denies chest pain. Took advil with no relief. History of Present Illness       27-year-old male presents for evaluation of left-sided tooth and jaw pain over the past 3-4 days. States has a history of TMJ and dental infections, states it feels similar. States he has noticed himself clenching his jaw during the day and thinks he flared up his TMJ. States in the past his dentist has given him a mouthpiece to wear at night, antibiotics and ibuprofen. States he has been using the nightguard and trying Advil with mild improvement. States the pain comes and goes, worse with opening his mouth fully. Denies any tooth injuries or trauma. Denies any other injuries or trauma to the area. States he is also tried Orajel to his back lower left.  States the antibiotics have helped him in the past when he had this. He denies any gum swelling teeth with little improvement, facial swelling or redness, fevers or chills. He denies any cough or cold-like symptoms, ear pain, headache, dizziness, nausea, vomiting, vision changes, radiation of pain, numbness, tingling, weakness, chest pain, chest tightness, shortness of breath, GI/ symptoms or other complaints. Review of Systems   Review of Systems   Constitutional:  Negative for activity change, appetite change, chills, fatigue and fever. HENT:  Positive for dental problem. Negative for congestion, drooling, ear pain, facial swelling, hearing loss, rhinorrhea, sore throat, trouble swallowing and voice change. Left sided jaw/TMJ flare-up and pain   Eyes:  Negative for photophobia, pain, itching and visual disturbance. Respiratory:  Negative for cough, chest tightness, shortness of breath and wheezing. Cardiovascular:  Negative for chest pain and leg swelling. Gastrointestinal:  Negative for abdominal pain, diarrhea, nausea and vomiting. Genitourinary:  Negative for decreased urine volume. Musculoskeletal:  Negative for back pain, joint swelling, neck pain and neck stiffness. Skin:  Negative for rash. Neurological:  Negative for dizziness, seizures, syncope, weakness, light-headedness, numbness and headaches. All other systems reviewed and are negative.         Current Medications       Current Outpatient Medications:     amoxicillin (AMOXIL) 500 mg capsule, Take 1 capsule (500 mg total) by mouth every 12 (twelve) hours for 7 days, Disp: 14 capsule, Rfl: 0    Ascorbic Acid (vitamin C) 100 MG tablet, Take 100 mg by mouth daily, Disp: , Rfl:     aspirin 81 mg chewable tablet, Two tabs po q day., Disp: 30 tablet, Rfl: 0    Bevacizumab (AVASTIN IV), Infuse into a venous catheter Every 4 weeks , Disp: , Rfl:     sertraline (ZOLOFT) 50 mg tablet, Take 1 tablet (50 mg total) by mouth daily, Disp: 90 tablet, Rfl: 3    tamsulosin (FLOMAX) 0.4 mg, Take 1 capsule (0.4 mg total) by mouth daily with dinner, Disp: 90 capsule, Rfl: 1    atorvastatin (LIPITOR) 40 mg tablet, Take 1 tablet (40 mg total) by mouth every evening (Patient not taking: Reported on 3/4/2022), Disp: 30 tablet, Rfl: 0    clopidogrel (Plavix) 75 mg tablet, Take 1 tablet (75 mg total) by mouth daily (Patient not taking: Reported on 10/15/2023), Disp: 90 tablet, Rfl: 1    montelukast (SINGULAIR) 10 mg tablet, , Disp: , Rfl:     Current Allergies     Allergies as of 10/15/2023 - Reviewed 10/15/2023   Allergen Reaction Noted    Prednisone GI Intolerance 06/24/2020            The following portions of the patient's history were reviewed and updated as appropriate: allergies, current medications, past family history, past medical history, past social history, past surgical history and problem list.     Past Medical History:   Diagnosis Date    Abnormal eye finding     last assessed 08/18/2015    Anemia     Atrial fibrillation (HCC)     patient denies    Colon polyp     Generalized anxiety disorder     History of kidney cancer     Hyperlipidemia     no longer    Hypertension     no longer    TIA (transient ischemic attack)        Past Surgical History:   Procedure Laterality Date    COLONOSCOPY N/A 3/10/2017    Procedure: COLONOSCOPY;  Surgeon: Nohemy Sheppard MD;  Location: [facility];  Service:     EXTERNAL EAR SURGERY      KIDNEY SURGERY      TIBIA FRACTURE SURGERY      TONSILLECTOMY  childhood    TOOTH EXTRACTION  08/11/2021       Family History   Problem Relation Age of Onset    No Known Problems Mother     Cancer Paternal Uncle          Medications have been verified. Objective   /64   Pulse 71   Temp (!) 97.2 °F (36.2 °C)   Resp 16   Wt 81.3 kg (179 lb 2 oz)   SpO2 99%   BMI 27.24 kg/m²        Physical Exam     Physical Exam  Vitals and nursing note reviewed. Constitutional:       General: He is not in acute distress.      Appearance: Normal appearance. He is well-developed. He is not ill-appearing or toxic-appearing. HENT:      Head:      Jaw: Tenderness and pain on movement present. No trismus or swelling. Right Ear: Tympanic membrane normal.      Left Ear: Tympanic membrane normal.      Mouth/Throat:      Mouth: Mucous membranes are moist.      Dentition: Abnormal dentition. Pharynx: Oropharynx is clear. Uvula midline. No oropharyngeal exudate, posterior oropharyngeal erythema or uvula swelling. Eyes:      Extraocular Movements: Extraocular movements intact. Pupils: Pupils are equal, round, and reactive to light. Comments: No nystagmus, no pain with EOMs. No facial swelling or erythema. Cardiovascular:      Rate and Rhythm: Normal rate and regular rhythm. Heart sounds: Normal heart sounds. Pulmonary:      Effort: Pulmonary effort is normal. No respiratory distress. Breath sounds: Normal breath sounds. No wheezing. Musculoskeletal:      Cervical back: Normal range of motion and neck supple. No rigidity. Skin:     Capillary Refill: Capillary refill takes less than 2 seconds. Neurological:      Mental Status: He is alert and oriented to person, place, and time.    Psychiatric:         Behavior: Behavior normal.

## 2023-12-26 PROBLEM — G45.9 TIA (TRANSIENT ISCHEMIC ATTACK): Status: ACTIVE | Noted: 2023-09-12

## 2024-01-04 ENCOUNTER — OFFICE VISIT (OUTPATIENT)
Dept: INTERNAL MEDICINE CLINIC | Facility: CLINIC | Age: 69
End: 2024-01-04
Payer: MEDICARE

## 2024-01-04 VITALS
DIASTOLIC BLOOD PRESSURE: 72 MMHG | SYSTOLIC BLOOD PRESSURE: 112 MMHG | TEMPERATURE: 96.1 F | BODY MASS INDEX: 26.37 KG/M2 | OXYGEN SATURATION: 99 % | HEART RATE: 67 BPM | WEIGHT: 174 LBS | HEIGHT: 68 IN

## 2024-01-04 DIAGNOSIS — Z23 ENCOUNTER FOR IMMUNIZATION: ICD-10-CM

## 2024-01-04 DIAGNOSIS — Z86.73 HISTORY OF TIA (TRANSIENT ISCHEMIC ATTACK): ICD-10-CM

## 2024-01-04 DIAGNOSIS — E03.9 ACQUIRED HYPOTHYROIDISM: ICD-10-CM

## 2024-01-04 DIAGNOSIS — R56.9 FOCAL SEIZURES (HCC): ICD-10-CM

## 2024-01-04 DIAGNOSIS — C79.51 MALIGNANT NEOPLASM METASTATIC TO BONE (HCC): ICD-10-CM

## 2024-01-04 DIAGNOSIS — Z13.1 SCREENING FOR DIABETES MELLITUS (DM): ICD-10-CM

## 2024-01-04 DIAGNOSIS — C64.9 MALIGNANT NEOPLASM OF KIDNEY, UNSPECIFIED LATERALITY (HCC): ICD-10-CM

## 2024-01-04 DIAGNOSIS — C79.31 METASTASIS TO BRAIN (HCC): ICD-10-CM

## 2024-01-04 DIAGNOSIS — F41.9 ANXIETY: ICD-10-CM

## 2024-01-04 DIAGNOSIS — G45.9 TIA (TRANSIENT ISCHEMIC ATTACK): ICD-10-CM

## 2024-01-04 DIAGNOSIS — I10 BENIGN ESSENTIAL HYPERTENSION: Primary | ICD-10-CM

## 2024-01-04 DIAGNOSIS — I68.0 CEREBRAL AMYLOID ANGIOPATHY (CODE): ICD-10-CM

## 2024-01-04 DIAGNOSIS — I48.0 PAROXYSMAL ATRIAL FIBRILLATION (HCC): ICD-10-CM

## 2024-01-04 DIAGNOSIS — E78.2 MIXED HYPERLIPIDEMIA: ICD-10-CM

## 2024-01-04 DIAGNOSIS — D50.8 IRON DEFICIENCY ANEMIA SECONDARY TO INADEQUATE DIETARY IRON INTAKE: ICD-10-CM

## 2024-01-04 DIAGNOSIS — F41.1 GENERALIZED ANXIETY DISORDER: ICD-10-CM

## 2024-01-04 DIAGNOSIS — M15.9 GENERALIZED OSTEOARTHRITIS: ICD-10-CM

## 2024-01-04 PROCEDURE — 99214 OFFICE O/P EST MOD 30 MIN: CPT | Performed by: INTERNAL MEDICINE

## 2024-01-04 NOTE — PROGRESS NOTES
Assessment/Plan:  Problem List Items Addressed This Visit        Endocrine    Hypothyroidism    Relevant Orders    TSH, 3rd generation       Cardiovascular and Mediastinum    RESOLVED: TIA (transient ischemic attack)    Paroxysmal atrial fibrillation (HCC)    Relevant Orders    Comprehensive metabolic panel    TSH, 3rd generation    CBC and differential    Cerebral amyloid angiopathy (CODE)    Benign essential hypertension - Primary    Relevant Orders    Comprehensive metabolic panel    Albumin / creatinine urine ratio       Nervous and Auditory    Metastasis to brain (HCC)       Musculoskeletal and Integument    Generalized osteoarthritis    Bone metastasis       Genitourinary    Malignant neoplasm of kidney (HCC)    Relevant Orders    Comprehensive metabolic panel    CBC and differential       Other    Iron deficiency anemia secondary to inadequate dietary iron intake    Relevant Orders    CBC and differential    Hyperlipidemia    Relevant Orders    LDL cholesterol, direct    Triglycerides    History of TIA (transient ischemic attack)    Generalized anxiety disorder    Relevant Medications    sertraline (ZOLOFT) 50 mg tablet    Focal seizures (HCC)   Other Visit Diagnoses     Encounter for immunization        Relevant Orders    Pneumococcal Conjugate Vaccine 20-valent (Pcv20)    Screening for diabetes mellitus (DM)        Relevant Orders    Hemoglobin A1C    Anxiety        Relevant Medications    sertraline (ZOLOFT) 50 mg tablet           Diagnoses and all orders for this visit:    Benign essential hypertension  -     Comprehensive metabolic panel; Future  -     Albumin / creatinine urine ratio    Encounter for immunization  -     Pneumococcal Conjugate Vaccine 20-valent (Pcv20)    Paroxysmal atrial fibrillation (HCC)  -     Comprehensive metabolic panel; Future  -     TSH, 3rd generation; Future  -     CBC and differential; Future    TIA (transient ischemic attack)    Acquired hypothyroidism  -     TSH, 3rd  generation; Future    Generalized osteoarthritis    Malignant neoplasm of kidney, unspecified laterality (HCC)  -     Comprehensive metabolic panel; Future  -     CBC and differential; Future    History of TIA (transient ischemic attack)    Generalized anxiety disorder    Mixed hyperlipidemia  -     LDL cholesterol, direct; Future  -     Triglycerides; Future    Iron deficiency anemia secondary to inadequate dietary iron intake  -     CBC and differential; Future    Screening for diabetes mellitus (DM)  -     Hemoglobin A1C; Future    Focal seizures (HCC)    Metastasis to brain (HCC)    Malignant neoplasm metastatic to bone (HCC)    Cerebral amyloid angiopathy (CODE)    Anxiety  -     sertraline (ZOLOFT) 50 mg tablet; Take 1 tablet (50 mg total) by mouth daily        No problem-specific Assessment & Plan notes found for this encounter.    A/P: Doing ok and will check labs. Discussed vaccines is up to date. Appreciate neuro input and will continue to modify his risks, BP, HLD, and sugars. Now off the plavix. . Appreciate optho input. Continue current treatment and RTC four months for routine.     Subjective:      Patient ID: Ankit Beal is a 68 y.o. male.    WM RTC For f/u HTN, PAF, etc. Doing ok, but just dx with CODE by neuro. No more stroke like events and appears to be due to CODE instead. Off plavix, but ASA continues. . Remains active w/o difficulty and no falls. Denies CP, SOB, edema, palpitations, orthopnea or PND. Renal Cell Ca in remission. AMD no worse and continues to see optho for injections. EMBER/MDD are controlled. Chronic pain is manageable. Due for labs and vaccines.         The following portions of the patient's history were reviewed and updated as appropriate:   He has a past medical history of Abnormal eye finding, Anemia, Atrial fibrillation (HCC), Colon polyp, Focal seizures (HCC) (1/4/2024), Generalized anxiety disorder, History of kidney cancer, Hyperlipidemia, Hypertension, and TIA  (transient ischemic attack).,  does not have any pertinent problems on file.,   has a past surgical history that includes External ear surgery; Kidney surgery; Tibia fracture surgery; Colonoscopy (N/A, 3/10/2017); Tonsillectomy (childhood); and Tooth extraction (08/11/2021).,  family history includes Cancer in his paternal uncle; No Known Problems in his mother.,   reports that he quit smoking about 25 years ago. His smoking use included cigarettes. He has never been exposed to tobacco smoke. He has never used smokeless tobacco. He reports that he does not drink alcohol and does not use drugs.,  is allergic to prednisone..  Current Outpatient Medications   Medication Sig Dispense Refill   • Ascorbic Acid (vitamin C) 100 MG tablet Take 100 mg by mouth daily     • aspirin 81 mg chewable tablet Two tabs po q day. (Patient taking differently: Chew 81 mg once Two tabs po q day.) 30 tablet 0   • Bevacizumab (AVASTIN IV) Infuse into a venous catheter Every 4 weeks      • sertraline (ZOLOFT) 50 mg tablet Take 1 tablet (50 mg total) by mouth daily 90 tablet 3   • tamsulosin (FLOMAX) 0.4 mg Take 1 capsule (0.4 mg total) by mouth daily with dinner 90 capsule 1   • atorvastatin (LIPITOR) 40 mg tablet Take 1 tablet (40 mg total) by mouth every evening (Patient not taking: Reported on 3/4/2022) 30 tablet 0   • clopidogrel (Plavix) 75 mg tablet Take 1 tablet (75 mg total) by mouth daily (Patient not taking: Reported on 10/15/2023) 90 tablet 1   • montelukast (SINGULAIR) 10 mg tablet  (Patient not taking: Reported on 3/31/2022)       No current facility-administered medications for this visit.       Review of Systems   Constitutional:  Negative for activity change, chills, diaphoresis, fatigue and fever.   HENT: Negative.     Eyes:  Positive for visual disturbance.   Respiratory:  Negative for cough, chest tightness, shortness of breath and wheezing.    Cardiovascular:  Negative for chest pain, palpitations and leg swelling.  "  Gastrointestinal:  Negative for abdominal pain, constipation, diarrhea, nausea and vomiting.   Endocrine: Negative for cold intolerance and heat intolerance.   Genitourinary:  Negative for difficulty urinating, dysuria and frequency.   Musculoskeletal:  Negative for arthralgias, gait problem and myalgias.   Neurological:  Negative for dizziness, seizures, syncope, weakness, light-headedness and headaches.   Psychiatric/Behavioral:  Negative for confusion, dysphoric mood and sleep disturbance. The patient is not nervous/anxious.        PHQ-2/9 Depression Screening    Little interest or pleasure in doing things: 0 - not at all  Feeling down, depressed, or hopeless: 0 - not at all  PHQ-2 Score: 0  PHQ-2 Interpretation: Negative depression screen        Objective:  Vitals:    01/04/24 1039   BP: 112/72   Pulse: 67   Temp: (!) 96.1 °F (35.6 °C)   TempSrc: Tympanic   SpO2: 99%   Weight: 78.9 kg (174 lb)   Height: 5' 8\" (1.727 m)     Body mass index is 26.46 kg/m².     Physical Exam  Constitutional:       General: He is not in acute distress.     Appearance: Normal appearance. He is not ill-appearing.   HENT:      Head: Normocephalic and atraumatic.      Mouth/Throat:      Mouth: Mucous membranes are moist.   Eyes:      Extraocular Movements: Extraocular movements intact.      Conjunctiva/sclera: Conjunctivae normal.      Pupils: Pupils are equal, round, and reactive to light.   Neck:      Vascular: No carotid bruit.   Cardiovascular:      Rate and Rhythm: Normal rate and regular rhythm.      Heart sounds: Normal heart sounds. No murmur heard.  Pulmonary:      Effort: Pulmonary effort is normal. No respiratory distress.      Breath sounds: Normal breath sounds. No wheezing, rhonchi or rales.   Abdominal:      General: Bowel sounds are normal. There is no distension.      Palpations: Abdomen is soft.      Tenderness: There is no abdominal tenderness.   Musculoskeletal:      Cervical back: Neck supple.      Right lower " leg: No edema.      Left lower leg: No edema.   Neurological:      General: No focal deficit present.      Mental Status: He is alert and oriented to person, place, and time. Mental status is at baseline.   Psychiatric:         Mood and Affect: Mood normal.         Behavior: Behavior normal.         Thought Content: Thought content normal.         Judgment: Judgment normal.

## 2024-01-09 DIAGNOSIS — F41.9 ANXIETY: ICD-10-CM

## 2024-01-12 ENCOUNTER — APPOINTMENT (OUTPATIENT)
Dept: LAB | Facility: CLINIC | Age: 69
End: 2024-01-12
Payer: MEDICARE

## 2024-01-12 DIAGNOSIS — E03.9 ACQUIRED HYPOTHYROIDISM: ICD-10-CM

## 2024-01-12 DIAGNOSIS — I48.0 PAROXYSMAL ATRIAL FIBRILLATION (HCC): ICD-10-CM

## 2024-01-12 DIAGNOSIS — C64.9 MALIGNANT NEOPLASM OF KIDNEY, UNSPECIFIED LATERALITY (HCC): ICD-10-CM

## 2024-01-12 DIAGNOSIS — Z13.1 SCREENING FOR DIABETES MELLITUS (DM): ICD-10-CM

## 2024-01-12 DIAGNOSIS — I10 BENIGN ESSENTIAL HYPERTENSION: ICD-10-CM

## 2024-01-12 DIAGNOSIS — D50.8 IRON DEFICIENCY ANEMIA SECONDARY TO INADEQUATE DIETARY IRON INTAKE: ICD-10-CM

## 2024-01-12 DIAGNOSIS — E78.2 MIXED HYPERLIPIDEMIA: ICD-10-CM

## 2024-01-12 LAB
ALBUMIN SERPL BCP-MCNC: 4.5 G/DL (ref 3.5–5)
ALP SERPL-CCNC: 75 U/L (ref 34–104)
ALT SERPL W P-5'-P-CCNC: 15 U/L (ref 7–52)
ANION GAP SERPL CALCULATED.3IONS-SCNC: 8 MMOL/L
AST SERPL W P-5'-P-CCNC: 18 U/L (ref 13–39)
BASOPHILS # BLD AUTO: 0.03 THOUSANDS/ÂΜL (ref 0–0.1)
BASOPHILS NFR BLD AUTO: 1 % (ref 0–1)
BILIRUB SERPL-MCNC: 0.7 MG/DL (ref 0.2–1)
BUN SERPL-MCNC: 26 MG/DL (ref 5–25)
CALCIUM SERPL-MCNC: 9.3 MG/DL (ref 8.4–10.2)
CHLORIDE SERPL-SCNC: 101 MMOL/L (ref 96–108)
CO2 SERPL-SCNC: 30 MMOL/L (ref 21–32)
CREAT SERPL-MCNC: 0.98 MG/DL (ref 0.6–1.3)
CREAT UR-MCNC: 102.5 MG/DL
EOSINOPHIL # BLD AUTO: 0.06 THOUSAND/ÂΜL (ref 0–0.61)
EOSINOPHIL NFR BLD AUTO: 1 % (ref 0–6)
ERYTHROCYTE [DISTWIDTH] IN BLOOD BY AUTOMATED COUNT: 12.1 % (ref 11.6–15.1)
GFR SERPL CREATININE-BSD FRML MDRD: 78 ML/MIN/1.73SQ M
GLUCOSE P FAST SERPL-MCNC: 82 MG/DL (ref 65–99)
HCT VFR BLD AUTO: 41.9 % (ref 36.5–49.3)
HGB BLD-MCNC: 13.5 G/DL (ref 12–17)
IMM GRANULOCYTES # BLD AUTO: 0.01 THOUSAND/UL (ref 0–0.2)
IMM GRANULOCYTES NFR BLD AUTO: 0 % (ref 0–2)
LDLC SERPL DIRECT ASSAY-MCNC: 91 MG/DL (ref 0–100)
LYMPHOCYTES # BLD AUTO: 1.05 THOUSANDS/ÂΜL (ref 0.6–4.47)
LYMPHOCYTES NFR BLD AUTO: 22 % (ref 14–44)
MCH RBC QN AUTO: 30.2 PG (ref 26.8–34.3)
MCHC RBC AUTO-ENTMCNC: 32.2 G/DL (ref 31.4–37.4)
MCV RBC AUTO: 94 FL (ref 82–98)
MICROALBUMIN UR-MCNC: <7 MG/L
MICROALBUMIN/CREAT 24H UR: <7 MG/G CREATININE (ref 0–30)
MONOCYTES # BLD AUTO: 0.56 THOUSAND/ÂΜL (ref 0.17–1.22)
MONOCYTES NFR BLD AUTO: 12 % (ref 4–12)
NEUTROPHILS # BLD AUTO: 3.16 THOUSANDS/ÂΜL (ref 1.85–7.62)
NEUTS SEG NFR BLD AUTO: 64 % (ref 43–75)
NRBC BLD AUTO-RTO: 0 /100 WBCS
PLATELET # BLD AUTO: 175 THOUSANDS/UL (ref 149–390)
PMV BLD AUTO: 11 FL (ref 8.9–12.7)
POTASSIUM SERPL-SCNC: 4.9 MMOL/L (ref 3.5–5.3)
PROT SERPL-MCNC: 6.8 G/DL (ref 6.4–8.4)
RBC # BLD AUTO: 4.47 MILLION/UL (ref 3.88–5.62)
SODIUM SERPL-SCNC: 139 MMOL/L (ref 135–147)
TRIGL SERPL-MCNC: 93 MG/DL
WBC # BLD AUTO: 4.87 THOUSAND/UL (ref 4.31–10.16)

## 2024-01-12 PROCEDURE — 84478 ASSAY OF TRIGLYCERIDES: CPT

## 2024-01-12 PROCEDURE — 83721 ASSAY OF BLOOD LIPOPROTEIN: CPT

## 2024-01-12 PROCEDURE — 80053 COMPREHEN METABOLIC PANEL: CPT

## 2024-01-12 PROCEDURE — 85025 COMPLETE CBC W/AUTO DIFF WBC: CPT

## 2024-01-12 PROCEDURE — 82570 ASSAY OF URINE CREATININE: CPT | Performed by: INTERNAL MEDICINE

## 2024-01-12 PROCEDURE — 82043 UR ALBUMIN QUANTITATIVE: CPT | Performed by: INTERNAL MEDICINE

## 2024-01-12 PROCEDURE — 36415 COLL VENOUS BLD VENIPUNCTURE: CPT

## 2024-01-12 PROCEDURE — 84443 ASSAY THYROID STIM HORMONE: CPT

## 2024-01-12 PROCEDURE — 83036 HEMOGLOBIN GLYCOSYLATED A1C: CPT

## 2024-01-13 LAB
EST. AVERAGE GLUCOSE BLD GHB EST-MCNC: 114 MG/DL
HBA1C MFR BLD: 5.6 %
TSH SERPL DL<=0.05 MIU/L-ACNC: 1.67 UIU/ML (ref 0.45–4.5)

## 2024-03-13 DIAGNOSIS — N40.1 BENIGN PROSTATIC HYPERPLASIA WITH NOCTURIA: ICD-10-CM

## 2024-03-13 DIAGNOSIS — R35.1 BENIGN PROSTATIC HYPERPLASIA WITH NOCTURIA: ICD-10-CM

## 2024-03-13 RX ORDER — TAMSULOSIN HYDROCHLORIDE 0.4 MG/1
0.4 CAPSULE ORAL
Qty: 90 CAPSULE | Refills: 1 | Status: SHIPPED | OUTPATIENT
Start: 2024-03-13

## 2024-04-01 ENCOUNTER — RA CDI HCC (OUTPATIENT)
Dept: OTHER | Facility: HOSPITAL | Age: 69
End: 2024-04-01

## 2024-04-08 ENCOUNTER — OFFICE VISIT (OUTPATIENT)
Dept: INTERNAL MEDICINE CLINIC | Facility: CLINIC | Age: 69
End: 2024-04-08
Payer: MEDICARE

## 2024-04-08 VITALS
HEIGHT: 68 IN | DIASTOLIC BLOOD PRESSURE: 70 MMHG | HEART RATE: 70 BPM | SYSTOLIC BLOOD PRESSURE: 120 MMHG | BODY MASS INDEX: 26.98 KG/M2 | OXYGEN SATURATION: 98 % | WEIGHT: 178 LBS | TEMPERATURE: 98.9 F

## 2024-04-08 DIAGNOSIS — I48.0 PAROXYSMAL ATRIAL FIBRILLATION (HCC): ICD-10-CM

## 2024-04-08 DIAGNOSIS — E66.3 OVERWEIGHT (BMI 25.0-29.9): ICD-10-CM

## 2024-04-08 DIAGNOSIS — F41.1 GENERALIZED ANXIETY DISORDER: ICD-10-CM

## 2024-04-08 DIAGNOSIS — H35.81 MACULAR EDEMA: ICD-10-CM

## 2024-04-08 DIAGNOSIS — C64.9 MALIGNANT NEOPLASM OF KIDNEY, UNSPECIFIED LATERALITY (HCC): ICD-10-CM

## 2024-04-08 DIAGNOSIS — D50.8 IRON DEFICIENCY ANEMIA SECONDARY TO INADEQUATE DIETARY IRON INTAKE: ICD-10-CM

## 2024-04-08 DIAGNOSIS — E78.2 MIXED HYPERLIPIDEMIA: ICD-10-CM

## 2024-04-08 DIAGNOSIS — I10 BENIGN ESSENTIAL HYPERTENSION: Primary | ICD-10-CM

## 2024-04-08 DIAGNOSIS — Z86.73 HISTORY OF TIA (TRANSIENT ISCHEMIC ATTACK): ICD-10-CM

## 2024-04-08 DIAGNOSIS — E03.9 ACQUIRED HYPOTHYROIDISM: ICD-10-CM

## 2024-04-08 PROCEDURE — G2211 COMPLEX E/M VISIT ADD ON: HCPCS | Performed by: INTERNAL MEDICINE

## 2024-04-08 PROCEDURE — 99214 OFFICE O/P EST MOD 30 MIN: CPT | Performed by: INTERNAL MEDICINE

## 2024-08-01 ENCOUNTER — RA CDI HCC (OUTPATIENT)
Dept: OTHER | Facility: HOSPITAL | Age: 69
End: 2024-08-01

## 2024-08-08 ENCOUNTER — OFFICE VISIT (OUTPATIENT)
Dept: INTERNAL MEDICINE CLINIC | Facility: CLINIC | Age: 69
End: 2024-08-08
Payer: MEDICARE

## 2024-08-08 VITALS
HEIGHT: 68 IN | HEART RATE: 63 BPM | DIASTOLIC BLOOD PRESSURE: 62 MMHG | SYSTOLIC BLOOD PRESSURE: 118 MMHG | TEMPERATURE: 97.4 F | BODY MASS INDEX: 26.46 KG/M2 | WEIGHT: 174.6 LBS | OXYGEN SATURATION: 98 %

## 2024-08-08 DIAGNOSIS — H35.81 MACULAR EDEMA: ICD-10-CM

## 2024-08-08 DIAGNOSIS — M15.9 GENERALIZED OSTEOARTHRITIS: ICD-10-CM

## 2024-08-08 DIAGNOSIS — E66.3 OVERWEIGHT (BMI 25.0-29.9): ICD-10-CM

## 2024-08-08 DIAGNOSIS — I10 BENIGN ESSENTIAL HYPERTENSION: Primary | ICD-10-CM

## 2024-08-08 DIAGNOSIS — C79.51 MALIGNANT NEOPLASM METASTATIC TO BONE (HCC): ICD-10-CM

## 2024-08-08 DIAGNOSIS — C79.31 METASTASIS TO BRAIN (HCC): ICD-10-CM

## 2024-08-08 DIAGNOSIS — D50.8 IRON DEFICIENCY ANEMIA SECONDARY TO INADEQUATE DIETARY IRON INTAKE: ICD-10-CM

## 2024-08-08 DIAGNOSIS — C78.00 MALIGNANT NEOPLASM METASTATIC TO LUNG, UNSPECIFIED LATERALITY (HCC): ICD-10-CM

## 2024-08-08 DIAGNOSIS — E78.2 MIXED HYPERLIPIDEMIA: ICD-10-CM

## 2024-08-08 DIAGNOSIS — Z13.29 SCREENING FOR THYROID DISORDER: ICD-10-CM

## 2024-08-08 DIAGNOSIS — M89.9 DISORDER OF BONE, UNSPECIFIED: ICD-10-CM

## 2024-08-08 DIAGNOSIS — R56.9 FOCAL SEIZURES (HCC): ICD-10-CM

## 2024-08-08 DIAGNOSIS — E03.9 ACQUIRED HYPOTHYROIDISM: ICD-10-CM

## 2024-08-08 DIAGNOSIS — I48.0 PAROXYSMAL ATRIAL FIBRILLATION (HCC): ICD-10-CM

## 2024-08-08 DIAGNOSIS — Z12.5 SCREENING FOR PROSTATE CANCER: ICD-10-CM

## 2024-08-08 DIAGNOSIS — Z13.220 SCREENING FOR LIPID DISORDERS: ICD-10-CM

## 2024-08-08 DIAGNOSIS — C64.9 MALIGNANT NEOPLASM OF KIDNEY, UNSPECIFIED LATERALITY (HCC): ICD-10-CM

## 2024-08-08 DIAGNOSIS — Z13.1 SCREENING FOR DIABETES MELLITUS (DM): ICD-10-CM

## 2024-08-08 DIAGNOSIS — Z13.0 SCREENING FOR DEFICIENCY ANEMIA: ICD-10-CM

## 2024-08-08 PROCEDURE — 99214 OFFICE O/P EST MOD 30 MIN: CPT | Performed by: INTERNAL MEDICINE

## 2024-08-08 NOTE — PROGRESS NOTES
Ambulatory Visit  Name: Ankit Beal      : 1955      MRN: [id number]  Encounter Provider: Jonny Rahman DO  Encounter Date: 2024   Encounter department: [facility]    Assessment & Plan   1. Benign essential hypertension  -     Albumin / creatinine urine ratio; Future; Expected date: 10/03/2024  -     CBC and differential; Future; Expected date: 10/03/2024  -     Comprehensive metabolic panel; Future; Expected date: 10/03/2024  -     Hemoglobin A1C; Future; Expected date: 10/03/2024  -     Vitamin D 25 hydroxy; Future  2. Paroxysmal atrial fibrillation (HCC)  -     CBC and differential; Future; Expected date: 10/03/2024  -     TSH, 3rd generation; Future; Expected date: 10/03/2024  -     Vitamin D 25 hydroxy; Future  3. Acquired hypothyroidism  -     TSH, 3rd generation; Future; Expected date: 10/03/2024  -     Vitamin D 25 hydroxy; Future  4. Generalized osteoarthritis  -     Vitamin D 25 hydroxy; Future  5. Iron deficiency anemia secondary to inadequate dietary iron intake  -     CBC and differential; Future; Expected date: 10/03/2024  -     Vitamin D 25 hydroxy; Future  6. Mixed hyperlipidemia  -     Lipid Panel with Direct LDL reflex; Future; Expected date: 10/03/2024  -     Vitamin D 25 hydroxy; Future  7. Overweight (BMI 25.0-29.9)  -     Hemoglobin A1C; Future; Expected date: 10/03/2024  -     Vitamin D 25 hydroxy; Future  8. Focal seizures (HCC)  -     Vitamin D 25 hydroxy; Future  9. Macular edema  -     Vitamin D 25 hydroxy; Future  10. Malignant neoplasm of kidney, unspecified laterality (HCC)  -     Vitamin D 25 hydroxy; Future  11. Screening for prostate cancer  -     PSA, Total Screen; Future; Expected date: 10/03/2024  -     Vitamin D 25 hydroxy; Future  12. Screening for lipid disorders  -     Lipid Panel with Direct LDL reflex; Future; Expected date: 10/03/2024  -     Vitamin D 25 hydroxy; Future  13. Screening for diabetes mellitus (DM)  -     Hemoglobin A1C;  Future; Expected date: 10/03/2024  -     Vitamin D 25 hydroxy; Future  14. Screening for thyroid disorder  -     Vitamin D 25 hydroxy; Future  15. Screening for deficiency anemia  -     Vitamin D 25 hydroxy; Future  16. Malignant neoplasm metastatic to bone (HCC)  -     Vitamin D 25 hydroxy; Future  17. Metastasis to brain (HCC)  -     Vitamin D 25 hydroxy; Future  18. Malignant neoplasm metastatic to lung, unspecified laterality (HCC)  -     Vitamin D 25 hydroxy; Future  19. Disorder of bone, unspecified  -     Vitamin D 25 hydroxy; Future  A/P: Doing ok and will check labs. Discussed vaccines and pt requesting Td booster due to recent grandchild with last booster 8/15. Continue current treatment and RTC four months for routine      History of Present Illness     WM RTC for f/u HTN, hypothyroidism, etc. Doing ok and no new issues, but new grandfather. . Remains very active and no falls. No stroke like events. Renal cell ca in remission. Chronic pain is manageable. Still getting ocular injections. Vision getting better.  EMBER is controlled. Due for labs and vaccines.         Review of Systems   Constitutional:  Negative for activity change, chills, diaphoresis, fatigue and fever.   HENT: Negative.     Eyes:  Negative for visual disturbance.   Respiratory:  Negative for cough, chest tightness, shortness of breath and wheezing.    Cardiovascular:  Negative for chest pain, palpitations and leg swelling.   Gastrointestinal:  Negative for abdominal pain, constipation, diarrhea, nausea and vomiting.   Endocrine: Negative for cold intolerance and heat intolerance.   Genitourinary:  Negative for difficulty urinating, dysuria and frequency.   Musculoskeletal:  Negative for arthralgias, gait problem and myalgias.   Neurological:  Negative for dizziness, seizures, syncope, weakness, light-headedness and headaches.   Psychiatric/Behavioral:  Negative for confusion, dysphoric mood and sleep disturbance. The patient is not  "nervous/anxious.        Objective     /62 (BP Location: Right arm, Patient Position: Sitting, Cuff Size: Adult)   Pulse 63   Temp (!) 97.4 °F (36.3 °C) (Tympanic)   Ht 5' 8\" (1.727 m)   Wt 79.2 kg (174 lb 9.6 oz)   SpO2 98%   BMI 26.55 kg/m²     Physical Exam  Vitals and nursing note reviewed.   Constitutional:       General: He is not in acute distress.     Appearance: Normal appearance. He is not ill-appearing.   HENT:      Head: Normocephalic and atraumatic.      Mouth/Throat:      Mouth: Mucous membranes are moist.   Eyes:      Extraocular Movements: Extraocular movements intact.      Conjunctiva/sclera: Conjunctivae normal.      Pupils: Pupils are equal, round, and reactive to light.   Neck:      Vascular: No carotid bruit.   Cardiovascular:      Rate and Rhythm: Normal rate and regular rhythm.      Heart sounds: Normal heart sounds. No murmur heard.  Pulmonary:      Effort: Pulmonary effort is normal. No respiratory distress.      Breath sounds: Normal breath sounds. No wheezing, rhonchi or rales.   Abdominal:      General: Bowel sounds are normal. There is no distension.      Palpations: Abdomen is soft.      Tenderness: There is no abdominal tenderness.   Musculoskeletal:      Cervical back: Neck supple.      Right lower leg: No edema.      Left lower leg: No edema.   Neurological:      General: No focal deficit present.      Mental Status: He is alert and oriented to person, place, and time. Mental status is at baseline.   Psychiatric:         Mood and Affect: Mood normal.         Behavior: Behavior normal.         Thought Content: Thought content normal.         Judgment: Judgment normal.       Administrative Statements           "

## 2024-08-08 NOTE — PATIENT INSTRUCTIONS
"Patient Education     High blood pressure in adults   The Basics   Written by the doctors and editors at [facility]   What is high blood pressure? -- High blood pressure is a condition that puts you at risk for heart attack, stroke, and kidney disease. It does not usually cause symptoms. But it can be serious.  When your doctor or nurse tells you your blood pressure, they say 2 numbers. For instance, your doctor or nurse might say that your blood pressure is \"130 over 80.\" The top number is the pressure inside your arteries when your heart is tracy. The bottom number is the pressure inside your arteries when your heart is relaxed.  \"Elevated blood pressure\" is a term doctors or nurses use as a warning. People with elevated blood pressure do not yet have high blood pressure. But their blood pressure is not as low as it should be for good health.  Many experts define high, elevated, and normal blood pressure as follows:   High - Top number of 130 or above and/or bottom number of 80 or above.   Elevated - Top number between 120 and 129 and bottom number of 79 or below.   Normal - Top number of 119 or below and bottom number of 79 or below.  This information is also in the table (table 1).  How can I lower my blood pressure? -- If your doctor or nurse prescribed blood pressure medicine, the most important thing you can do is to take it. If it causes side effects, do not just stop taking it. Instead, talk to your doctor or nurse about the problems it causes. They might be able to lower your dose or switch you to another medicine. If cost is a problem, mention that, too. They might be able to put you on a less expensive medicine. Taking your blood pressure medicine can keep you from having a heart attack or stroke, and it can save your life!  Can I do anything on my own? -- You have a lot of control over your blood pressure. To lower it:   Lose weight (if you are overweight).   Choose a diet low in fat and rich in " "fruits, vegetables, and low-fat dairy products.   Eat less salt.   Do something active for at least 30 minutes a day on most days of the week.   Drink less alcohol (if you drink more than 2 alcoholic drinks per day).  It's also a good idea to get a home blood pressure meter. People who check their own blood pressure at home do better at keeping it low and can sometimes even reduce the amount of medicine they take.  All topics are updated as new evidence becomes available and our peer review process is complete.  This topic retrieved from [employer] on: Feb 26, 2024.  Topic 37211 Version 23.0  Release: 32.2.4 - C32.56  © 2024 UpToDate, Inc. and/or its affiliates. All rights reserved.  table 1: Definition of normal and high blood pressure  Level  Top number  Bottom number    High 130 or above 80 or above   Elevated 120 to 129 79 or below   Normal 119 or below 79 or below   These definitions are from the American College of Cardiology/American Heart Association. Other expert groups might use slightly different definitions.  \"Elevated blood pressure\" is a term doctor or nurses use as a warning. It means you do not yet have high blood pressure, but your blood pressure is not as low as it should be for good health.  Graphic 77946 Version 6.0  Consumer Information Use and Disclaimer   Disclaimer: This generalized information is a limited summary of diagnosis, treatment, and/or medication information. It is not meant to be comprehensive and should be used as a tool to help the user understand and/or assess potential diagnostic and treatment options. It does NOT include all information about conditions, treatments, medications, side effects, or risks that may apply to a specific patient. It is not intended to be medical advice or a substitute for the medical advice, diagnosis, or treatment of a health care provider based on the health care provider's examination and assessment of a patient's specific and unique circumstances. " Patients must speak with a health care provider for complete information about their health, medical questions, and treatment options, including any risks or benefits regarding use of medications. This information does not endorse any treatments or medications as safe, effective, or approved for treating a specific patient. UpToDate, Inc. and its affiliates disclaim any warranty or liability relating to this information or the use thereof.The use of this information is governed by the Terms of Use, available at https://www.woltersLesConciergesuwer.com/en/know/clinical-effectiveness-terms. 2024© UpToDate, Inc. and its affiliates and/or licensors. All rights reserved.  Copyright   © 2024 UpToDate, Inc. and/or its affiliates. All rights reserved.

## 2024-09-06 ENCOUNTER — APPOINTMENT (OUTPATIENT)
Dept: LAB | Facility: CLINIC | Age: 69
End: 2024-09-06
Payer: MEDICARE

## 2024-09-06 DIAGNOSIS — C78.00 MALIGNANT NEOPLASM METASTATIC TO LUNG, UNSPECIFIED LATERALITY (HCC): ICD-10-CM

## 2024-09-06 DIAGNOSIS — M89.9 DISORDER OF BONE, UNSPECIFIED: ICD-10-CM

## 2024-09-06 DIAGNOSIS — I10 BENIGN ESSENTIAL HYPERTENSION: ICD-10-CM

## 2024-09-06 DIAGNOSIS — E78.2 MIXED HYPERLIPIDEMIA: ICD-10-CM

## 2024-09-06 DIAGNOSIS — Z12.5 SCREENING FOR PROSTATE CANCER: ICD-10-CM

## 2024-09-06 DIAGNOSIS — Z13.220 SCREENING FOR LIPID DISORDERS: ICD-10-CM

## 2024-09-06 DIAGNOSIS — E03.9 ACQUIRED HYPOTHYROIDISM: ICD-10-CM

## 2024-09-06 DIAGNOSIS — I48.0 PAROXYSMAL ATRIAL FIBRILLATION (HCC): ICD-10-CM

## 2024-09-06 DIAGNOSIS — Z13.29 SCREENING FOR THYROID DISORDER: ICD-10-CM

## 2024-09-06 DIAGNOSIS — E66.3 OVERWEIGHT (BMI 25.0-29.9): ICD-10-CM

## 2024-09-06 DIAGNOSIS — M15.9 GENERALIZED OSTEOARTHRITIS: ICD-10-CM

## 2024-09-06 DIAGNOSIS — C79.31 METASTASIS TO BRAIN (HCC): ICD-10-CM

## 2024-09-06 DIAGNOSIS — H35.81 MACULAR EDEMA: ICD-10-CM

## 2024-09-06 DIAGNOSIS — D50.8 IRON DEFICIENCY ANEMIA SECONDARY TO INADEQUATE DIETARY IRON INTAKE: ICD-10-CM

## 2024-09-06 DIAGNOSIS — C79.51 MALIGNANT NEOPLASM METASTATIC TO BONE (HCC): ICD-10-CM

## 2024-09-06 DIAGNOSIS — Z13.1 SCREENING FOR DIABETES MELLITUS (DM): ICD-10-CM

## 2024-09-06 DIAGNOSIS — R56.9 FOCAL SEIZURES (HCC): ICD-10-CM

## 2024-09-06 DIAGNOSIS — Z13.0 SCREENING FOR DEFICIENCY ANEMIA: ICD-10-CM

## 2024-09-06 DIAGNOSIS — C64.9 MALIGNANT NEOPLASM OF KIDNEY, UNSPECIFIED LATERALITY (HCC): ICD-10-CM

## 2024-09-06 LAB
25(OH)D3 SERPL-MCNC: 28.5 NG/ML (ref 30–100)
ALBUMIN SERPL BCG-MCNC: 4.6 G/DL (ref 3.5–5)
ALP SERPL-CCNC: 85 U/L (ref 34–104)
ALT SERPL W P-5'-P-CCNC: 15 U/L (ref 7–52)
ANION GAP SERPL CALCULATED.3IONS-SCNC: 8 MMOL/L (ref 4–13)
AST SERPL W P-5'-P-CCNC: 17 U/L (ref 13–39)
BASOPHILS # BLD AUTO: 0.03 THOUSANDS/ÂΜL (ref 0–0.1)
BASOPHILS NFR BLD AUTO: 1 % (ref 0–1)
BILIRUB SERPL-MCNC: 0.74 MG/DL (ref 0.2–1)
BUN SERPL-MCNC: 24 MG/DL (ref 5–25)
CALCIUM SERPL-MCNC: 9.5 MG/DL (ref 8.4–10.2)
CHLORIDE SERPL-SCNC: 102 MMOL/L (ref 96–108)
CHOLEST SERPL-MCNC: 163 MG/DL
CO2 SERPL-SCNC: 28 MMOL/L (ref 21–32)
CREAT SERPL-MCNC: 1.03 MG/DL (ref 0.6–1.3)
EOSINOPHIL # BLD AUTO: 0.13 THOUSAND/ÂΜL (ref 0–0.61)
EOSINOPHIL NFR BLD AUTO: 2 % (ref 0–6)
ERYTHROCYTE [DISTWIDTH] IN BLOOD BY AUTOMATED COUNT: 12.1 % (ref 11.6–15.1)
GFR SERPL CREATININE-BSD FRML MDRD: 74 ML/MIN/1.73SQ M
GLUCOSE P FAST SERPL-MCNC: 85 MG/DL (ref 65–99)
HCT VFR BLD AUTO: 40.8 % (ref 36.5–49.3)
HDLC SERPL-MCNC: 47 MG/DL
HGB BLD-MCNC: 13.4 G/DL (ref 12–17)
IMM GRANULOCYTES # BLD AUTO: 0.01 THOUSAND/UL (ref 0–0.2)
IMM GRANULOCYTES NFR BLD AUTO: 0 % (ref 0–2)
LDLC SERPL CALC-MCNC: 99 MG/DL (ref 0–100)
LYMPHOCYTES # BLD AUTO: 1.21 THOUSANDS/ÂΜL (ref 0.6–4.47)
LYMPHOCYTES NFR BLD AUTO: 21 % (ref 14–44)
MCH RBC QN AUTO: 30.1 PG (ref 26.8–34.3)
MCHC RBC AUTO-ENTMCNC: 32.8 G/DL (ref 31.4–37.4)
MCV RBC AUTO: 92 FL (ref 82–98)
MONOCYTES # BLD AUTO: 0.67 THOUSAND/ÂΜL (ref 0.17–1.22)
MONOCYTES NFR BLD AUTO: 12 % (ref 4–12)
NEUTROPHILS # BLD AUTO: 3.68 THOUSANDS/ÂΜL (ref 1.85–7.62)
NEUTS SEG NFR BLD AUTO: 64 % (ref 43–75)
NRBC BLD AUTO-RTO: 0 /100 WBCS
PLATELET # BLD AUTO: 172 THOUSANDS/UL (ref 149–390)
PMV BLD AUTO: 10.6 FL (ref 8.9–12.7)
POTASSIUM SERPL-SCNC: 4.7 MMOL/L (ref 3.5–5.3)
PROT SERPL-MCNC: 7.1 G/DL (ref 6.4–8.4)
PSA SERPL-MCNC: 1.39 NG/ML (ref 0–4)
RBC # BLD AUTO: 4.45 MILLION/UL (ref 3.88–5.62)
SODIUM SERPL-SCNC: 138 MMOL/L (ref 135–147)
TRIGL SERPL-MCNC: 87 MG/DL
WBC # BLD AUTO: 5.73 THOUSAND/UL (ref 4.31–10.16)

## 2024-09-06 PROCEDURE — 85025 COMPLETE CBC W/AUTO DIFF WBC: CPT

## 2024-09-06 PROCEDURE — 80061 LIPID PANEL: CPT

## 2024-09-06 PROCEDURE — 36415 COLL VENOUS BLD VENIPUNCTURE: CPT

## 2024-09-06 PROCEDURE — G0103 PSA SCREENING: HCPCS

## 2024-09-06 PROCEDURE — 82306 VITAMIN D 25 HYDROXY: CPT

## 2024-09-06 PROCEDURE — 80053 COMPREHEN METABOLIC PANEL: CPT

## 2024-09-09 ENCOUNTER — TELEPHONE (OUTPATIENT)
Dept: INTERNAL MEDICINE CLINIC | Facility: CLINIC | Age: 69
End: 2024-09-09

## 2024-09-09 NOTE — TELEPHONE ENCOUNTER
----- Message from Jonny Rahman DO sent at 9/7/2024  7:40 AM EDT -----  Call pt labs are ok except for low vit d. Increase replacement.

## 2024-09-23 DIAGNOSIS — R35.1 BENIGN PROSTATIC HYPERPLASIA WITH NOCTURIA: ICD-10-CM

## 2024-09-23 DIAGNOSIS — N40.1 BENIGN PROSTATIC HYPERPLASIA WITH NOCTURIA: ICD-10-CM

## 2024-09-23 RX ORDER — TAMSULOSIN HYDROCHLORIDE 0.4 MG/1
0.4 CAPSULE ORAL
Qty: 90 CAPSULE | Refills: 0 | Status: SHIPPED | OUTPATIENT
Start: 2024-09-23

## 2024-09-23 NOTE — TELEPHONE ENCOUNTER
Reason for call:   [x] Refill   [] Prior Auth  [] Other:     Office:   [x] PCP/Provider -   [] Specialty/Provider -     Medication: tamsulosin    Dose/Frequency: dailt    Quantity: 90    [facility]    Does the patient have enough for 3 days?   [x] Yes   [] No - Send as HP to POD

## 2024-10-10 ENCOUNTER — IMMUNIZATIONS (OUTPATIENT)
Dept: INTERNAL MEDICINE CLINIC | Facility: CLINIC | Age: 69
End: 2024-10-10
Payer: MEDICARE

## 2024-10-10 DIAGNOSIS — Z23 ENCOUNTER FOR IMMUNIZATION: Primary | ICD-10-CM

## 2024-10-10 PROCEDURE — 90662 IIV NO PRSV INCREASED AG IM: CPT

## 2024-10-10 PROCEDURE — G0008 ADMIN INFLUENZA VIRUS VAC: HCPCS

## 2024-11-13 NOTE — PROGRESS NOTES
Assessment/Plan:  Problem List Items Addressed This Visit          Cardiovascular and Mediastinum    Paroxysmal atrial fibrillation (HCC)    Benign essential hypertension - Primary       Endocrine    Hypothyroidism       Genitourinary    Malignant neoplasm of kidney (HCC)       Behavioral Health    Generalized anxiety disorder       Blood    Iron deficiency anemia secondary to inadequate dietary iron intake       Eye    Macular edema       Neurology/Sleep    History of TIA (transient ischemic attack)       Other    Overweight (BMI 25.0-29.9)    Hyperlipidemia        Diagnoses and all orders for this visit:    Benign essential hypertension    Paroxysmal atrial fibrillation (HCC)    Acquired hypothyroidism    Malignant neoplasm of kidney, unspecified laterality (HCC)    Generalized anxiety disorder    Iron deficiency anemia secondary to inadequate dietary iron intake    Macular edema    History of TIA (transient ischemic attack)    Mixed hyperlipidemia    Overweight (BMI 25.0-29.9)        No problem-specific Assessment & Plan notes found for this encounter.    A/P: Doing ok and labs are up to date. Appreciate optho input. Continue current treatment and keep f/u with the specialist. RTC four months for routine.     Subjective:      Patient ID: Ankit Beal is a 68 y.o. male.     RTC For f/u HTN, PAF, etc. Doing ok and no new issues. Remains active w/o difficulty and no falls. Denies CP, SOB, palpitations, edema, orthopnea or PND. AMD no worse, but also no better. No stroke like events. Renal Cell Ca is in remission. EMBER is controlled. Labs are up to date        The following portions of the patient's history were reviewed and updated as appropriate:   He has a past medical history of Abnormal eye finding, Anemia, Atrial fibrillation (HCC), Colon polyp, Focal seizures (HCC) (1/4/2024), Generalized anxiety disorder, History of kidney cancer, Hyperlipidemia, Hypertension, and TIA (transient ischemic attack).,  does  not have any pertinent problems on file.,   has a past surgical history that includes External ear surgery; Kidney surgery; Tibia fracture surgery; Colonoscopy (N/A, 3/10/2017); Tonsillectomy (childhood); and Tooth extraction (08/11/2021).,  family history includes Cancer in his paternal uncle; No Known Problems in his mother.,   reports that he quit smoking about 25 years ago. His smoking use included cigarettes. He has never been exposed to tobacco smoke. He has never used smokeless tobacco. He reports that he does not drink alcohol and does not use drugs.,  is allergic to prednisone..  Current Outpatient Medications   Medication Sig Dispense Refill    aflibercept (Eylea) 2 MG/0.05ML SOLN by Intravitreal route      Ascorbic Acid (vitamin C) 100 MG tablet Take 100 mg by mouth daily      aspirin 81 mg chewable tablet Two tabs po q day. (Patient taking differently: Chew 81 mg once Two tabs po q day.) 30 tablet 0    Bevacizumab (AVASTIN IV) Infuse into a venous catheter Every 4 weeks       sertraline (ZOLOFT) 50 mg tablet Take 1 tablet (50 mg total) by mouth daily 90 tablet 3    tamsulosin (FLOMAX) 0.4 mg Take 1 capsule (0.4 mg total) by mouth daily with dinner 90 capsule 1     No current facility-administered medications for this visit.       Review of Systems   Constitutional:  Negative for activity change, chills, diaphoresis, fatigue and fever.   HENT: Negative.     Eyes:  Positive for visual disturbance.   Respiratory:  Negative for cough, chest tightness, shortness of breath and wheezing.    Cardiovascular:  Negative for chest pain, palpitations and leg swelling.   Gastrointestinal:  Negative for abdominal pain, constipation, diarrhea, nausea and vomiting.   Endocrine: Negative for cold intolerance and heat intolerance.   Genitourinary:  Negative for difficulty urinating, dysuria and frequency.   Musculoskeletal:  Negative for arthralgias, gait problem and myalgias.   Neurological:  Negative for dizziness,  "seizures, syncope, weakness, light-headedness and headaches.   Psychiatric/Behavioral:  Negative for confusion, dysphoric mood and sleep disturbance. The patient is not nervous/anxious.        PHQ-2/9 Depression Screening            Objective:  Vitals:    04/08/24 1451   BP: 120/70   Pulse: 70   Temp: 98.9 °F (37.2 °C)   SpO2: 98%   Weight: 80.7 kg (178 lb)   Height: 5' 8\" (1.727 m)     Body mass index is 27.06 kg/m².     Physical Exam  Vitals and nursing note reviewed.   Constitutional:       General: He is not in acute distress.     Appearance: Normal appearance. He is not ill-appearing.   HENT:      Head: Normocephalic and atraumatic.      Mouth/Throat:      Mouth: Mucous membranes are moist.   Eyes:      Extraocular Movements: Extraocular movements intact.      Conjunctiva/sclera: Conjunctivae normal.      Pupils: Pupils are equal, round, and reactive to light.   Neck:      Vascular: No carotid bruit.   Cardiovascular:      Rate and Rhythm: Normal rate and regular rhythm.      Heart sounds: Normal heart sounds. No murmur heard.  Pulmonary:      Effort: Pulmonary effort is normal. No respiratory distress.      Breath sounds: Normal breath sounds. No wheezing, rhonchi or rales.   Abdominal:      General: Bowel sounds are normal. There is no distension.      Palpations: Abdomen is soft.      Tenderness: There is no abdominal tenderness.   Musculoskeletal:      Cervical back: Neck supple.      Right lower leg: No edema.      Left lower leg: No edema.   Neurological:      General: No focal deficit present.      Mental Status: He is alert and oriented to person, place, and time. Mental status is at baseline.   Psychiatric:         Mood and Affect: Mood normal.         Behavior: Behavior normal.         Thought Content: Thought content normal.         Judgment: Judgment normal.         " normal

## 2024-12-19 ENCOUNTER — OFFICE VISIT (OUTPATIENT)
Dept: INTERNAL MEDICINE CLINIC | Facility: CLINIC | Age: 69
End: 2024-12-19
Payer: MEDICARE

## 2024-12-19 VITALS
HEIGHT: 68 IN | WEIGHT: 175.6 LBS | TEMPERATURE: 97.6 F | OXYGEN SATURATION: 99 % | BODY MASS INDEX: 26.61 KG/M2 | HEART RATE: 69 BPM | DIASTOLIC BLOOD PRESSURE: 62 MMHG | SYSTOLIC BLOOD PRESSURE: 122 MMHG

## 2024-12-19 DIAGNOSIS — R56.9 FOCAL SEIZURES (HCC): ICD-10-CM

## 2024-12-19 DIAGNOSIS — C64.9 MALIGNANT NEOPLASM OF KIDNEY, UNSPECIFIED LATERALITY (HCC): ICD-10-CM

## 2024-12-19 DIAGNOSIS — M15.9 GENERALIZED OSTEOARTHRITIS: ICD-10-CM

## 2024-12-19 DIAGNOSIS — Z00.00 MEDICARE ANNUAL WELLNESS VISIT, SUBSEQUENT: ICD-10-CM

## 2024-12-19 DIAGNOSIS — E66.3 OVERWEIGHT (BMI 25.0-29.9): ICD-10-CM

## 2024-12-19 DIAGNOSIS — I10 BENIGN ESSENTIAL HYPERTENSION: Primary | ICD-10-CM

## 2024-12-19 DIAGNOSIS — D50.8 IRON DEFICIENCY ANEMIA SECONDARY TO INADEQUATE DIETARY IRON INTAKE: ICD-10-CM

## 2024-12-19 DIAGNOSIS — Z23 ENCOUNTER FOR IMMUNIZATION: ICD-10-CM

## 2024-12-19 DIAGNOSIS — E78.2 MIXED HYPERLIPIDEMIA: ICD-10-CM

## 2024-12-19 DIAGNOSIS — Z86.73 HISTORY OF TIA (TRANSIENT ISCHEMIC ATTACK): ICD-10-CM

## 2024-12-19 DIAGNOSIS — I68.0 CEREBRAL AMYLOID ANGIOPATHY (CODE): ICD-10-CM

## 2024-12-19 DIAGNOSIS — I48.0 PAROXYSMAL ATRIAL FIBRILLATION (HCC): ICD-10-CM

## 2024-12-19 DIAGNOSIS — F41.1 GENERALIZED ANXIETY DISORDER: ICD-10-CM

## 2024-12-19 DIAGNOSIS — E03.9 ACQUIRED HYPOTHYROIDISM: ICD-10-CM

## 2024-12-19 PROCEDURE — 90677 PCV20 VACCINE IM: CPT

## 2024-12-19 PROCEDURE — 99214 OFFICE O/P EST MOD 30 MIN: CPT | Performed by: INTERNAL MEDICINE

## 2024-12-19 PROCEDURE — G0009 ADMIN PNEUMOCOCCAL VACCINE: HCPCS

## 2024-12-19 PROCEDURE — G0439 PPPS, SUBSEQ VISIT: HCPCS | Performed by: INTERNAL MEDICINE

## 2024-12-19 NOTE — PATIENT INSTRUCTIONS
"Patient Education     High blood pressure in adults   The Basics   Written by the doctors and editors at [facility]   What is high blood pressure? -- High blood pressure is a condition that puts you at risk for heart attack, stroke, and kidney disease. It does not usually cause symptoms. But it can be serious.  When your doctor or nurse tells you your blood pressure, they say 2 numbers. For instance, your doctor or nurse might say that your blood pressure is \"130 over 80.\" The top number is the pressure inside your arteries when your heart is tracy. The bottom number is the pressure inside your arteries when your heart is relaxed.  \"Elevated blood pressure\" is a term doctors or nurses use as a warning. People with elevated blood pressure do not yet have high blood pressure. But their blood pressure is not as low as it should be for good health.  Many experts define high, elevated, and normal blood pressure as follows:   High - Top number of 130 or above and/or bottom number of 80 or above.   Elevated - Top number between 120 and 129 and bottom number of 79 or below.   Normal - Top number of 119 or below and bottom number of 79 or below.  This information is also in the table (table 1).  How can I lower my blood pressure? -- If your doctor or nurse prescribed blood pressure medicine, the most important thing you can do is to take it. If it causes side effects, do not just stop taking it. Instead, talk to your doctor or nurse about the problems it causes. They might be able to lower your dose or switch you to another medicine. If cost is a problem, mention that, too. They might be able to put you on a less expensive medicine. Taking your blood pressure medicine can keep you from having a heart attack or stroke, and it can save your life!  Can I do anything on my own? -- You have a lot of control over your blood pressure. To lower it:   Lose weight (if you are overweight).   Choose a diet low in fat and rich in " "fruits, vegetables, and low-fat dairy products.   Eat less salt.   Do something active for at least 30 minutes a day on most days of the week.   Drink less alcohol (if you drink more than 2 alcoholic drinks per day).  It's also a good idea to get a home blood pressure meter. People who check their own blood pressure at home do better at keeping it low and can sometimes even reduce the amount of medicine they take.  All topics are updated as new evidence becomes available and our peer review process is complete.  This topic retrieved from [employer] on: Feb 26, 2024.  Topic 97286 Version 23.0  Release: 32.2.4 - C32.56  © 2024 UpToDate, Inc. and/or its affiliates. All rights reserved.  table 1: Definition of normal and high blood pressure  Level  Top number  Bottom number    High 130 or above 80 or above   Elevated 120 to 129 79 or below   Normal 119 or below 79 or below   These definitions are from the American College of Cardiology/American Heart Association. Other expert groups might use slightly different definitions.  \"Elevated blood pressure\" is a term doctor or nurses use as a warning. It means you do not yet have high blood pressure, but your blood pressure is not as low as it should be for good health.  Graphic 56071 Version 6.0  Consumer Information Use and Disclaimer   Disclaimer: This generalized information is a limited summary of diagnosis, treatment, and/or medication information. It is not meant to be comprehensive and should be used as a tool to help the user understand and/or assess potential diagnostic and treatment options. It does NOT include all information about conditions, treatments, medications, side effects, or risks that may apply to a specific patient. It is not intended to be medical advice or a substitute for the medical advice, diagnosis, or treatment of a health care provider based on the health care provider's examination and assessment of a patient's specific and unique circumstances. " Patients must speak with a health care provider for complete information about their health, medical questions, and treatment options, including any risks or benefits regarding use of medications. This information does not endorse any treatments or medications as safe, effective, or approved for treating a specific patient. UpToDate, Inc. and its affiliates disclaim any warranty or liability relating to this information or the use thereof.The use of this information is governed by the Terms of Use, available at https://www.[employer]com/en/know/clinical-effectiveness-terms. 2024© UpToDate, Inc. and its affiliates and/or licensors. All rights reserved.  Copyright   © 2024 UpToDate, Inc. and/or its affiliates. All rights reserved.    Medicare Preventive Visit Patient Instructions  Thank you for completing your Welcome to Medicare Visit or Medicare Annual Wellness Visit today. Your next wellness visit will be due in one year (12/20/2025).  The screening/preventive services that you may require over the next 5-10 years are detailed below. Some tests may not apply to you based off risk factors and/or age. Screening tests ordered at today's visit but not completed yet may show as past due. Also, please note that scanned in results may not display below.  Preventive Screenings:  Service Recommendations Previous Testing/Comments   Colorectal Cancer Screening  Colonoscopy    Fecal Occult Blood Test (FOBT)/Fecal Immunochemical Test (FIT)  Fecal DNA/Cologuard Test  Flexible Sigmoidoscopy Age: 45-75 years old   Colonoscopy: every 10 years (May be performed more frequently if at higher risk)  OR  FOBT/FIT: every 1 year  OR  Cologuard: every 3 years  OR  Sigmoidoscopy: every 5 years  Screening may be recommended earlier than age 45 if at higher risk for colorectal cancer. Also, an individualized decision between you and your healthcare provider will decide whether screening between the ages of 76-85 would be appropriate.  Colonoscopy: 09/14/2022  FOBT/FIT: Not on file  Cologuard: Not on file  Sigmoidoscopy: Not on file    Screening Current     Prostate Cancer Screening Individualized decision between patient and health care provider in men between ages of 55-69   Medicare will cover every 12 months beginning on the day after your 50th birthday PSA: 1.392 ng/mL     Screening Current     Hepatitis C Screening Once for adults born between 1945 and 1965  More frequently in patients at high risk for Hepatitis C Hep C Antibody: 09/26/2018    Screening Current   Diabetes Screening 1-2 times per year if you're at risk for diabetes or have pre-diabetes Fasting glucose: 85 mg/dL (9/6/2024)  A1C: 5.6 % (1/12/2024)  Screening Current   Cholesterol Screening Once every 5 years if you don't have a lipid disorder. May order more often based on risk factors. Lipid panel: 09/06/2024  Screening Not Indicated  History Lipid Disorder      Other Preventive Screenings Covered by Medicare:  Abdominal Aortic Aneurysm (AAA) Screening: covered once if your at risk. You're considered to be at risk if you have a family history of AAA or a male between the age of 65-75 who smoking at least 100 cigarettes in your lifetime.  Lung Cancer Screening: covers low dose CT scan once per year if you meet all of the following conditions: (1) Age 55-77; (2) No signs or symptoms of lung cancer; (3) Current smoker or have quit smoking within the last 15 years; (4) You have a tobacco smoking history of at least 20 pack years (packs per day x number of years you smoked); (5) You get a written order from a healthcare provider.  Glaucoma Screening: covered annually if you're considered high risk: (1) You have diabetes OR (2) Family history of glaucoma OR (3)  aged 50 and older OR (4)  American aged 65 and older  Osteoporosis Screening: covered every 2 years if you meet one of the following conditions: (1) Have a vertebral abnormality; (2) On glucocorticoid  therapy for more than 3 months; (3) Have primary hyperparathyroidism; (4) On osteoporosis medications and need to assess response to drug therapy.  HIV Screening: covered annually if you're between the age of 15-65. Also covered annually if you are younger than 15 and older than 65 with risk factors for HIV infection. For pregnant patients, it is covered up to 3 times per pregnancy.    Immunizations:  Immunization Recommendations   Influenza Vaccine Annual influenza vaccination during flu season is recommended for all persons aged >= 6 months who do not have contraindications   Pneumococcal Vaccine   * Pneumococcal conjugate vaccine = PCV13 (Prevnar 13), PCV15 (Vaxneuvance), PCV20 (Prevnar 20)  * Pneumococcal polysaccharide vaccine = PPSV23 (Pneumovax) Adults 19-65 yo with certain risk factors or if 65+ yo  If never received any pneumonia vaccine: recommend Prevnar 20 (PCV20)  Give PCV20 if previously received 1 dose of PCV13 or PPSV23   Hepatitis B Vaccine 3 dose series if at intermediate or high risk (ex: diabetes, end stage renal disease, liver disease)   Respiratory syncytial virus (RSV) Vaccine - COVERED BY MEDICARE PART D  * RSVPreF3 (Arexvy) CDC recommends that adults 60 years of age and older may receive a single dose of RSV vaccine using shared clinical decision-making (SCDM)   Tetanus (Td) Vaccine - COST NOT COVERED BY MEDICARE PART B Following completion of primary series, a booster dose should be given every 10 years to maintain immunity against tetanus. Td may also be given as tetanus wound prophylaxis.   Tdap Vaccine - COST NOT COVERED BY MEDICARE PART B Recommended at least once for all adults. For pregnant patients, recommended with each pregnancy.   Shingles Vaccine (Shingrix) - COST NOT COVERED BY MEDICARE PART B  2 shot series recommended in those 19 years and older who have or will have weakened immune systems or those 50 years and older     Health Maintenance Due:      Topic Date Due   •  Colorectal Cancer Screening  09/11/2032   • Hepatitis C Screening  Completed     Immunizations Due:      Topic Date Due   • Pneumococcal Vaccine: 65+ Years (1 of 2 - PCV) Never done   • COVID-19 Vaccine (4 - 2024-25 season) 09/01/2024     Advance Directives   What are advance directives?  Advance directives are legal documents that state your wishes and plans for medical care. These plans are made ahead of time in case you lose your ability to make decisions for yourself. Advance directives can apply to any medical decision, such as the treatments you want, and if you want to donate organs.   What are the types of advance directives?  There are many types of advance directives, and each state has rules about how to use them. You may choose a combination of any of the following:  Living will:  This is a written record of the treatment you want. You can also choose which treatments you do not want, which to limit, and which to stop at a certain time. This includes surgery, medicine, IV fluid, and tube feedings.   Durable power of  for healthcare (DPAHC):  This is a written record that states who you want to make healthcare choices for you when you are unable to make them for yourself. This person, called a proxy, is usually a family member or a friend. You may choose more than 1 proxy.  Do not resuscitate (DNR) order:  A DNR order is used in case your heart stops beating or you stop breathing. It is a request not to have certain forms of treatment, such as CPR. A DNR order may be included in other types of advance directives.  Medical directive:  This covers the care that you want if you are in a coma, near death, or unable to make decisions for yourself. You can list the treatments you want for each condition. Treatment may include pain medicine, surgery, blood transfusions, dialysis, IV or tube feedings, and a ventilator (breathing machine).  Values history:  This document has questions about your views,  beliefs, and how you feel and think about life. This information can help others choose the care that you would choose.  Why are advance directives important?  An advance directive helps you control your care. Although spoken wishes may be used, it is better to have your wishes written down. Spoken wishes can be misunderstood, or not followed. Treatments may be given even if you do not want them. An advance directive may make it easier for your family to make difficult choices about your care.   Fall Prevention    Fall prevention  includes ways to make your home and other areas safer. It also includes ways you can move more carefully to prevent a fall. Health conditions that cause changes in your blood pressure, vision, or muscle strength and coordination may increase your risk for falls. Medicines may also increase your risk for falls if they make you dizzy, weak, or sleepy.   Fall prevention tips:   Stand or sit up slowly.    Use assistive devices as directed.    Wear shoes that fit well and have soles that .    Wear a personal alarm.    Stay active.    Manage your medical conditions.    Home Safety Tips:  Add items to prevent falls in the bathroom.    Keep paths clear.    Install bright lights in your home.    Keep items you use often on shelves within reach.    Paint or place reflective tape on the edges of your stairs.    Weight Management   Why it is important to manage your weight:  Being overweight increases your risk of health conditions such as heart disease, high blood pressure, type 2 diabetes, and certain types of cancer. It can also increase your risk for osteoarthritis, sleep apnea, and other respiratory problems. Aim for a slow, steady weight loss. Even a small amount of weight loss can lower your risk of health problems.  How to lose weight safely:  A safe and healthy way to lose weight is to eat fewer calories and get regular exercise. You can lose up about 1 pound a week by decreasing the  number of calories you eat by 500 calories each day.   Healthy meal plan for weight management:  A healthy meal plan includes a variety of foods, contains fewer calories, and helps you stay healthy. A healthy meal plan includes the following:  Eat whole-grain foods more often.  A healthy meal plan should contain fiber. Fiber is the part of grains, fruits, and vegetables that is not broken down by your body. Whole-grain foods are healthy and provide extra fiber in your diet. Some examples of whole-grain foods are whole-wheat breads and pastas, oatmeal, brown rice, and bulgur.  Eat a variety of vegetables every day.  Include dark, leafy greens such as spinach, kale, lyndsay greens, and mustard greens. Eat yellow and orange vegetables such as carrots, sweet potatoes, and winter squash.   Eat a variety of fruits every day.  Choose fresh or canned fruit (canned in its own juice or light syrup) instead of juice. Fruit juice has very little or no fiber.  Eat low-fat dairy foods.  Drink fat-free (skim) milk or 1% milk. Eat fat-free yogurt and low-fat cottage cheese. Try low-fat cheeses such as mozzarella and other reduced-fat cheeses.  Choose meat and other protein foods that are low in fat.  Choose beans or other legumes such as split peas or lentils. Choose fish, skinless poultry (chicken or turkey), or lean cuts of red meat (beef or pork). Before you cook meat or poultry, cut off any visible fat.   Use less fat and oil.  Try baking foods instead of frying them. Add less fat, such as margarine, sour cream, regular salad dressing and mayonnaise to foods. Eat fewer high-fat foods. Some examples of high-fat foods include french fries, doughnuts, ice cream, and cakes.  Eat fewer sweets.  Limit foods and drinks that are high in sugar. This includes candy, cookies, regular soda, and sweetened drinks.  Exercise:  Exercise at least 30 minutes per day on most days of the week. Some examples of exercise include walking, biking,  dancing, and swimming. You can also fit in more physical activity by taking the stairs instead of the elevator or parking farther away from stores. Ask your healthcare provider about the best exercise plan for you.      © Copyright [employer] 2018 Information is for End User's use only and may not be sold, redistributed or otherwise used for commercial purposes. All illustrations and images included in CareNotes® are the copyrighted property of [employer] Inc[employer] or [employer]

## 2024-12-19 NOTE — PROGRESS NOTES
Name: Ankit Beal      : 1955      MRN: [id number]  Encounter Provider: Jonny Rahman DO  Encounter Date: 2024   Encounter department: [facility]    Assessment & Plan  Encounter for immunization    Orders:    Pneumococcal Conjugate Vaccine 20-valent (Pcv20)    Benign essential hypertension         Cerebral amyloid angiopathy (CODE)         Paroxysmal atrial fibrillation (HCC)         Acquired hypothyroidism         Generalized osteoarthritis         Malignant neoplasm of kidney, unspecified laterality (HCC)         Generalized anxiety disorder         Iron deficiency anemia secondary to inadequate dietary iron intake         Focal seizures (HCC)         History of TIA (transient ischemic attack)         Mixed hyperlipidemia         Overweight (BMI 25.0-29.9)         Medicare annual wellness visit, subsequent            Preventive health issues were discussed with patient, and age appropriate screening tests were ordered as noted in patient's After Visit Summary. Personalized health advice and appropriate referrals for health education or preventive services given if needed, as noted in patient's After Visit Summary.    History of Present Illness     HPI   Patient Care Team:  Jonny Rahman DO as PCP - General (Internal Medicine)  MD Ezio Baker MD as Endoscopist    Review of Systems  Medical History Reviewed by provider this encounter:  Tobacco  Allergies  Meds  Problems  Med Hx  Surg Hx  Fam Hx       Annual Wellness Visit Questionnaire   Ankit is here for his Subsequent Wellness visit. Last Medicare Wellness visit information reviewed, patient interviewed and updates made to the record today.      Health Risk Assessment:   Patient rates overall health as good. Patient feels that their physical health rating is same. Patient is very satisfied with their life. Eyesight was rated as same. Hearing was rated as same. Patient feels that their emotional and  mental health rating is same. Patients states they are sometimes angry. Patient states they are sometimes unusually tired/fatigued. Pain experienced in the last 7 days has been none. Patient states that he has experienced no weight loss or gain in last 6 months.     Depression Screening:   PHQ-2 Score: 0      Fall Risk Screening:   In the past year, patient has experienced: history of falling in past year    Number of falls: 1  Injured during fall?: No    Feels unsteady when standing or walking?: Yes    Worried about falling?: No      Home Safety:  Patient does not have trouble with stairs inside or outside of their home. Patient has working smoke alarms and has working carbon monoxide detector. Home safety hazards include: none.     Nutrition:   Current diet is Regular.     Medications:   Patient is currently taking over-the-counter supplements. OTC medications include: see medication list. Patient is able to manage medications.     Activities of Daily Living (ADLs)/Instrumental Activities of Daily Living (IADLs):   Walk and transfer into and out of bed and chair?: Yes  Dress and groom yourself?: Yes    Bathe or shower yourself?: Yes    Feed yourself? Yes  Do your laundry/housekeeping?: Yes  Manage your money, pay your bills and track your expenses?: Yes  Make your own meals?: Yes    Do your own shopping?: Yes    Previous Hospitalizations:   Any hospitalizations or ED visits within the last 12 months?: No      Advance Care Planning:   Living will: Yes    Durable POA for healthcare: Yes    Advanced directive: Yes    Advanced directive counseling given: Yes    Five wishes given: No    Patient declined ACP directive: No    End of Life Decisions reviewed with patient: Yes    Provider agrees with end of life decisions: Yes      Cognitive Screening:   Provider or family/friend/caregiver concerned regarding cognition?: No    PREVENTIVE SCREENINGS      Cardiovascular Screening:    General: Screening Not Indicated and  History Lipid Disorder    Due for: Lipid Panel      Diabetes Screening:     General: Screening Current    Due for: Blood Glucose      Colorectal Cancer Screening:     General: Screening Current      Prostate Cancer Screening:    General: Screening Current      Osteoporosis Screening:    General: Screening Not Indicated      Abdominal Aortic Aneurysm (AAA) Screening:    Risk factors include: age between 65-76 yo and tobacco use        Lung Cancer Screening:     General: Screening Not Indicated      Hepatitis C Screening:    General: Screening Current    Screening, Brief Intervention, and Referral to Treatment (SBIRT)    Screening  Typical number of drinks in a day: 0  Typical number of drinks in a week: 0  Interpretation: Low risk drinking behavior.    Single Item Drug Screening:  How often have you used an illegal drug (including marijuana) or a prescription medication for non-medical reasons in the past year? never    Single Item Drug Screen Score: 0  Interpretation: Negative screen for possible drug use disorder    Other Counseling Topics:   Car/seat belt/driving safety, sunscreen and calcium and vitamin D intake and regular weightbearing exercise.     Social Drivers of Health     Financial Resource Strain: Low Risk  (4/24/2023)    Overall Financial Resource Strain (CARDIA)     Difficulty of Paying Living Expenses: Not hard at all   Food Insecurity: No Food Insecurity (12/19/2024)    Hunger Vital Sign     Worried About Running Out of Food in the Last Year: Never true     Ran Out of Food in the Last Year: Never true   Transportation Needs: No Transportation Needs (12/19/2024)    PRAPARE - Transportation     Lack of Transportation (Medical): No     Lack of Transportation (Non-Medical): No   Housing Stability: Low Risk  (12/19/2024)    Housing Stability Vital Sign     Unable to Pay for Housing in the Last Year: No     Number of Times Moved in the Last Year: 0     Homeless in the Last Year: No   Utilities: Not At Risk  "(12/19/2024)    [facility] Utilities     Threatened with loss of utilities: No     No results found.    Objective   /62   Pulse 69   Temp 97.6 °F (36.4 °C)   Ht 5' 8\" (1.727 m)   Wt 79.7 kg (175 lb 9.6 oz)   SpO2 99%   BMI 26.70 kg/m²     Physical Exam      A/P: Doing well and no falls reported. Denies depression and feels safe at home. Diverse diet. No problems operating a MV and uses seat belts. Has a living will and POA. No DME or referrals needed today. RTC one year for medicare wellness.     "

## 2024-12-19 NOTE — PROGRESS NOTES
Name: Ankit Beal      : 1955      MRN: [id number]  Encounter Provider: Jonny Rahman DO  Encounter Date: 2024   Encounter department: [facility]  :  Assessment & Plan  Encounter for immunization    Orders:    Pneumococcal Conjugate Vaccine 20-valent (Pcv20)    Benign essential hypertension         Cerebral amyloid angiopathy (CODE)         Paroxysmal atrial fibrillation (HCC)         Acquired hypothyroidism         Generalized osteoarthritis         Malignant neoplasm of kidney, unspecified laterality (HCC)         Generalized anxiety disorder         Iron deficiency anemia secondary to inadequate dietary iron intake         Focal seizures (HCC)         History of TIA (transient ischemic attack)         Mixed hyperlipidemia         Overweight (BMI 25.0-29.9)         Medicare annual wellness visit, subsequent           A/P: Doing ok and will check labs. Discussed vaccines and will update his pneumovax. Keep f/u with the specialist. Continue current treatment and RTC six months for routine.      History of Present Illness     WM RTC for f/u HTN, PAF, etc. Doing ok and no new issues. Remains active w/o difficulty and no falls. Denies CP, SOB, palpitations, edema, orthopnea or PND. Renal ca in remission. Still seeing an eye specialist for injections for AMD. EMBER/MDD are controlled. Chronic pain is manageable. No sz activity and no stroke like events. Due for labs and vaccines.       Review of Systems   Constitutional:  Negative for activity change, chills, diaphoresis, fatigue and fever.   HENT: Negative.     Eyes:  Positive for visual disturbance.   Respiratory:  Negative for cough, chest tightness, shortness of breath and wheezing.    Cardiovascular:  Negative for chest pain, palpitations and leg swelling.   Gastrointestinal:  Negative for abdominal pain, constipation, diarrhea, nausea and vomiting.   Endocrine: Negative for cold intolerance and heat intolerance.   Genitourinary:   "Negative for difficulty urinating, dysuria and frequency.   Musculoskeletal:  Negative for arthralgias, gait problem and myalgias.   Neurological:  Negative for dizziness, seizures, syncope, weakness, light-headedness and headaches.   Psychiatric/Behavioral:  Negative for confusion, dysphoric mood and sleep disturbance. The patient is not nervous/anxious.        Objective   /62   Pulse 69   Temp 97.6 °F (36.4 °C)   Ht 5' 8\" (1.727 m)   Wt 79.7 kg (175 lb 9.6 oz)   SpO2 99%   BMI 26.70 kg/m²      Physical Exam  Vitals and nursing note reviewed.   Constitutional:       General: He is not in acute distress.     Appearance: Normal appearance. He is not ill-appearing.   HENT:      Head: Normocephalic and atraumatic.      Mouth/Throat:      Mouth: Mucous membranes are moist.   Eyes:      Extraocular Movements: Extraocular movements intact.      Conjunctiva/sclera: Conjunctivae normal.      Pupils: Pupils are equal, round, and reactive to light.   Neck:      Vascular: No carotid bruit.   Cardiovascular:      Rate and Rhythm: Normal rate. Rhythm irregular.      Heart sounds: Normal heart sounds. No murmur heard.  Pulmonary:      Effort: Pulmonary effort is normal. No respiratory distress.      Breath sounds: Normal breath sounds. No wheezing, rhonchi or rales.   Abdominal:      General: Bowel sounds are normal. There is no distension.      Palpations: Abdomen is soft.      Tenderness: There is no abdominal tenderness.   Musculoskeletal:      Cervical back: Neck supple.      Right lower leg: No edema.      Left lower leg: No edema.   Neurological:      General: No focal deficit present.      Mental Status: He is alert and oriented to person, place, and time. Mental status is at baseline.   Psychiatric:         Mood and Affect: Mood normal.         Behavior: Behavior normal.         Thought Content: Thought content normal.         Judgment: Judgment normal.         "

## 2025-01-14 DIAGNOSIS — R35.1 BENIGN PROSTATIC HYPERPLASIA WITH NOCTURIA: ICD-10-CM

## 2025-01-14 DIAGNOSIS — N40.1 BENIGN PROSTATIC HYPERPLASIA WITH NOCTURIA: ICD-10-CM

## 2025-01-14 NOTE — TELEPHONE ENCOUNTER
Reason for call:   [x] Refill   [] Prior Auth  [] Other:     Office:   [x] PCP/Provider - Jonny Rahman, DO   [] Specialty/Provider -     Medication: tamsulosin (FLOMAX) 0.4 mg / Take 1 capsule (0.4 mg total) by mouth daily with dinner     Pharmacy: [facility] - [place], [place] - [address] [place] [address]     Does the patient have enough for 3 days?   [x] Yes   [] No - Send as HP to POD

## 2025-01-15 RX ORDER — TAMSULOSIN HYDROCHLORIDE 0.4 MG/1
0.4 CAPSULE ORAL
Qty: 90 CAPSULE | Refills: 1 | Status: SHIPPED | OUTPATIENT
Start: 2025-01-15

## 2025-02-12 DIAGNOSIS — F41.9 ANXIETY: ICD-10-CM

## 2025-02-12 NOTE — TELEPHONE ENCOUNTER
Reason for call:   [x] Refill   [] Prior Auth  [] Other:     Office:   [x] PCP/Provider -   [] Specialty/Provider -       sertraline (ZOLOFT) 50 mg tablet 50 mg, Oral, Daily       Quantity: *90    [facility]    Does the patient have enough for 3 days?   [x] Yes   [] No - Send as HP to POD

## 2025-06-19 ENCOUNTER — APPOINTMENT (OUTPATIENT)
Dept: RADIOLOGY | Facility: CLINIC | Age: 70
End: 2025-06-19
Payer: MEDICARE

## 2025-06-19 ENCOUNTER — OFFICE VISIT (OUTPATIENT)
Dept: INTERNAL MEDICINE CLINIC | Facility: CLINIC | Age: 70
End: 2025-06-19
Payer: MEDICARE

## 2025-06-19 VITALS
HEIGHT: 68 IN | SYSTOLIC BLOOD PRESSURE: 118 MMHG | WEIGHT: 176 LBS | HEART RATE: 69 BPM | TEMPERATURE: 97.1 F | DIASTOLIC BLOOD PRESSURE: 80 MMHG | OXYGEN SATURATION: 98 % | BODY MASS INDEX: 26.67 KG/M2

## 2025-06-19 DIAGNOSIS — Z13.29 SCREENING FOR THYROID DISORDER: ICD-10-CM

## 2025-06-19 DIAGNOSIS — Z13.220 SCREENING FOR HYPERLIPIDEMIA: ICD-10-CM

## 2025-06-19 DIAGNOSIS — Z13.0 SCREENING FOR DEFICIENCY ANEMIA: ICD-10-CM

## 2025-06-19 DIAGNOSIS — E78.2 MIXED HYPERLIPIDEMIA: ICD-10-CM

## 2025-06-19 DIAGNOSIS — E55.9 VITAMIN D DEFICIENCY: ICD-10-CM

## 2025-06-19 DIAGNOSIS — Z86.73 HISTORY OF TIA (TRANSIENT ISCHEMIC ATTACK): ICD-10-CM

## 2025-06-19 DIAGNOSIS — I68.0 CEREBRAL AMYLOID ANGIOPATHY (CODE): ICD-10-CM

## 2025-06-19 DIAGNOSIS — C64.9 MALIGNANT NEOPLASM OF KIDNEY, UNSPECIFIED LATERALITY (HCC): ICD-10-CM

## 2025-06-19 DIAGNOSIS — I10 BENIGN ESSENTIAL HYPERTENSION: Primary | ICD-10-CM

## 2025-06-19 DIAGNOSIS — M79.674 GREAT TOE PAIN, RIGHT: ICD-10-CM

## 2025-06-19 DIAGNOSIS — F41.1 GENERALIZED ANXIETY DISORDER: ICD-10-CM

## 2025-06-19 DIAGNOSIS — D50.8 IRON DEFICIENCY ANEMIA SECONDARY TO INADEQUATE DIETARY IRON INTAKE: ICD-10-CM

## 2025-06-19 DIAGNOSIS — R56.9 FOCAL SEIZURES (HCC): ICD-10-CM

## 2025-06-19 DIAGNOSIS — N40.1 BENIGN PROSTATIC HYPERPLASIA WITH NOCTURIA: ICD-10-CM

## 2025-06-19 DIAGNOSIS — R35.1 BENIGN PROSTATIC HYPERPLASIA WITH NOCTURIA: ICD-10-CM

## 2025-06-19 DIAGNOSIS — E03.9 ACQUIRED HYPOTHYROIDISM: ICD-10-CM

## 2025-06-19 DIAGNOSIS — M15.9 GENERALIZED OSTEOARTHRITIS: ICD-10-CM

## 2025-06-19 DIAGNOSIS — Z13.1 SCREENING FOR DIABETES MELLITUS: ICD-10-CM

## 2025-06-19 PROCEDURE — G2211 COMPLEX E/M VISIT ADD ON: HCPCS | Performed by: INTERNAL MEDICINE

## 2025-06-19 PROCEDURE — 73660 X-RAY EXAM OF TOE(S): CPT

## 2025-06-19 PROCEDURE — 99214 OFFICE O/P EST MOD 30 MIN: CPT | Performed by: INTERNAL MEDICINE

## 2025-06-19 RX ORDER — TAMSULOSIN HYDROCHLORIDE 0.4 MG/1
0.4 CAPSULE ORAL
Qty: 90 CAPSULE | Refills: 1 | Status: SHIPPED | OUTPATIENT
Start: 2025-06-19

## 2025-06-19 RX ORDER — AFLIBERCEPT-AYYH 2 MG/.05ML
INJECTION, SOLUTION INTRAVITREAL
COMMUNITY

## 2025-06-19 NOTE — PROGRESS NOTES
Name: Ankit Beal      : 1955      MRN: [id number]  Encounter Provider: Jonny Rahman DO  Encounter Date: 2025   Encounter department: [facility]  :  Assessment & Plan  Benign essential hypertension    Orders:    Albumin / creatinine urine ratio; Future    Comprehensive metabolic panel; Future    Cerebral amyloid angiopathy (CODE)         Acquired hypothyroidism    Orders:    TSH, 3rd generation; Future    Generalized osteoarthritis    Orders:    CBC and differential; Future    Comprehensive metabolic panel; Future    Malignant neoplasm of kidney, unspecified laterality (HCC)         Generalized anxiety disorder    Orders:    TSH, 3rd generation; Future    Iron deficiency anemia secondary to inadequate dietary iron intake        Orders:    CBC and differential; Future    Focal seizures (HCC)         History of TIA (transient ischemic attack)         Mixed hyperlipidemia    Orders:    Comprehensive metabolic panel; Future    LDL cholesterol, direct; Future    Triglycerides; Future    Screening for diabetes mellitus    Orders:    Hemoglobin A1C; Future    Screening for thyroid disorder    Orders:    TSH, 3rd generation; Future    Screening for deficiency anemia    Orders:    CBC and differential; Future    Screening for hyperlipidemia    Orders:    LDL cholesterol, direct; Future    Vitamin D deficiency    Orders:    Vitamin D 25 hydroxy; Future    Benign prostatic hyperplasia with nocturia    Orders:    tamsulosin (FLOMAX) 0.4 mg; Take 1 capsule (0.4 mg total) by mouth daily with dinner    Great toe pain, right    Orders:    XR toe right great min 2 view; Future    Ambulatory Referral to Podiatry; Future    Uric acid; Future      A/P: Doing ok and will check labs. Toe w/o any findings other then the pain. No s/s of gout. Will check an xray and refer to DPM. No more sz and to see neuro next few weeks. . Continue current treatment and RTC six months for routine.      History of  "Present Illness   WM RTC for f/u HTN, MARINA, etc. Doing ok and no new issues except for right toe pain for several weeks. No trauma. No h/o gout. Denies CP, SOB, edema, palpitations, orthopnea, or PND. Renal ca is in remission. EMBER is controlled. Had one sz like activity recently and has f/u with neuro.  AMD is unchanged.  Due for labs.       Review of Systems   Constitutional:  Negative for activity change, chills, diaphoresis, fatigue and fever.   HENT: Negative.     Eyes:  Positive for visual disturbance.   Respiratory:  Negative for cough, chest tightness, shortness of breath and wheezing.    Cardiovascular:  Negative for chest pain, palpitations and leg swelling.   Gastrointestinal:  Negative for abdominal pain, constipation, diarrhea, nausea and vomiting.   Endocrine: Negative for cold intolerance and heat intolerance.   Genitourinary:  Negative for difficulty urinating, dysuria and frequency.   Musculoskeletal:  Positive for arthralgias. Negative for gait problem, joint swelling and myalgias.   Neurological:  Positive for seizures. Negative for dizziness, syncope, weakness, light-headedness and headaches.   Psychiatric/Behavioral:  Negative for confusion, dysphoric mood and sleep disturbance. The patient is not nervous/anxious.        Objective   /80 (BP Location: Left arm, Patient Position: Sitting)   Pulse 69   Temp (!) 97.1 °F (36.2 °C) (Tympanic)   Ht 5' 8\" (1.727 m)   Wt 79.8 kg (176 lb)   SpO2 98%   BMI 26.76 kg/m²      Physical Exam  Vitals and nursing note reviewed.   Constitutional:       General: He is not in acute distress.     Appearance: Normal appearance. He is not ill-appearing.   HENT:      Head: Normocephalic and atraumatic.      Mouth/Throat:      Mouth: Mucous membranes are moist.     Eyes:      Extraocular Movements: Extraocular movements intact.      Conjunctiva/sclera: Conjunctivae normal.      Pupils: Pupils are equal, round, and reactive to light.     Neck:      Vascular: No " carotid bruit.     Cardiovascular:      Rate and Rhythm: Normal rate and regular rhythm.      Heart sounds: Normal heart sounds. No murmur heard.  Pulmonary:      Effort: Pulmonary effort is normal. No respiratory distress.      Breath sounds: Normal breath sounds. No wheezing, rhonchi or rales.   Abdominal:      General: There is no distension.      Palpations: Abdomen is soft.      Tenderness: There is no abdominal tenderness.     Musculoskeletal:         General: Tenderness present. No swelling or deformity. Normal range of motion.      Cervical back: Neck supple.      Right lower leg: No edema.      Left lower leg: No edema.      Comments: Right great toe joint w/o any gross deformities, increase temp, erythema, or swelling. No crepitus. No effusions or ballotment. Joint integrity intact.  ROM wnl. Tenderness diffusely over the MTP area. .       Neurological:      General: No focal deficit present.      Mental Status: He is alert and oriented to person, place, and time. Mental status is at baseline.     Psychiatric:         Mood and Affect: Mood normal.         Behavior: Behavior normal.         Thought Content: Thought content normal.         Judgment: Judgment normal.

## 2025-06-19 NOTE — PATIENT INSTRUCTIONS
"Patient Education     High blood pressure in adults   The Basics   Written by the doctors and editors at [facility]   What is high blood pressure? -- High blood pressure is a condition that puts you at risk for heart attack, stroke, and kidney disease. It does not usually cause symptoms. But it can be serious.  When your doctor or nurse tells you your blood pressure, they say 2 numbers. For instance, your doctor or nurse might say that your blood pressure is \"130 over 80.\" The top number is the pressure inside your arteries when your heart is tracy. The bottom number is the pressure inside your arteries when your heart is relaxed.  \"Elevated blood pressure\" is a term doctors or nurses use as a warning. People with elevated blood pressure do not yet have high blood pressure. But their blood pressure is not as low as it should be for good health.  Many experts define high, elevated, and normal blood pressure as follows:   High - Top number of 130 or above and/or bottom number of 80 or above.   Elevated - Top number between 120 and 129 and bottom number of 79 or below.   Normal - Top number of 119 or below and bottom number of 79 or below.  This information is also in the table (table 1).  How can I lower my blood pressure? -- If your doctor or nurse prescribed blood pressure medicine, the most important thing you can do is to take it. If it causes side effects, do not just stop taking it. Instead, talk to your doctor or nurse about the problems it causes. They might be able to lower your dose or switch you to another medicine. If cost is a problem, mention that, too. They might be able to put you on a less expensive medicine. Taking your blood pressure medicine can keep you from having a heart attack or stroke, and it can save your life!  Can I do anything on my own? -- You have a lot of control over your blood pressure. To lower it:   Lose weight (if you are overweight).   Choose a diet low in fat and rich in " "fruits, vegetables, and low-fat dairy products.   Eat less salt.   Do something active for at least 30 minutes a day on most days of the week.   Drink less alcohol (if you drink more than 2 alcoholic drinks per day).  It's also a good idea to get a home blood pressure meter. People who check their own blood pressure at home do better at keeping it low and can sometimes even reduce the amount of medicine they take.  All topics are updated as new evidence becomes available and our peer review process is complete.  This topic retrieved from [employer] on: Feb 26, 2024.  Topic 35465 Version 23.0  Release: 32.2.4 - C32.56  © 2024 UpToDate, Inc. and/or its affiliates. All rights reserved.  table 1: Definition of normal and high blood pressure  Level  Top number  Bottom number    High 130 or above 80 or above   Elevated 120 to 129 79 or below   Normal 119 or below 79 or below   These definitions are from the American College of Cardiology/American Heart Association. Other expert groups might use slightly different definitions.  \"Elevated blood pressure\" is a term doctor or nurses use as a warning. It means you do not yet have high blood pressure, but your blood pressure is not as low as it should be for good health.  Graphic 45292 Version 6.0  Consumer Information Use and Disclaimer   Disclaimer: This generalized information is a limited summary of diagnosis, treatment, and/or medication information. It is not meant to be comprehensive and should be used as a tool to help the user understand and/or assess potential diagnostic and treatment options. It does NOT include all information about conditions, treatments, medications, side effects, or risks that may apply to a specific patient. It is not intended to be medical advice or a substitute for the medical advice, diagnosis, or treatment of a health care provider based on the health care provider's examination and assessment of a patient's specific and unique circumstances. " Patients must speak with a health care provider for complete information about their health, medical questions, and treatment options, including any risks or benefits regarding use of medications. This information does not endorse any treatments or medications as safe, effective, or approved for treating a specific patient. UpToDate, Inc. and its affiliates disclaim any warranty or liability relating to this information or the use thereof.The use of this information is governed by the Terms of Use, available at https://www.[employer]uwer.com/en/know/clinical-effectiveness-terms. 2024© UpToDate, Inc. and its affiliates and/or licensors. All rights reserved.  Copyright   © 2024 UpToDate, Inc. and/or its affiliates. All rights reserved.

## 2025-06-19 NOTE — ASSESSMENT & PLAN NOTE
{For anemia, please use [facility] Hematology Work-Up SmartSet to perform initial work-up. If there are still concerns regarding management, an E-Consult to Hematology should be ordered. Click here to go the therapy plan activity if you need to order iron infusions. Search for 'PCP Venofer' therapy plan.  :[id number]}      Orders:    CBC and differential; Future

## 2025-06-20 ENCOUNTER — RESULTS FOLLOW-UP (OUTPATIENT)
Dept: INTERNAL MEDICINE CLINIC | Facility: CLINIC | Age: 70
End: 2025-06-20

## 2025-06-20 NOTE — TELEPHONE ENCOUNTER
Quin, wife of pt & on senior living form, called in inquiring if pt's XRAY results came in yet?    Relayed the following results note to wife:    Jonny Rahman, DO  6/20/2025  5:44 AM EDT       Call pt, xray with moderate arthritic changes only      Quin understood & states she will discuss this over with pt to see if he would like to follow-up with Podiatry.

## 2025-06-30 ENCOUNTER — APPOINTMENT (OUTPATIENT)
Dept: LAB | Facility: CLINIC | Age: 70
End: 2025-06-30
Attending: INTERNAL MEDICINE
Payer: MEDICARE

## 2025-06-30 DIAGNOSIS — I10 BENIGN ESSENTIAL HYPERTENSION: ICD-10-CM

## 2025-06-30 DIAGNOSIS — M79.674 GREAT TOE PAIN, RIGHT: ICD-10-CM

## 2025-06-30 DIAGNOSIS — Z13.29 SCREENING FOR THYROID DISORDER: ICD-10-CM

## 2025-06-30 DIAGNOSIS — E55.9 VITAMIN D DEFICIENCY: ICD-10-CM

## 2025-06-30 DIAGNOSIS — F41.1 GENERALIZED ANXIETY DISORDER: ICD-10-CM

## 2025-06-30 DIAGNOSIS — E78.2 MIXED HYPERLIPIDEMIA: ICD-10-CM

## 2025-06-30 DIAGNOSIS — E03.9 ACQUIRED HYPOTHYROIDISM: ICD-10-CM

## 2025-06-30 DIAGNOSIS — Z13.1 SCREENING FOR DIABETES MELLITUS: ICD-10-CM

## 2025-06-30 DIAGNOSIS — D50.8 IRON DEFICIENCY ANEMIA SECONDARY TO INADEQUATE DIETARY IRON INTAKE: ICD-10-CM

## 2025-06-30 DIAGNOSIS — Z13.0 SCREENING FOR DEFICIENCY ANEMIA: ICD-10-CM

## 2025-06-30 DIAGNOSIS — Z13.220 SCREENING FOR HYPERLIPIDEMIA: ICD-10-CM

## 2025-06-30 DIAGNOSIS — M15.9 GENERALIZED OSTEOARTHRITIS: ICD-10-CM

## 2025-06-30 LAB
25(OH)D3 SERPL-MCNC: 31.3 NG/ML (ref 30–100)
ALBUMIN SERPL BCG-MCNC: 4.6 G/DL (ref 3.5–5)
ALP SERPL-CCNC: 82 U/L (ref 34–104)
ALT SERPL W P-5'-P-CCNC: 11 U/L (ref 7–52)
ANION GAP SERPL CALCULATED.3IONS-SCNC: 7 MMOL/L (ref 4–13)
AST SERPL W P-5'-P-CCNC: 16 U/L (ref 13–39)
BASOPHILS # BLD AUTO: 0.02 THOUSANDS/ÂΜL (ref 0–0.1)
BASOPHILS NFR BLD AUTO: 0 % (ref 0–1)
BILIRUB SERPL-MCNC: 0.79 MG/DL (ref 0.2–1)
BUN SERPL-MCNC: 25 MG/DL (ref 5–25)
CALCIUM SERPL-MCNC: 9.8 MG/DL (ref 8.4–10.2)
CHLORIDE SERPL-SCNC: 100 MMOL/L (ref 96–108)
CO2 SERPL-SCNC: 31 MMOL/L (ref 21–32)
CREAT SERPL-MCNC: 1.07 MG/DL (ref 0.6–1.3)
CREAT UR-MCNC: 71.3 MG/DL
EOSINOPHIL # BLD AUTO: 0.14 THOUSAND/ÂΜL (ref 0–0.61)
EOSINOPHIL NFR BLD AUTO: 3 % (ref 0–6)
ERYTHROCYTE [DISTWIDTH] IN BLOOD BY AUTOMATED COUNT: 12.3 % (ref 11.6–15.1)
EST. AVERAGE GLUCOSE BLD GHB EST-MCNC: 108 MG/DL
GFR SERPL CREATININE-BSD FRML MDRD: 70 ML/MIN/1.73SQ M
GLUCOSE P FAST SERPL-MCNC: 87 MG/DL (ref 65–99)
HBA1C MFR BLD: 5.4 %
HCT VFR BLD AUTO: 40.4 % (ref 36.5–49.3)
HGB BLD-MCNC: 13.4 G/DL (ref 12–17)
IMM GRANULOCYTES # BLD AUTO: 0.01 THOUSAND/UL (ref 0–0.2)
IMM GRANULOCYTES NFR BLD AUTO: 0 % (ref 0–2)
LDLC SERPL DIRECT ASSAY-MCNC: 101 MG/DL (ref 0–100)
LYMPHOCYTES # BLD AUTO: 1.22 THOUSANDS/ÂΜL (ref 0.6–4.47)
LYMPHOCYTES NFR BLD AUTO: 22 % (ref 14–44)
MCH RBC QN AUTO: 30.4 PG (ref 26.8–34.3)
MCHC RBC AUTO-ENTMCNC: 33.2 G/DL (ref 31.4–37.4)
MCV RBC AUTO: 92 FL (ref 82–98)
MICROALBUMIN UR-MCNC: <7 MG/L
MONOCYTES # BLD AUTO: 0.6 THOUSAND/ÂΜL (ref 0.17–1.22)
MONOCYTES NFR BLD AUTO: 11 % (ref 4–12)
NEUTROPHILS # BLD AUTO: 3.66 THOUSANDS/ÂΜL (ref 1.85–7.62)
NEUTS SEG NFR BLD AUTO: 64 % (ref 43–75)
NRBC BLD AUTO-RTO: 0 /100 WBCS
PLATELET # BLD AUTO: 170 THOUSANDS/UL (ref 149–390)
PMV BLD AUTO: 10.7 FL (ref 8.9–12.7)
POTASSIUM SERPL-SCNC: 5.1 MMOL/L (ref 3.5–5.3)
PROT SERPL-MCNC: 7.2 G/DL (ref 6.4–8.4)
RBC # BLD AUTO: 4.41 MILLION/UL (ref 3.88–5.62)
SODIUM SERPL-SCNC: 138 MMOL/L (ref 135–147)
TRIGL SERPL-MCNC: 101 MG/DL (ref ?–150)
TSH SERPL DL<=0.05 MIU/L-ACNC: 2.29 UIU/ML (ref 0.45–4.5)
URATE SERPL-MCNC: 5.5 MG/DL (ref 3.5–8.5)
WBC # BLD AUTO: 5.65 THOUSAND/UL (ref 4.31–10.16)

## 2025-06-30 PROCEDURE — 83036 HEMOGLOBIN GLYCOSYLATED A1C: CPT

## 2025-06-30 PROCEDURE — 36415 COLL VENOUS BLD VENIPUNCTURE: CPT

## 2025-06-30 PROCEDURE — 82570 ASSAY OF URINE CREATININE: CPT

## 2025-06-30 PROCEDURE — 85025 COMPLETE CBC W/AUTO DIFF WBC: CPT

## 2025-06-30 PROCEDURE — 80053 COMPREHEN METABOLIC PANEL: CPT

## 2025-06-30 PROCEDURE — 84443 ASSAY THYROID STIM HORMONE: CPT

## 2025-06-30 PROCEDURE — 82043 UR ALBUMIN QUANTITATIVE: CPT

## 2025-06-30 PROCEDURE — 84550 ASSAY OF BLOOD/URIC ACID: CPT

## 2025-06-30 PROCEDURE — 84478 ASSAY OF TRIGLYCERIDES: CPT

## 2025-06-30 PROCEDURE — 82306 VITAMIN D 25 HYDROXY: CPT

## 2025-06-30 PROCEDURE — 83721 ASSAY OF BLOOD LIPOPROTEIN: CPT

## 2025-07-01 NOTE — TELEPHONE ENCOUNTER
----- Message from Jonny Rahman DO sent at 7/1/2025  5:48 AM EDT -----  Call pt labs are acceptable.   ----- Message -----  From: Lab, Background User  Sent: 6/30/2025   7:56 PM EDT  To: Jonny Rahman DO

## 2025-07-30 DIAGNOSIS — F41.9 ANXIETY: ICD-10-CM

## 2025-08-19 ENCOUNTER — TELEPHONE (OUTPATIENT)
Age: 70
End: 2025-08-19

## (undated) DEVICE — TUBING SUCTION 5MM X 12 FT

## (undated) DEVICE — 2000CC GUARDIAN II: Brand: GUARDIAN

## (undated) DEVICE — LUBRICANT SURGILUBE TUBE 4 OZ  FLIP TOP

## (undated) DEVICE — FORCEPS BIOPSY ALLIGATOR JAW W/NEEDLE 2.8MM